# Patient Record
Sex: MALE | Race: WHITE | NOT HISPANIC OR LATINO
[De-identification: names, ages, dates, MRNs, and addresses within clinical notes are randomized per-mention and may not be internally consistent; named-entity substitution may affect disease eponyms.]

---

## 2024-01-16 PROBLEM — Z00.00 ENCOUNTER FOR PREVENTIVE HEALTH EXAMINATION: Status: ACTIVE | Noted: 2024-01-16

## 2024-01-17 ENCOUNTER — APPOINTMENT (OUTPATIENT)
Dept: COLORECTAL SURGERY | Facility: CLINIC | Age: 59
End: 2024-01-17
Payer: MEDICAID

## 2024-01-17 VITALS
SYSTOLIC BLOOD PRESSURE: 140 MMHG | BODY MASS INDEX: 23.07 KG/M2 | TEMPERATURE: 98.9 F | RESPIRATION RATE: 12 BRPM | HEIGHT: 67 IN | HEART RATE: 76 BPM | DIASTOLIC BLOOD PRESSURE: 80 MMHG | WEIGHT: 147 LBS

## 2024-01-17 VITALS — BODY MASS INDEX: 23.39 KG/M2 | HEIGHT: 67 IN | WEIGHT: 149 LBS

## 2024-01-17 DIAGNOSIS — Z78.9 OTHER SPECIFIED HEALTH STATUS: ICD-10-CM

## 2024-01-17 DIAGNOSIS — Z83.3 FAMILY HISTORY OF DIABETES MELLITUS: ICD-10-CM

## 2024-01-17 DIAGNOSIS — Z80.3 FAMILY HISTORY OF MALIGNANT NEOPLASM OF BREAST: ICD-10-CM

## 2024-01-17 DIAGNOSIS — Z82.49 FAMILY HISTORY OF ISCHEMIC HEART DISEASE AND OTHER DISEASES OF THE CIRCULATORY SYSTEM: ICD-10-CM

## 2024-01-17 PROCEDURE — 45330 DIAGNOSTIC SIGMOIDOSCOPY: CPT

## 2024-01-17 PROCEDURE — 99204 OFFICE O/P NEW MOD 45 MIN: CPT | Mod: 25

## 2024-01-21 NOTE — ASSESSMENT
[FreeTextEntry1] : Pleasant 58-year-old gentleman presents for consultation regarding management of newly diagnosed distal sigmoid mass.  Patient was experiencing some change in bowel habits and for this reason saw gastroenterology for his initial colonoscopy.  The colonoscopy was done a few days ago revealing a malignant appearing lesion of the distal sigmoid the bottom edge of which was 15 cm from the anal verge.  The colonoscope could not traverse the lesion.  Biopsies are pending.  No CAT scan has been performed.  The patient has no significant past medical history.  His only surgical history includes an inguinal hernia repair with mesh.  Physical examination reveals a very healthy-appearing male.  His abdomen is soft, nontender, nondistended.  No mass is palpable.  There is no inguinal adenopathy.  Inspection of the anorectal area is unremarkable.  On digital exam no mass is palpable.  Flexible sigmoidoscopy confirmed the presence of a malignant appearing mass in the distal sigmoid.  Rigid sigmoidoscopy confirms that the bottom edge of the lesion is 15 cm from the anal verge.  The scope cannot traverse the mass.  The patient requires a CAT scan of the chest, abdomen and pelvis as part of an extent of disease workup.  As long as he does not have significant distal metastatic disease, he will require a laparoscopic assisted anterior resection.  We discussed the operative approach, anticipated operative time, hospital stay and time to complete recuperation.  Risks, alternatives and benefits including but not limited to anastomotic leak, wound infection and deep venous thrombosis were discussed.  Final pathologic staging and the possible need for adjuvant chemotherapy was also discussed.  Questions were answered and they wish to proceed with surgical scheduling.  He requires a CAT scan of the chest,, abdomen and pelvis as part of an extent of disease work-up.

## 2024-01-21 NOTE — PHYSICAL EXAM
[Normal Breath Sounds] : Normal breath sounds [Normal Heart Sounds] : normal heart sounds [Normal Rate and Rhythm] : normal rate and rhythm [No Edema] : No edema [Alert] : alert [Oriented to Person] : oriented to person [Oriented to Place] : oriented to place [Oriented to Time] : oriented to time [Calm] : calm [de-identified] : flat +BS NT/ND [de-identified] : NC/AT [de-identified] : +ROM [de-identified] : intact

## 2024-01-21 NOTE — HISTORY OF PRESENT ILLNESS
[FreeTextEntry1] : 59yo WM with onset of a change in bowel habits (ribbon like), rectal bleeding @one month ago. Seen by GI. Jan 9, 2024 underwent initial screening colonoscopy. This revealed a near obstructing rectosigmoid mass, which was biopsied. The scope was unable to traverse the lesion. Pathology revealed adenoCA. Denies CT. Presents for consultation.  Passing stool daily. Denies abdominal pain. Wt stable. Maintaining regular diet.

## 2024-01-25 ENCOUNTER — TRANSCRIPTION ENCOUNTER (OUTPATIENT)
Age: 59
End: 2024-01-25

## 2024-01-25 ENCOUNTER — OUTPATIENT (OUTPATIENT)
Dept: OUTPATIENT SERVICES | Facility: HOSPITAL | Age: 59
LOS: 1 days | End: 2024-01-25
Payer: COMMERCIAL

## 2024-01-25 VITALS
HEIGHT: 67 IN | RESPIRATION RATE: 18 BRPM | HEART RATE: 80 BPM | WEIGHT: 143.08 LBS | SYSTOLIC BLOOD PRESSURE: 158 MMHG | TEMPERATURE: 99 F | OXYGEN SATURATION: 98 % | DIASTOLIC BLOOD PRESSURE: 89 MMHG

## 2024-01-25 DIAGNOSIS — Z98.890 OTHER SPECIFIED POSTPROCEDURAL STATES: Chronic | ICD-10-CM

## 2024-01-25 DIAGNOSIS — Z01.818 ENCOUNTER FOR OTHER PREPROCEDURAL EXAMINATION: ICD-10-CM

## 2024-01-25 DIAGNOSIS — K63.89 OTHER SPECIFIED DISEASES OF INTESTINE: ICD-10-CM

## 2024-01-25 DIAGNOSIS — Z29.9 ENCOUNTER FOR PROPHYLACTIC MEASURES, UNSPECIFIED: ICD-10-CM

## 2024-01-25 LAB
ANION GAP SERPL CALC-SCNC: 11 MMOL/L — SIGNIFICANT CHANGE UP (ref 5–17)
BLD GP AB SCN SERPL QL: NEGATIVE — SIGNIFICANT CHANGE UP
BUN SERPL-MCNC: 22 MG/DL — SIGNIFICANT CHANGE UP (ref 7–23)
CALCIUM SERPL-MCNC: 9.8 MG/DL — SIGNIFICANT CHANGE UP (ref 8.4–10.5)
CHLORIDE SERPL-SCNC: 101 MMOL/L — SIGNIFICANT CHANGE UP (ref 96–108)
CO2 SERPL-SCNC: 27 MMOL/L — SIGNIFICANT CHANGE UP (ref 22–31)
CREAT SERPL-MCNC: 1.42 MG/DL — HIGH (ref 0.5–1.3)
EGFR: 57 ML/MIN/1.73M2 — LOW
GLUCOSE SERPL-MCNC: 98 MG/DL — SIGNIFICANT CHANGE UP (ref 70–99)
HCT VFR BLD CALC: 44.7 % — SIGNIFICANT CHANGE UP (ref 39–50)
HGB BLD-MCNC: 14.3 G/DL — SIGNIFICANT CHANGE UP (ref 13–17)
MCHC RBC-ENTMCNC: 28.3 PG — SIGNIFICANT CHANGE UP (ref 27–34)
MCHC RBC-ENTMCNC: 32 GM/DL — SIGNIFICANT CHANGE UP (ref 32–36)
MCV RBC AUTO: 88.3 FL — SIGNIFICANT CHANGE UP (ref 80–100)
NRBC # BLD: 0 /100 WBCS — SIGNIFICANT CHANGE UP (ref 0–0)
PLATELET # BLD AUTO: 511 K/UL — HIGH (ref 150–400)
POTASSIUM SERPL-MCNC: 4.5 MMOL/L — SIGNIFICANT CHANGE UP (ref 3.5–5.3)
POTASSIUM SERPL-SCNC: 4.5 MMOL/L — SIGNIFICANT CHANGE UP (ref 3.5–5.3)
RBC # BLD: 5.06 M/UL — SIGNIFICANT CHANGE UP (ref 4.2–5.8)
RBC # FLD: 12.7 % — SIGNIFICANT CHANGE UP (ref 10.3–14.5)
RH IG SCN BLD-IMP: POSITIVE — SIGNIFICANT CHANGE UP
SODIUM SERPL-SCNC: 139 MMOL/L — SIGNIFICANT CHANGE UP (ref 135–145)
WBC # BLD: 8.59 K/UL — SIGNIFICANT CHANGE UP (ref 3.8–10.5)
WBC # FLD AUTO: 8.59 K/UL — SIGNIFICANT CHANGE UP (ref 3.8–10.5)

## 2024-01-25 PROCEDURE — 80048 BASIC METABOLIC PNL TOTAL CA: CPT

## 2024-01-25 PROCEDURE — 86901 BLOOD TYPING SEROLOGIC RH(D): CPT

## 2024-01-25 PROCEDURE — 82378 CARCINOEMBRYONIC ANTIGEN: CPT

## 2024-01-25 PROCEDURE — 87086 URINE CULTURE/COLONY COUNT: CPT

## 2024-01-25 PROCEDURE — G0463: CPT

## 2024-01-25 PROCEDURE — 85027 COMPLETE CBC AUTOMATED: CPT

## 2024-01-25 PROCEDURE — 86900 BLOOD TYPING SEROLOGIC ABO: CPT

## 2024-01-25 PROCEDURE — 86850 RBC ANTIBODY SCREEN: CPT

## 2024-01-25 PROCEDURE — 83036 HEMOGLOBIN GLYCOSYLATED A1C: CPT

## 2024-01-25 NOTE — H&P PST ADULT - HISTORY OF PRESENT ILLNESS
57 yo male presents to [facility] prior to scheduled Laparoscopic lower anterior resection on 1/30/24 with Dr. Aquilino Alegre. Pt c/o recent changes in bowel habits (ribbon like stools); had colonoscopy which revealed large mass.     Patient was recently evaluated by Dr. Alegre and above surgery was recommended.     57 yo male presents to [facility] prior to scheduled Laparoscopic lower anterior resection on 1/30/24 with Dr. Aquilino Alegre. Pt c/o recent changes in bowel habits (ribbon like stools); had colonoscopy which revealed large mass, path revealed adenocarcinoma. Denies past medical hx, not on any medications.    Patient was recently evaluated by Dr. Alegre and above surgery was recommended.

## 2024-01-25 NOTE — H&P PST ADULT - ASSESSMENT
DASI score: 8  DASI activity: runs 2 miles / day 4x a week - works out at the gym 4x a day - weight training   Loose teeth or denture: denies    CAPRINI SCORE [CLOT]    AGE RELATED RISK FACTORS                                                       MOBILITY RELATED FACTORS  [ x] Age 41-60 years                                            (1 Point)                  [ ] Bed rest                                                        (1 Point)  [ ] Age: 61-74 years                                           (2 Points)                 [ ] Plaster cast                                                   (2 Points)  [ ] Age= 75 years                                              (3 Points)                 [ ] Bed bound for more than 72 hours                 (2 Points)    DISEASE RELATED RISK FACTORS                                               GENDER SPECIFIC FACTORS  [ ] Edema in the lower extremities                       (1 Point)                  [ ] Pregnancy                                                     (1 Point)  [ ] Varicose veins                                               (1 Point)                  [ ] Post-partum < 6 weeks                                   (1 Point)             [x ] BMI > 25 Kg/m2                                            (1 Point)                  [ ] Hormonal therapy  or oral contraception          (1 Point)                 [ ] Sepsis (in the previous month)                        (1 Point)                  [ ] History of pregnancy complications                 (1 point)  [ ] Pneumonia or serious lung disease                                               [ ] Unexplained or recurrent                     (1 Point)           (in the previous month)                               (1 Point)  [ ] Abnormal pulmonary function test                     (1 Point)                 SURGERY RELATED RISK FACTORS  [ ] Acute myocardial infarction                              (1 Point)                 [ ]  Section                                             (1 Point)  [ ] Congestive heart failure (in the previous month)  (1 Point)               [ ] Minor surgery                                                  (1 Point)   [ ] Inflammatory bowel disease                             (1 Point)                 [ ] Arthroscopic surgery                                        (2 Points)  [ ] Central venous access                                      (2 Points)                [x ] General surgery lasting more than 45 minutes   (2 Points)       [ ] Stroke (in the previous month)                          (5 Points)               [ ] Elective arthroplasty                                         (5 Points)                                                                                                                                               HEMATOLOGY RELATED FACTORS                                                 TRAUMA RELATED RISK FACTORS  [ ] Prior episodes of VTE                                     (3 Points)                [ ] Fracture of the hip, pelvis, or leg                       (5 Points)  [ ] Positive family history for VTE                         (3 Points)                 [ ] Acute spinal cord injury (in the previous month)  (5 Points)  [ ] Prothrombin 05853 A                                     (3 Points)                 [ ] Paralysis  (less than 1 month)                             (5 Points)  [ ] Factor V Leiden                                             (3 Points)                  [ ] Multiple Trauma within 1 month                        (5 Points)  [ ] Lupus anticoagulants                                     (3 Points)                                                           [ ] Anticardiolipin antibodies                               (3 Points)                                                       [ ] High homocysteine in the blood                      (3 Points)                                             [ ] Other congenital or acquired thrombophilia      (3 Points)                                                [ ] Heparin induced thrombocytopenia                  (3 Points)                                          Total Score [        4  ]    Caprini Score 0 - 2:  Low Risk, No VTE Prophylaxis required for most patients, encourage ambulation  Caprini Score 3 - 6:  At Risk, pharmacologic VTE prophylaxis is indicated for most patients (in the absence of a contraindication)  Caprini Score Greater than or = 7:  High Risk, pharmacologic VTE prophylaxis is indicated for most patients (in the absence of a contraindication) DASI score: 8  DASI activity: runs 2 miles / day 4x a week - works out at the gym 4x a day - weight training   Loose teeth or denture: denies    CAPRINI SCORE [CLOT]    AGE RELATED RISK FACTORS                                                       MOBILITY RELATED FACTORS  [ x] Age 41-60 years                                            (1 Point)                  [ ] Bed rest                                                        (1 Point)  [ ] Age: 61-74 years                                           (2 Points)                 [ ] Plaster cast                                                   (2 Points)  [ ] Age= 75 years                                              (3 Points)                 [ ] Bed bound for more than 72 hours                 (2 Points)    DISEASE RELATED RISK FACTORS                                               GENDER SPECIFIC FACTORS  [ ] Edema in the lower extremities                       (1 Point)                  [ ] Pregnancy                                                     (1 Point)  [ ] Varicose veins                                               (1 Point)                  [ ] Post-partum < 6 weeks                                   (1 Point)             [ ] BMI > 25 Kg/m2                                            (1 Point)                  [ ] Hormonal therapy  or oral contraception          (1 Point)                 [ ] Sepsis (in the previous month)                        (1 Point)                  [ ] History of pregnancy complications                 (1 point)  [ ] Pneumonia or serious lung disease                                               [ ] Unexplained or recurrent                     (1 Point)           (in the previous month)                               (1 Point)  [ ] Abnormal pulmonary function test                     (1 Point)                 SURGERY RELATED RISK FACTORS  [ ] Acute myocardial infarction                              (1 Point)                 [ ]  Section                                             (1 Point)  [ ] Congestive heart failure (in the previous month)  (1 Point)               [ ] Minor surgery                                                  (1 Point)   [ ] Inflammatory bowel disease                             (1 Point)                 [ ] Arthroscopic surgery                                        (2 Points)  [ ] Central venous access                                      (2 Points)                [x ] General surgery lasting more than 45 minutes   (2 Points)       [ ] Stroke (in the previous month)                          (5 Points)               [ ] Elective arthroplasty                                         (5 Points)                                                                                                                                               HEMATOLOGY RELATED FACTORS                                                 TRAUMA RELATED RISK FACTORS  [ ] Prior episodes of VTE                                     (3 Points)                [ ] Fracture of the hip, pelvis, or leg                       (5 Points)  [ ] Positive family history for VTE                         (3 Points)                 [ ] Acute spinal cord injury (in the previous month)  (5 Points)  [ ] Prothrombin 69348 A                                     (3 Points)                 [ ] Paralysis  (less than 1 month)                             (5 Points)  [ ] Factor V Leiden                                             (3 Points)                  [ ] Multiple Trauma within 1 month                        (5 Points)  [ ] Lupus anticoagulants                                     (3 Points)                                                           [ ] Anticardiolipin antibodies                               (3 Points)                                                       [ ] High homocysteine in the blood                      (3 Points)                                             [ ] Other congenital or acquired thrombophilia      (3 Points)                                                [ ] Heparin induced thrombocytopenia                  (3 Points)                                          Total Score [       3  ]    Caprini Score 0 - 2:  Low Risk, No VTE Prophylaxis required for most patients, encourage ambulation  Caprini Score 3 - 6:  At Risk, pharmacologic VTE prophylaxis is indicated for most patients (in the absence of a contraindication)  Caprini Score Greater than or = 7:  High Risk, pharmacologic VTE prophylaxis is indicated for most patients (in the absence of a contraindication)

## 2024-01-25 NOTE — H&P PST ADULT - PROBLEM SELECTOR PLAN 1
Laparoscopic lower anterior resection on 1/30/24 with Dr. Aquilino Alegre.  Pre-op instructions given. Questions answered.

## 2024-01-25 NOTE — H&P PST ADULT - NSICDXFAMILYHX_GEN_ALL_CORE_FT
FAMILY HISTORY:  Father  Still living? Unknown  FH: HTN (hypertension), Age at diagnosis: Age Unknown  FH: type 2 diabetes, Age at diagnosis: Age Unknown    Mother  Still living? Unknown  FH: breast cancer, Age at diagnosis: Age Unknown    Sibling  Still living? Unknown  FHx: congenital heart disease, Age at diagnosis: Age Unknown

## 2024-01-25 NOTE — H&P PST ADULT - NSICDXPASTMEDICALHX_GEN_ALL_CORE_FT
PAST MEDICAL HISTORY:  Colon cancer      PAST MEDICAL HISTORY:  Colon cancer     H/O inguinal hernia

## 2024-01-26 ENCOUNTER — RESULT REVIEW (OUTPATIENT)
Age: 59
End: 2024-01-26

## 2024-01-26 LAB
A1C WITH ESTIMATED AVERAGE GLUCOSE RESULT: 5.5 % — SIGNIFICANT CHANGE UP (ref 4–5.6)
CEA SERPL-MCNC: 1.5 NG/ML — SIGNIFICANT CHANGE UP (ref 0–3.8)
CULTURE RESULTS: SIGNIFICANT CHANGE UP
ESTIMATED AVERAGE GLUCOSE: 111 MG/DL — SIGNIFICANT CHANGE UP (ref 68–114)
SPECIMEN SOURCE: SIGNIFICANT CHANGE UP

## 2024-01-29 ENCOUNTER — TRANSCRIPTION ENCOUNTER (OUTPATIENT)
Age: 59
End: 2024-01-29

## 2024-01-29 PROBLEM — C18.9 MALIGNANT NEOPLASM OF COLON, UNSPECIFIED: Chronic | Status: ACTIVE | Noted: 2024-01-25

## 2024-01-29 PROBLEM — Z87.19 PERSONAL HISTORY OF OTHER DISEASES OF THE DIGESTIVE SYSTEM: Chronic | Status: ACTIVE | Noted: 2024-01-25

## 2024-01-30 ENCOUNTER — APPOINTMENT (OUTPATIENT)
Dept: COLORECTAL SURGERY | Facility: HOSPITAL | Age: 59
End: 2024-01-30
Payer: MEDICAID

## 2024-01-30 ENCOUNTER — APPOINTMENT (OUTPATIENT)
Dept: UROLOGY | Facility: HOSPITAL | Age: 59
End: 2024-01-30

## 2024-01-30 ENCOUNTER — INPATIENT (INPATIENT)
Facility: HOSPITAL | Age: 59
LOS: 5 days | Discharge: HOME | DRG: 330 | End: 2024-02-05
Attending: SURGERY | Admitting: SURGERY
Payer: COMMERCIAL

## 2024-01-30 ENCOUNTER — RESULT REVIEW (OUTPATIENT)
Age: 59
End: 2024-01-30

## 2024-01-30 VITALS
OXYGEN SATURATION: 100 % | RESPIRATION RATE: 17 BRPM | WEIGHT: 143.08 LBS | DIASTOLIC BLOOD PRESSURE: 82 MMHG | TEMPERATURE: 98 F | HEIGHT: 67.01 IN | SYSTOLIC BLOOD PRESSURE: 176 MMHG | HEART RATE: 79 BPM

## 2024-01-30 DIAGNOSIS — Z98.890 OTHER SPECIFIED POSTPROCEDURAL STATES: Chronic | ICD-10-CM

## 2024-01-30 DIAGNOSIS — K63.89 OTHER SPECIFIED DISEASES OF INTESTINE: ICD-10-CM

## 2024-01-30 LAB
GLUCOSE BLDC GLUCOMTR-MCNC: 124 MG/DL — HIGH (ref 70–99)
RH IG SCN BLD-IMP: POSITIVE — SIGNIFICANT CHANGE UP

## 2024-01-30 PROCEDURE — 88342 IMHCHEM/IMCYTCHM 1ST ANTB: CPT | Mod: 26

## 2024-01-30 PROCEDURE — 52344 CYSTO/URETERO STRICTURE TX: CPT | Mod: RT,59

## 2024-01-30 PROCEDURE — 74420 UROGRAPHY RTRGR +-KUB: CPT | Mod: 26

## 2024-01-30 PROCEDURE — 88305 TISSUE EXAM BY PATHOLOGIST: CPT | Mod: 26

## 2024-01-30 PROCEDURE — 88341 IMHCHEM/IMCYTCHM EA ADD ANTB: CPT | Mod: 26

## 2024-01-30 PROCEDURE — 52240 CYSTOSCOPY AND TREATMENT: CPT

## 2024-01-30 PROCEDURE — 45300 PROCTOSIGMOIDOSCOPY DX: CPT

## 2024-01-30 PROCEDURE — 52332 CYSTOSCOPY AND TREATMENT: CPT | Mod: 50,59

## 2024-01-30 PROCEDURE — 44207 L COLECTOMY/COLOPROCTOSTOMY: CPT

## 2024-01-30 PROCEDURE — 88309 TISSUE EXAM BY PATHOLOGIST: CPT | Mod: 26

## 2024-01-30 DEVICE — VISTASEAL FIBRIN HUMAN 4ML: Type: IMPLANTABLE DEVICE | Status: FUNCTIONAL

## 2024-01-30 DEVICE — IMPLANTABLE DEVICE: Type: IMPLANTABLE DEVICE | Status: FUNCTIONAL

## 2024-01-30 DEVICE — KIT URET PERFLX PLUS 7FRX26CM: Type: IMPLANTABLE DEVICE | Status: FUNCTIONAL

## 2024-01-30 DEVICE — GUIDEWIRE AMPLATZ SUPER-STIFF STRAIGHT .035" X 145CM: Type: IMPLANTABLE DEVICE | Status: FUNCTIONAL

## 2024-01-30 DEVICE — GUIDEWIRE SENSOR DUAL-FLEX NITINOL STRAIGHT .035" X 150CM: Type: IMPLANTABLE DEVICE | Status: FUNCTIONAL

## 2024-01-30 DEVICE — URETERAL SHEATH NAVIGATOR HD 12/14FR X 36CM: Type: IMPLANTABLE DEVICE | Status: FUNCTIONAL

## 2024-01-30 DEVICE — URETERAL STENT INLAY OPTIMA 8FR 26CM: Type: IMPLANTABLE DEVICE | Status: FUNCTIONAL

## 2024-01-30 DEVICE — STAPLER COVIDIEN PURSTRING 65MM: Type: IMPLANTABLE DEVICE | Status: FUNCTIONAL

## 2024-01-30 DEVICE — BALLOON CATH UROMAX ULTRA 18FR X 4CM: Type: IMPLANTABLE DEVICE | Status: FUNCTIONAL

## 2024-01-30 DEVICE — STAPLER ETHICON CIRCULAR XL 29MM: Type: IMPLANTABLE DEVICE | Status: FUNCTIONAL

## 2024-01-30 DEVICE — URETERAL STENT CONTOUR 6FR 26CM: Type: IMPLANTABLE DEVICE | Status: FUNCTIONAL

## 2024-01-30 DEVICE — STAPLER CONTOUR CURVED WITH BLUE CART: Type: IMPLANTABLE DEVICE | Status: FUNCTIONAL

## 2024-01-30 DEVICE — URETERAL CATH OPEN END 5FR 70CM: Type: IMPLANTABLE DEVICE | Status: FUNCTIONAL

## 2024-01-30 DEVICE — DILATOR SHEATH SET 8/10: Type: IMPLANTABLE DEVICE | Status: FUNCTIONAL

## 2024-01-30 DEVICE — URETERAL CATH WHISTLE TIP 5FR LEFT: Type: IMPLANTABLE DEVICE | Status: FUNCTIONAL

## 2024-01-30 DEVICE — STENT URET 7FR 26CM: Type: IMPLANTABLE DEVICE | Status: FUNCTIONAL

## 2024-01-30 RX ORDER — DEXAMETHASONE 0.5 MG/5ML
4 ELIXIR ORAL EVERY 6 HOURS
Refills: 0 | Status: DISCONTINUED | OUTPATIENT
Start: 2024-01-30 | End: 2024-01-31

## 2024-01-30 RX ORDER — OXYCODONE HYDROCHLORIDE 5 MG/1
5 TABLET ORAL
Refills: 0 | Status: DISCONTINUED | OUTPATIENT
Start: 2024-01-30 | End: 2024-01-31

## 2024-01-30 RX ORDER — CHLORHEXIDINE GLUCONATE 213 G/1000ML
1 SOLUTION TOPICAL ONCE
Refills: 0 | Status: DISCONTINUED | OUTPATIENT
Start: 2024-01-30 | End: 2024-01-30

## 2024-01-30 RX ORDER — ONDANSETRON 8 MG/1
4 TABLET, FILM COATED ORAL EVERY 6 HOURS
Refills: 0 | Status: DISCONTINUED | OUTPATIENT
Start: 2024-01-30 | End: 2024-01-31

## 2024-01-30 RX ORDER — INFLUENZA VIRUS VACCINE 15; 15; 15; 15 UG/.5ML; UG/.5ML; UG/.5ML; UG/.5ML
0.5 SUSPENSION INTRAMUSCULAR ONCE
Refills: 0 | Status: DISCONTINUED | OUTPATIENT
Start: 2024-01-30 | End: 2024-02-05

## 2024-01-30 RX ORDER — SODIUM CHLORIDE 9 MG/ML
3 INJECTION INTRAMUSCULAR; INTRAVENOUS; SUBCUTANEOUS EVERY 8 HOURS
Refills: 0 | Status: DISCONTINUED | OUTPATIENT
Start: 2024-01-30 | End: 2024-01-30

## 2024-01-30 RX ORDER — MORPHINE SULFATE 50 MG/1
0.25 CAPSULE, EXTENDED RELEASE ORAL ONCE
Refills: 0 | Status: DISCONTINUED | OUTPATIENT
Start: 2024-01-30 | End: 2024-01-31

## 2024-01-30 RX ORDER — OXYCODONE HYDROCHLORIDE 5 MG/1
10 TABLET ORAL
Refills: 0 | Status: DISCONTINUED | OUTPATIENT
Start: 2024-01-30 | End: 2024-01-31

## 2024-01-30 RX ORDER — ACETAMINOPHEN 500 MG
1000 TABLET ORAL EVERY 6 HOURS
Refills: 0 | Status: COMPLETED | OUTPATIENT
Start: 2024-01-30 | End: 2024-01-31

## 2024-01-30 RX ORDER — SODIUM CHLORIDE 9 MG/ML
1000 INJECTION, SOLUTION INTRAVENOUS
Refills: 0 | Status: DISCONTINUED | OUTPATIENT
Start: 2024-01-30 | End: 2024-01-30

## 2024-01-30 RX ORDER — CELECOXIB 200 MG/1
400 CAPSULE ORAL ONCE
Refills: 0 | Status: COMPLETED | OUTPATIENT
Start: 2024-01-30 | End: 2024-01-30

## 2024-01-30 RX ORDER — OXYCODONE HYDROCHLORIDE 5 MG/1
2.5 TABLET ORAL EVERY 4 HOURS
Refills: 0 | Status: DISCONTINUED | OUTPATIENT
Start: 2024-01-30 | End: 2024-02-05

## 2024-01-30 RX ORDER — SODIUM CHLORIDE 9 MG/ML
1000 INJECTION, SOLUTION INTRAVENOUS
Refills: 0 | Status: DISCONTINUED | OUTPATIENT
Start: 2024-01-30 | End: 2024-01-31

## 2024-01-30 RX ORDER — OXYCODONE HYDROCHLORIDE 5 MG/1
5 TABLET ORAL EVERY 4 HOURS
Refills: 0 | Status: DISCONTINUED | OUTPATIENT
Start: 2024-01-30 | End: 2024-02-05

## 2024-01-30 RX ORDER — HEPARIN SODIUM 5000 [USP'U]/ML
5000 INJECTION INTRAVENOUS; SUBCUTANEOUS EVERY 8 HOURS
Refills: 0 | Status: DISCONTINUED | OUTPATIENT
Start: 2024-01-30 | End: 2024-02-05

## 2024-01-30 RX ORDER — LIDOCAINE HCL 20 MG/ML
0.2 VIAL (ML) INJECTION ONCE
Refills: 0 | Status: DISCONTINUED | OUTPATIENT
Start: 2024-01-30 | End: 2024-01-30

## 2024-01-30 RX ORDER — CEFOTETAN DISODIUM 1 G
2 VIAL (EA) INJECTION ONCE
Refills: 0 | Status: COMPLETED | OUTPATIENT
Start: 2024-01-30 | End: 2024-01-30

## 2024-01-30 RX ORDER — NALOXONE HYDROCHLORIDE 4 MG/.1ML
0.1 SPRAY NASAL
Refills: 0 | Status: DISCONTINUED | OUTPATIENT
Start: 2024-01-30 | End: 2024-01-31

## 2024-01-30 RX ADMIN — HEPARIN SODIUM 5000 UNIT(S): 5000 INJECTION INTRAVENOUS; SUBCUTANEOUS at 21:50

## 2024-01-30 RX ADMIN — SODIUM CHLORIDE 40 MILLILITER(S): 9 INJECTION, SOLUTION INTRAVENOUS at 23:01

## 2024-01-30 RX ADMIN — Medication 400 MILLIGRAM(S): at 23:01

## 2024-01-30 RX ADMIN — Medication 1000 MILLIGRAM(S): at 23:31

## 2024-01-30 NOTE — CHART NOTE - NSCHARTNOTEFT_GEN_A_CORE
General Surgery Post op Check    Pt seen and examined without complaints. Pain is well-controlled. Denies SOB/CP/N/V. Passing gas.     Vital Signs Last 24 Hrs  T(C): 37.2 (30 Jan 2024 23:00), Max: 37.2 (30 Jan 2024 23:00)  T(F): 99 (30 Jan 2024 23:00), Max: 99 (30 Jan 2024 23:00)  HR: 77 (30 Jan 2024 23:00) (76 - 88)  BP: 121/79 (30 Jan 2024 23:00) (121/79 - 176/82)  BP(mean): 88 (30 Jan 2024 21:30) (88 - 107)  RR: 18 (30 Jan 2024 23:00) (13 - 18)  SpO2: 100% (30 Jan 2024 23:00) (96% - 100%)    Parameters below as of 30 Jan 2024 23:00  Patient On (Oxygen Delivery Method): nasal cannula  O2 Flow (L/min): 2      I&O's Summary    30 Jan 2024 07:01  -  30 Jan 2024 23:39  --------------------------------------------------------  IN: 230 mL / OUT: 200 mL / NET: 30 mL        Physical Exam  Gen: NAD, A&Ox3  Pulm: No respiratory distress, no subcostal retractions  CV: RRR, no JVD  Abd: Soft, NT, ND, dressings c/d/i  Extremities:  FROM, warm and well perfused, equal bilateral muscle strength  : morales, dressing in place      A/P: 58y Male with rectosigmoid cancer s/p laparoscopic hand assisted anterior resection, transurethral section of bladder tumor by urology, doing well.   -Do not remove or manipulate morales - management per urology  -ERP  -No toradol  -CLD + IVF  -DVT ppx: [facility]    [facility]  [id number]

## 2024-01-30 NOTE — BRIEF OPERATIVE NOTE - NSICDXBRIEFPROCEDURE_GEN_ALL_CORE_FT
PROCEDURES:  Cystourethroscopy with bladder tumor resection, large 30-Jan-2024 17:48:24  Alfred Anderson

## 2024-01-30 NOTE — BRIEF OPERATIVE NOTE - NSICDXBRIEFPROCEDURE_GEN_ALL_CORE_FT
PROCEDURES:  Resection, colon, anterior 25-Jan-2024 17:58:08  Caroline Jackson  Cystourethroscopy with bladder tumor resection, large 30-Jan-2024 17:48:24  Alfred Anderson  Intraoperative rigid sigmoidoscopy 30-Jan-2024 19:40:39  Bernie Keen

## 2024-01-30 NOTE — BRIEF OPERATIVE NOTE - OPERATION/FINDINGS
Cystoscopy, TURBT, right ureteral stent placement, left ureteral catheter placement. 22Fr 2 way in place.
Tumor identified. Laparoscopic hand assisted anterior resection performed. Colorectal anastomosis created with EEA 29 stapler at 11cm. Negative air leak test. Anastomosis patent and hemostatic on rigid sigmoidoscopy. Bilateral ureters identified and protected.

## 2024-01-30 NOTE — PROGRESS NOTE ADULT - SUBJECTIVE AND OBJECTIVE BOX
Post op Check    Pt seen and examined without complaints. Pain is controlled. Denies SOB/CP/N/V.     Vital Signs Last 24 Hrs  T(C): 36.7 (30 Jan 2024 21:00), Max: 36.7 (30 Jan 2024 19:50)  T(F): 98 (30 Jan 2024 21:00), Max: 98 (30 Jan 2024 19:50)  HR: 79 (30 Jan 2024 21:30) (76 - 88)  BP: 124/73 (30 Jan 2024 21:30) (124/73 - 176/82)  BP(mean): 88 (30 Jan 2024 21:30) (88 - 107)  RR: 16 (30 Jan 2024 21:30) (13 - 17)  SpO2: 96% (30 Jan 2024 21:30) (96% - 100%)    Parameters below as of 30 Jan 2024 21:30  Patient On (Oxygen Delivery Method): nasal cannula  O2 Flow (L/min): 2      I&O's Summary    30 Jan 2024 07:01  -  30 Jan 2024 22:00  --------------------------------------------------------  IN: 230 mL / OUT: 200 mL / NET: 30 mL        Physical Exam  Gen: NAD  Pulm: No respiratory distress, no subcostal retractions  Abd: Soft  : ucath secured in adaptor. morales in place with betito colored urine draining.                 A/P: 58y Male s/p Cystoscopy, TURBT, right ureteral stent placement, left ureteral catheter placement prior to laparoscopic hand assisted anterior resection    maintain morales catheter x 2 weeks  keep L ucath until Thursday   to follow  remainder of care per primary team

## 2024-01-30 NOTE — PATIENT PROFILE ADULT - FALL HARM RISK - UNIVERSAL INTERVENTIONS
Bed in lowest position, wheels locked, appropriate side rails in place/Call bell, personal items and telephone in reach/Instruct patient to call for assistance before getting out of bed or chair/Non-slip footwear when patient is out of bed/[place] to call system/Physically safe environment - no spills, clutter or unnecessary equipment/Purposeful Proactive Rounding/Room/bathroom lighting operational, light cord in reach

## 2024-01-30 NOTE — CHART NOTE - NSCHARTNOTEFT_GEN_A_CORE
Patient is s/p TURBT, right ureteral stent placement, and left ureteral catheter placement with Dr. Malcolm  - Bladder tumor was large and over the right ureteral orifice  - Plan to keep 22Fr 2 way morales in for 2 weeks. 15cc is in the balloon  - Right ureteral stent in place with no string -- patient will f/u outpatient after discharge with Dr. Malcolm for stent removal  - Keep Left ureteral catheter for two days and can be removed on 2/1  - Urology will follow  - Discussed with Dr. Malcolm

## 2024-01-31 ENCOUNTER — TRANSCRIPTION ENCOUNTER (OUTPATIENT)
Age: 59
End: 2024-01-31

## 2024-01-31 LAB
ANION GAP SERPL CALC-SCNC: 8 MMOL/L — SIGNIFICANT CHANGE UP (ref 5–17)
BUN SERPL-MCNC: 12 MG/DL — SIGNIFICANT CHANGE UP (ref 7–23)
CALCIUM SERPL-MCNC: 8.4 MG/DL — SIGNIFICANT CHANGE UP (ref 8.4–10.5)
CHLORIDE SERPL-SCNC: 104 MMOL/L — SIGNIFICANT CHANGE UP (ref 96–108)
CO2 SERPL-SCNC: 27 MMOL/L — SIGNIFICANT CHANGE UP (ref 22–31)
CREAT SERPL-MCNC: 1.26 MG/DL — SIGNIFICANT CHANGE UP (ref 0.5–1.3)
EGFR: 66 ML/MIN/1.73M2 — SIGNIFICANT CHANGE UP
GLUCOSE SERPL-MCNC: 97 MG/DL — SIGNIFICANT CHANGE UP (ref 70–99)
HCT VFR BLD CALC: 38.3 % — LOW (ref 39–50)
HGB BLD-MCNC: 12 G/DL — LOW (ref 13–17)
MAGNESIUM SERPL-MCNC: 2.2 MG/DL — SIGNIFICANT CHANGE UP (ref 1.6–2.6)
MCHC RBC-ENTMCNC: 27.6 PG — SIGNIFICANT CHANGE UP (ref 27–34)
MCHC RBC-ENTMCNC: 31.3 GM/DL — LOW (ref 32–36)
MCV RBC AUTO: 88.2 FL — SIGNIFICANT CHANGE UP (ref 80–100)
NRBC # BLD: 0 /100 WBCS — SIGNIFICANT CHANGE UP (ref 0–0)
PHOSPHATE SERPL-MCNC: 4 MG/DL — SIGNIFICANT CHANGE UP (ref 2.5–4.5)
PLATELET # BLD AUTO: 365 K/UL — SIGNIFICANT CHANGE UP (ref 150–400)
POTASSIUM SERPL-MCNC: 5 MMOL/L — SIGNIFICANT CHANGE UP (ref 3.5–5.3)
POTASSIUM SERPL-SCNC: 5 MMOL/L — SIGNIFICANT CHANGE UP (ref 3.5–5.3)
RBC # BLD: 4.34 M/UL — SIGNIFICANT CHANGE UP (ref 4.2–5.8)
RBC # FLD: 12.8 % — SIGNIFICANT CHANGE UP (ref 10.3–14.5)
SODIUM SERPL-SCNC: 139 MMOL/L — SIGNIFICANT CHANGE UP (ref 135–145)
WBC # BLD: 12.15 K/UL — HIGH (ref 3.8–10.5)
WBC # FLD AUTO: 12.15 K/UL — HIGH (ref 3.8–10.5)

## 2024-01-31 PROCEDURE — 99232 SBSQ HOSP IP/OBS MODERATE 35: CPT | Mod: 24,25

## 2024-01-31 RX ORDER — ACETAMINOPHEN 500 MG
1000 TABLET ORAL EVERY 6 HOURS
Refills: 0 | Status: DISCONTINUED | OUTPATIENT
Start: 2024-02-01 | End: 2024-02-01

## 2024-01-31 RX ADMIN — Medication 1000 MILLIGRAM(S): at 05:43

## 2024-01-31 RX ADMIN — OXYCODONE HYDROCHLORIDE 5 MILLIGRAM(S): 5 TABLET ORAL at 09:56

## 2024-01-31 RX ADMIN — OXYCODONE HYDROCHLORIDE 5 MILLIGRAM(S): 5 TABLET ORAL at 14:37

## 2024-01-31 RX ADMIN — OXYCODONE HYDROCHLORIDE 5 MILLIGRAM(S): 5 TABLET ORAL at 10:26

## 2024-01-31 RX ADMIN — HEPARIN SODIUM 5000 UNIT(S): 5000 INJECTION INTRAVENOUS; SUBCUTANEOUS at 14:08

## 2024-01-31 RX ADMIN — OXYCODONE HYDROCHLORIDE 5 MILLIGRAM(S): 5 TABLET ORAL at 23:13

## 2024-01-31 RX ADMIN — Medication 1000 MILLIGRAM(S): at 13:33

## 2024-01-31 RX ADMIN — OXYCODONE HYDROCHLORIDE 5 MILLIGRAM(S): 5 TABLET ORAL at 18:39

## 2024-01-31 RX ADMIN — HEPARIN SODIUM 5000 UNIT(S): 5000 INJECTION INTRAVENOUS; SUBCUTANEOUS at 23:03

## 2024-01-31 RX ADMIN — Medication 400 MILLIGRAM(S): at 12:33

## 2024-01-31 RX ADMIN — Medication 1000 MILLIGRAM(S): at 23:03

## 2024-01-31 RX ADMIN — OXYCODONE HYDROCHLORIDE 5 MILLIGRAM(S): 5 TABLET ORAL at 14:07

## 2024-01-31 RX ADMIN — Medication 1000 MILLIGRAM(S): at 18:39

## 2024-01-31 RX ADMIN — SODIUM CHLORIDE 40 MILLILITER(S): 9 INJECTION, SOLUTION INTRAVENOUS at 05:13

## 2024-01-31 RX ADMIN — Medication 400 MILLIGRAM(S): at 05:13

## 2024-01-31 RX ADMIN — OXYCODONE HYDROCHLORIDE 5 MILLIGRAM(S): 5 TABLET ORAL at 18:09

## 2024-01-31 RX ADMIN — HEPARIN SODIUM 5000 UNIT(S): 5000 INJECTION INTRAVENOUS; SUBCUTANEOUS at 05:14

## 2024-01-31 RX ADMIN — Medication 400 MILLIGRAM(S): at 18:09

## 2024-01-31 NOTE — DISCHARGE NOTE PROVIDER - NSDCCPTREATMENT_GEN_ALL_CORE_FT
PRINCIPAL PROCEDURE  Procedure: Resection, colon, anterior  Findings and Treatment:       SECONDARY PROCEDURE  Procedure: Cystourethroscopy with bladder tumor resection, large  Findings and Treatment:

## 2024-01-31 NOTE — DIETITIAN INITIAL EVALUATION ADULT - PERTINENT MEDS FT
MEDICATIONS  (STANDING):  acetaminophen   IVPB .. 1000 milliGRAM(s) IV Intermittent every 6 hours  heparin   Injectable 5000 Unit(s) SubCutaneous every 8 hours  influenza   Vaccine 0.5 milliLiter(s) IntraMuscular once    MEDICATIONS  (PRN):  oxyCODONE    IR 2.5 milliGRAM(s) Oral every 4 hours PRN Moderate Pain (4 - 6)  oxyCODONE    IR 5 milliGRAM(s) Oral every 4 hours PRN Severe Pain (7 - 10)

## 2024-01-31 NOTE — DISCHARGE NOTE PROVIDER - NSDCMRMEDTOKEN_GEN_ALL_CORE_FT
acetaminophen 325 mg oral tablet: 3 tab(s) orally every 6 hours  oxyCODONE 5 mg oral tablet: 1 tab(s) orally every 6 hours as needed for Severe Pain (7 - 10) MDD: 4

## 2024-01-31 NOTE — PROGRESS NOTE ADULT - SUBJECTIVE AND OBJECTIVE BOX
Day 1 of Anesthesia Pain Management Service    SUBJECTIVE: Doing ok  Pain Scale Score:          [X] Refer to charted pain scores    THERAPY:    s/p     250mcg PF morphine on 1\30\2024      MEDICATIONS  (STANDING):  acetaminophen   IVPB .. 1000 milliGRAM(s) IV Intermittent every 6 hours  heparin   Injectable 5000 Unit(s) SubCutaneous every 8 hours  influenza   Vaccine 0.5 milliLiter(s) IntraMuscular once  lactated ringers. 1000 milliLiter(s) (40 mL/Hr) IV Continuous <Continuous>    MEDICATIONS  (PRN):  oxyCODONE    IR 5 milliGRAM(s) Oral every 4 hours PRN Severe Pain (7 - 10)  oxyCODONE    IR 2.5 milliGRAM(s) Oral every 4 hours PRN Moderate Pain (4 - 6)      OBJECTIVE:    Sedation:        	[X] Alert	 [ ] Drowsy	[ ] Arousable      [ ] Asleep       [ ] Unresponsive    Side Effects:	[X] None 	[ ] Nausea	[ ] Vomiting         [ ] Pruritus  		[ ] Weakness            [ ] Numbness	          [ ] Other:    Vital Signs Last 24 Hrs  T(C): 36.7 (31 Jan 2024 09:13), Max: 37.3 (31 Jan 2024 01:00)  T(F): 98 (31 Jan 2024 09:13), Max: 99.2 (31 Jan 2024 01:00)  HR: 62 (31 Jan 2024 09:13) (55 - 88)  BP: 119/69 (31 Jan 2024 09:13) (108/60 - 176/82)  BP(mean): 88 (30 Jan 2024 21:30) (88 - 107)  RR: 18 (31 Jan 2024 09:13) (13 - 18)  SpO2: 98% (31 Jan 2024 09:13) (96% - 100%)    Parameters below as of 31 Jan 2024 09:13  Patient On (Oxygen Delivery Method): room air        ASSESSMENT/ PLAN  [X] Patient to be transitioned to prn analgesics after 24 hours  [X] Pain management per primary service, pain service to sign off   [X]Documentation and Verification of current medications

## 2024-01-31 NOTE — PROGRESS NOTE ADULT - SUBJECTIVE AND OBJECTIVE BOX
SURGERY DAILY PROGRESS NOTE    SUBJECTIVE: Patient seen and evaluated on AM rounds. Doing well. No N/V. Tolerating diet. Pain controlled.     Overnight Events:  No acute events overnight  -----------------------------------------------------------------------------------------------------------------------------------------------------------------------------------------------------------  OBJECTIVE:  Vital Signs Last 24 Hrs  T(C): 36.9 (31 Jan 2024 05:03), Max: 37.3 (31 Jan 2024 01:00)  T(F): 98.5 (31 Jan 2024 05:03), Max: 99.2 (31 Jan 2024 01:00)  HR: 55 (31 Jan 2024 05:03) (55 - 88)  BP: 112/60 (31 Jan 2024 05:03) (108/60 - 176/82)  BP(mean): 88 (30 Jan 2024 21:30) (88 - 107)  RR: 18 (31 Jan 2024 05:03) (13 - 18)  SpO2: 99% (31 Jan 2024 05:03) (96% - 100%)    Parameters below as of 31 Jan 2024 05:03  Patient On (Oxygen Delivery Method): nasal cannula  O2 Flow (L/min): 2    I&O's Detail    30 Jan 2024 07:01  -  31 Jan 2024 07:00  --------------------------------------------------------  IN:    Lactated Ringers: 320 mL    Oral Fluid: 150 mL  Total IN: 470 mL    OUT:    Indwelling Catheter - Urethral (mL): 650 mL  Total OUT: 650 mL    Total NET: -180 mL        Daily Height in cm: 170.2 (30 Jan 2024 12:30)    Daily   MEDICATIONS  (STANDING):  acetaminophen   IVPB .. 1000 milliGRAM(s) IV Intermittent every 6 hours  heparin   Injectable 5000 Unit(s) SubCutaneous every 8 hours  influenza   Vaccine 0.5 milliLiter(s) IntraMuscular once  lactated ringers. 1000 milliLiter(s) (40 mL/Hr) IV Continuous <Continuous>    MEDICATIONS  (PRN):  oxyCODONE    IR 5 milliGRAM(s) Oral every 4 hours PRN Severe Pain (7 - 10)  oxyCODONE    IR 2.5 milliGRAM(s) Oral every 4 hours PRN Moderate Pain (4 - 6)      LABS:                        12.0   12.15 )-----------( 365      ( 31 Jan 2024 07:50 )             38.3             Physical Exam  Gen: NAD, A&Ox3  Pulm: No respiratory distress, no subcostal retractions  CV: RRR, no JVD  Abd: Soft, NT, ND, dressings c/d/i  Extremities:  FROM, warm and well perfused, equal bilateral muscle strength  : morales, dressing in place

## 2024-01-31 NOTE — DIETITIAN INITIAL EVALUATION ADULT - REASON FOR ADMISSION
Other specified disorders of intestines    Chart reviewed, events noted. This is a 58 year old male with rectosigmoid cancer s/p laparoscopic hand assisted anterior resection, transurethral section of bladder tumor by urology.

## 2024-01-31 NOTE — PROGRESS NOTE ADULT - SUBJECTIVE AND OBJECTIVE BOX
Subjective  Patient seen and examined. Feels well. States that having some gas discomfort but denies any flank pain.    Objective    Vital signs  T(F): , Max: 99.2 (01-31-24 @ 01:00)  HR: 55 (01-31-24 @ 05:03)  BP: 112/60 (01-31-24 @ 05:03)  SpO2: 99% (01-31-24 @ 05:03)  Wt(kg): --    Output     OUT:    Indwelling Catheter - Urethral (mL): 650 mL  Total OUT: 650 mL    Total NET: -650 mL      Physical Exam  Gen: NAD  Pulm: No respiratory distress, no subcostal retractions  Abd: Soft  : ucath secured in adaptor. morales in place with pink concentrated urine    Labs          Culture - Urine (collected 01-25-24 @ 19:29)  Source: Clean Catch Clean Catch (Midstream)  Final Report (01-26-24 @ 21:55):    <10,000 CFU/mL Normal Urogenital Maia

## 2024-01-31 NOTE — PHYSICAL THERAPY INITIAL EVALUATION ADULT - DID THE PATIENT HAVE SURGERY?
s/p Cystoscopy, TURBT, right ureteral stent placement, left ureteral catheter placement prior to laparoscopic hand assisted anterior resection/yes
negative...

## 2024-01-31 NOTE — PHYSICAL THERAPY INITIAL EVALUATION ADULT - PERTINENT HX OF CURRENT PROBLEM, REHAB EVAL
57 yo male presents to [facility] prior to scheduled Laparoscopic lower anterior resection on 1/30/24 with Dr. Aquilino Alegre. Pt c/o recent changes in bowel habits (ribbon like stools); had colonoscopy which revealed large mass, path revealed adenocarcinoma. Denies past medical hx, not on any medications.    Patient was recently evaluated by Dr. Alegre and above surgery was recommended.

## 2024-01-31 NOTE — DIETITIAN INITIAL EVALUATION ADULT - NS FNS DIET ORDER
Diet, Low Fiber:   Supplement Feeding Modality:  Oral  Ensure Surgery Cans or Servings Per Day:  1       Frequency:  Three Times a day (01-31-24 @ 13:22)

## 2024-01-31 NOTE — PROGRESS NOTE ADULT - ASSESSMENT
58y Male s/p Cystoscopy, TURBT, right ureteral stent placement, left ureteral catheter placement prior to laparoscopic hand assisted anterior resection  - Trend Cr  - maintain morales catheter x 2 weeks  - keep L ucath until Thursday and can be removed by primary team at that time  - Plan to keep R stent in for 2 weeks  -  to follow  - Needs f/u with Dr. Gay in 2 weeks for morales and stent removal in the office  - remainder of care per primary team  - Discussed with Dr. Gay    [facility]  [address] [place], [place] [id number] [phone]

## 2024-01-31 NOTE — DIETITIAN INITIAL EVALUATION ADULT - ORAL INTAKE PTA/DIET HISTORY
pt reports good PO intake and appetite PTA, consumes regular diet. 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 Pt denies chewing/swallowing difficulty, nausea, vomiting, diarrhea, constipation.

## 2024-01-31 NOTE — DISCHARGE NOTE PROVIDER - NSDCCPCAREPLAN_GEN_ALL_CORE_FT
PRINCIPAL DISCHARGE DIAGNOSIS  Diagnosis: Other specified diseases of intestine  Assessment and Plan of Treatment: WOUND CARE: Staples will be removed at follow up office visit.   URINARY CATHETER: Keep in place. To be removed at outpatient Urology visit.   BATHING: Please do not submerge wound underwater. No swimming pools, no hot tubs, no tub baths. You may shower and/or sponge bathe in 24 hours. Let soapy water run over incisions. DO NOT scrub or soak incision sites. Pat dry.   ACTIVITY: No heavy lifting more than 10-15lbs or straining. Otherwise, you may return to your usual level of physical activity. You may complete your daily activities. Take frequent walks. If you are taking narcotic pain medication (such as Oxycodone), do NOT drive a car, operate machinery or make important decisions.  DIET: Low fiber diet  NOTIFY YOUR SURGEON IF: You have any bleeding that does not stop, any pus draining from your wound, any fever (over 100.4 F) or chills, persistent nausea/vomiting with inability to tolerate food or liquids, persistent diarrhea, or if your pain is not controlled on your discharge pain medications.  FOLLOW-UP:  1. Please call to make a follow-up appointment with Dr. Alegre within one week of discharge   2. Please follow up with your primary care physician in one week regarding your hospitalization.     PRINCIPAL DISCHARGE DIAGNOSIS  Diagnosis: Other specified diseases of intestine  Assessment and Plan of Treatment: WOUND CARE: Staples will be removed at follow up office visit.   URINARY CATHETER: Keep in place. To be removed at outpatient Urology visit.   BATHING: Please do not submerge wound underwater. No swimming pools, no hot tubs, no tub baths. You may shower and/or sponge bathe in 24 hours. Let soapy water run over incisions. DO NOT scrub or soak incision sites. Pat dry.   ACTIVITY: No heavy lifting more than 10-15lbs or straining. Otherwise, you may return to your usual level of physical activity. You may complete your daily activities. Take frequent walks. If you are taking narcotic pain medication (such as Oxycodone), do NOT drive a car, operate machinery or make important decisions.  DIET: Low fiber diet  NOTIFY YOUR SURGEON IF: You have any bleeding that does not stop, any pus draining from your wound, any fever (over 100.4 F) or chills, persistent nausea/vomiting with inability to tolerate food or liquids, persistent diarrhea, or if your pain is not controlled on your discharge pain medications.  FOLLOW-UP:  1. Please call to make a follow-up appointment with Dr. Alegre within one week of discharge.  2. Please follow up with Urology within 2 weeks upon discharge for morales catheter removal, please call to make an appointment.   3. Please follow up with your primary care physician in one week regarding your hospitalization.

## 2024-01-31 NOTE — PROGRESS NOTE ADULT - ASSESSMENT
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     Plan  - Morales > maintain for 2 weeks, management per Urology  - Remove L ucath tomorrow per urology   - ERP  - No toradol  - CLD + IVF > can advance to LRD by lunch   - DVT ppx: SQH  - SOPHIE remove on DC  - Needs f/u with Dr. Malcolm in 2 weeks for morales and stent removal in the office    Red Surgery  [id number]

## 2024-01-31 NOTE — PHYSICAL THERAPY INITIAL EVALUATION ADULT - PLANNED THERAPY INTERVENTIONS, PT EVAL
Goal: In 2-weeks pt will be able to ascend/descend 1 flight of stairs (I) with handrail to facilitate safe entry into home upon d/c./gait training

## 2024-01-31 NOTE — DISCHARGE NOTE PROVIDER - NSDCFUSCHEDAPPT_GEN_ALL_CORE_FT
[facility]  UROLOGY [address] [place] [address] R  Scheduled Appointment: 02/14/2024    Harleen Malcolm  [facility]  UROLOGY [address] [place] [address] R  Scheduled Appointment: 02/14/2024

## 2024-01-31 NOTE — DIETITIAN INITIAL EVALUATION ADULT - PERTINENT LABORATORY DATA
01-31    139  |  104  |  12  ----------------------------<  97  5.0   |  27  |  1.26    Ca    8.4      31 Jan 2024 07:50  Phos  4.0     01-31  Mg     2.2     01-31    A1C with Estimated Average Glucose Result: 5.5 % (01-25-24 @ 19:29)

## 2024-01-31 NOTE — PHYSICAL THERAPY INITIAL EVALUATION ADULT - NSPTDISCHREC_GEN_A_CORE
anticipate no skilled PT needs pending stair negotiation assessment and further ambulation/No skilled PT needs

## 2024-01-31 NOTE — PHYSICAL THERAPY INITIAL EVALUATION ADULT - ADDITIONAL COMMENTS
Pt lives in a private house with spouse and children with 1-2 steps to enter, 1 flight of stairs to second floor bedroom. Prior to admission, pt was (I) with ADLs and ambulation without AD.

## 2024-01-31 NOTE — DISCHARGE NOTE PROVIDER - CARE PROVIDERS DIRECT ADDRESSES
[email or web address]

## 2024-01-31 NOTE — DIETITIAN INITIAL EVALUATION ADULT - ENERGY INTAKE
In-house pt reports good tolerance to clear liquid diet. No nausea, emesis noted. Last bowel movement 1/31. Pt now advanced to low fiber diet. Pt receptive to review.  Adequate (%)

## 2024-01-31 NOTE — DISCHARGE NOTE PROVIDER - HOSPITAL COURSE
HPI:  57 yo male presents to [facility] prior to scheduled Laparoscopic lower anterior resection on 1/30/24 with Dr. Aquilino Alegre. Pt c/o recent changes in bowel habits (ribbon like stools); had colonoscopy which revealed large mass, path revealed adenocarcinoma. Denies past medical hx, not on any medications.    Patient was recently evaluated by Dr. Alegre and above surgery was recommended.    Patient presented for scheduled procedure. Underwent LAR & Cystourethroscopy with bladder tumor resection and was transferred to the PACU. When PACU criteria was met, patient was transferred to the floor for observation and continuation of ERP protocol. Postoperatively, pain well controlled. Urology followed patient for urologic needs. Paris remained in place x 2 weeks and patient to follow up with them as outpatient. PT evaluated patient - no skilled PT needs. Dietician consulted for education. Diet advanced as tolerated to low fiber diet. DVT ppx with SCDs and SQH administered throughout hospital stay. Ambulation and incentive spirometry encouraged. Home meds resumed. Labs trended. At time of discharge, patient was hemodynamically stable, tolerating LFD and pain was adequately controlled. Cleared for d/c with outpatient follow up.    HPI:  57 yo male [facility] prior to scheduled Laparoscopic lower anterior resection on 1/30/24 with Dr. Aquilino Alegre. Pt c/o recent changes in bowel habits (ribbon like stools); had colonoscopy which revealed large mass, path revealed adenocarcinoma. Denies past medical hx, not on any medications.    Patient was recently evaluated by Dr. Alegre and above surgery was recommended.    Patient presented for scheduled procedure. Underwent LAR & Cystourethroscopy with bladder tumor resection and was transferred to the PACU. When PACU criteria was met, patient was transferred to the floor for observation and continuation of ERP protocol. Postoperatively, pain well controlled. Urology followed patient for urologic needs. Paris remained in place x 2 weeks and patient to follow up with them as outpatient. PT evaluated patient - no skilled PT needs. Dietician consulted for education. Diet advanced as tolerated to low fiber diet. DVT ppx with SCDs and SQH administered throughout hospital stay. Ambulation and incentive spirometry encouraged. Home meds resumed. Labs trended, patient developed an LAYTON. Urology and nephrology both consulted for recommendations. Renal ultrasound negative. Patient resuscitated and Cr trended down. At time of discharge, patient was hemodynamically stable, tolerating LFD and pain was adequately controlled. Cleared for d/c with outpatient follow up.

## 2024-01-31 NOTE — DIETITIAN INITIAL EVALUATION ADULT - ADD RECOMMEND
1) Continue current low fiber diet as tolerated. 2) Diet education provided, reinforce as needed. 3) RD to remain available and follow-up as medically appropriate.

## 2024-01-31 NOTE — DISCHARGE NOTE PROVIDER - CARE PROVIDER_API CALL
Aquilino Alegre  Colon/Rectal Surgery  [address] [place] [address] [address]  [place] [address]  Phone: [phone]  Fax: [phone]  Follow Up Time: 1 week    Harleen Malcolm  Urology  [address] [place] [address], [place] [address]  [place] [address]  Phone: [phone]  Fax: [phone]  Follow Up Time: 2 weeks

## 2024-01-31 NOTE — DIETITIAN INITIAL EVALUATION ADULT - EDUCATION DIETARY MODIFICATIONS
Provided low-fiber nutrition therapy including importance of avoiding  fiber rich foods, fresh fruits/vegetables, whole grains, and added fiber in processed foods. Discussed chewing foods well and adequate hydration and protein intake. Discussed gradual reintroduction of fiber back into diet once cleared by MD. Pt verbalized understanding and accepted written handout. Patient with no nutrition-related questions at this time. Made aware RD remains available as needed./teach back/(2) meets goals/outcomes/verbalization Topical Sulfur Applications Counseling: Topical Sulfur Counseling: Patient counseled that this medication may cause skin irritation or allergic reactions.  In the event of skin irritation, the patient was advised to reduce the amount of the drug applied or use it less frequently.   The patient verbalized understanding of the proper use and possible adverse effects of topical sulfur application.  All of the patient's questions and concerns were addressed.

## 2024-01-31 NOTE — DISCHARGE NOTE PROVIDER - PROVIDER TOKENS
PROVIDER:[TOKEN:[[id number]],FOLLOWUP:[1 week]],PROVIDER:[TOKEN:[[id number]],FOLLOWUP:[2 weeks]]

## 2024-02-01 LAB
ANION GAP SERPL CALC-SCNC: 7 MMOL/L — SIGNIFICANT CHANGE UP (ref 5–17)
BUN SERPL-MCNC: 10 MG/DL — SIGNIFICANT CHANGE UP (ref 7–23)
CALCIUM SERPL-MCNC: 9 MG/DL — SIGNIFICANT CHANGE UP (ref 8.4–10.5)
CHLORIDE SERPL-SCNC: 102 MMOL/L — SIGNIFICANT CHANGE UP (ref 96–108)
CO2 SERPL-SCNC: 29 MMOL/L — SIGNIFICANT CHANGE UP (ref 22–31)
CREAT SERPL-MCNC: 1.17 MG/DL — SIGNIFICANT CHANGE UP (ref 0.5–1.3)
EGFR: 72 ML/MIN/1.73M2 — SIGNIFICANT CHANGE UP
GLUCOSE SERPL-MCNC: 86 MG/DL — SIGNIFICANT CHANGE UP (ref 70–99)
HCT VFR BLD CALC: 35.9 % — LOW (ref 39–50)
HGB BLD-MCNC: 11.4 G/DL — LOW (ref 13–17)
MAGNESIUM SERPL-MCNC: 2.3 MG/DL — SIGNIFICANT CHANGE UP (ref 1.6–2.6)
MCHC RBC-ENTMCNC: 28 PG — SIGNIFICANT CHANGE UP (ref 27–34)
MCHC RBC-ENTMCNC: 31.8 GM/DL — LOW (ref 32–36)
MCV RBC AUTO: 88.2 FL — SIGNIFICANT CHANGE UP (ref 80–100)
NRBC # BLD: 0 /100 WBCS — SIGNIFICANT CHANGE UP (ref 0–0)
PHOSPHATE SERPL-MCNC: 2.4 MG/DL — LOW (ref 2.5–4.5)
PLATELET # BLD AUTO: 342 K/UL — SIGNIFICANT CHANGE UP (ref 150–400)
POTASSIUM SERPL-MCNC: 4.9 MMOL/L — SIGNIFICANT CHANGE UP (ref 3.5–5.3)
POTASSIUM SERPL-SCNC: 4.9 MMOL/L — SIGNIFICANT CHANGE UP (ref 3.5–5.3)
RBC # BLD: 4.07 M/UL — LOW (ref 4.2–5.8)
RBC # FLD: 13 % — SIGNIFICANT CHANGE UP (ref 10.3–14.5)
SODIUM SERPL-SCNC: 138 MMOL/L — SIGNIFICANT CHANGE UP (ref 135–145)
WBC # BLD: 9.18 K/UL — SIGNIFICANT CHANGE UP (ref 3.8–10.5)
WBC # FLD AUTO: 9.18 K/UL — SIGNIFICANT CHANGE UP (ref 3.8–10.5)

## 2024-02-01 PROCEDURE — 99232 SBSQ HOSP IP/OBS MODERATE 35: CPT | Mod: 24,25

## 2024-02-01 RX ORDER — SODIUM CHLORIDE 9 MG/ML
1000 INJECTION, SOLUTION INTRAVENOUS
Refills: 0 | Status: DISCONTINUED | OUTPATIENT
Start: 2024-02-01 | End: 2024-02-05

## 2024-02-01 RX ORDER — ACETAMINOPHEN 500 MG
1000 TABLET ORAL EVERY 6 HOURS
Refills: 0 | Status: COMPLETED | OUTPATIENT
Start: 2024-02-01 | End: 2024-02-02

## 2024-02-01 RX ORDER — HYDROMORPHONE HYDROCHLORIDE 2 MG/ML
0.25 INJECTION INTRAMUSCULAR; INTRAVENOUS; SUBCUTANEOUS ONCE
Refills: 0 | Status: DISCONTINUED | OUTPATIENT
Start: 2024-02-01 | End: 2024-02-01

## 2024-02-01 RX ADMIN — OXYCODONE HYDROCHLORIDE 5 MILLIGRAM(S): 5 TABLET ORAL at 08:55

## 2024-02-01 RX ADMIN — SODIUM CHLORIDE 100 MILLILITER(S): 9 INJECTION, SOLUTION INTRAVENOUS at 11:43

## 2024-02-01 RX ADMIN — Medication 400 MILLIGRAM(S): at 17:44

## 2024-02-01 RX ADMIN — Medication 1000 MILLIGRAM(S): at 18:14

## 2024-02-01 RX ADMIN — OXYCODONE HYDROCHLORIDE 2.5 MILLIGRAM(S): 5 TABLET ORAL at 21:34

## 2024-02-01 RX ADMIN — OXYCODONE HYDROCHLORIDE 5 MILLIGRAM(S): 5 TABLET ORAL at 00:00

## 2024-02-01 RX ADMIN — Medication 1000 MILLIGRAM(S): at 23:19

## 2024-02-01 RX ADMIN — Medication 85 MILLIMOLE(S): at 10:52

## 2024-02-01 RX ADMIN — OXYCODONE HYDROCHLORIDE 5 MILLIGRAM(S): 5 TABLET ORAL at 03:16

## 2024-02-01 RX ADMIN — OXYCODONE HYDROCHLORIDE 5 MILLIGRAM(S): 5 TABLET ORAL at 14:08

## 2024-02-01 RX ADMIN — Medication 1000 MILLIGRAM(S): at 12:13

## 2024-02-01 RX ADMIN — OXYCODONE HYDROCHLORIDE 5 MILLIGRAM(S): 5 TABLET ORAL at 14:38

## 2024-02-01 RX ADMIN — OXYCODONE HYDROCHLORIDE 2.5 MILLIGRAM(S): 5 TABLET ORAL at 21:04

## 2024-02-01 RX ADMIN — HEPARIN SODIUM 5000 UNIT(S): 5000 INJECTION INTRAVENOUS; SUBCUTANEOUS at 14:08

## 2024-02-01 RX ADMIN — Medication 1000 MILLIGRAM(S): at 05:11

## 2024-02-01 RX ADMIN — OXYCODONE HYDROCHLORIDE 5 MILLIGRAM(S): 5 TABLET ORAL at 04:15

## 2024-02-01 RX ADMIN — Medication 400 MILLIGRAM(S): at 22:49

## 2024-02-01 RX ADMIN — HYDROMORPHONE HYDROCHLORIDE 0.25 MILLIGRAM(S): 2 INJECTION INTRAMUSCULAR; INTRAVENOUS; SUBCUTANEOUS at 15:19

## 2024-02-01 RX ADMIN — HEPARIN SODIUM 5000 UNIT(S): 5000 INJECTION INTRAVENOUS; SUBCUTANEOUS at 21:04

## 2024-02-01 RX ADMIN — HYDROMORPHONE HYDROCHLORIDE 0.25 MILLIGRAM(S): 2 INJECTION INTRAMUSCULAR; INTRAVENOUS; SUBCUTANEOUS at 15:49

## 2024-02-01 RX ADMIN — HEPARIN SODIUM 5000 UNIT(S): 5000 INJECTION INTRAVENOUS; SUBCUTANEOUS at 05:11

## 2024-02-01 RX ADMIN — OXYCODONE HYDROCHLORIDE 5 MILLIGRAM(S): 5 TABLET ORAL at 09:25

## 2024-02-01 RX ADMIN — Medication 400 MILLIGRAM(S): at 11:43

## 2024-02-01 RX ADMIN — Medication 1000 MILLIGRAM(S): at 06:02

## 2024-02-01 RX ADMIN — Medication 1000 MILLIGRAM(S): at 00:00

## 2024-02-01 NOTE — PROGRESS NOTE ADULT - ATTENDING COMMENTS
As above  Cr 1.17 (was 1.4)   Left ureteral catheter removed at bedside, pt tolerated well  Paris to leg bag  2 weeks outpatient follow up for Stent and Paris catheter removal
As above  Abd soft  dressing clean  Paris and left ureteral catheter in place  Plan to remove Left ureteral catheter tomorrow if Cr improves  Right ureteral stent and Paris catheter will be removed in 2 weeks as outpatient

## 2024-02-01 NOTE — PROGRESS NOTE ADULT - ASSESSMENT
58y Male s/p Cystoscopy, TURBT, right ureteral stent placement, left ureteral catheter placement prior to laparoscopic hand assisted anterior resection  - Trend Cr  - maintain morales catheter x 2 weeks  - L Ucath and can be removed by primary team today  - Plan to keep R stent in for 2 weeks  - Needs f/u with Dr. Gay in 2 weeks for morales and stent removal in the office  - remainder of care per primary team  - Discussed with Dr. Gay    [facility] Urology  [address] [place] [address] [phone]

## 2024-02-01 NOTE — PROGRESS NOTE ADULT - SUBJECTIVE AND OBJECTIVE BOX
SURGERY DAILY PROGRESS NOTE:       Subjective / Overnight events:  No acute overnight events.  Tolerating diet, denies N/V.  c/o pain relieved with medications.  +flatus      Objective:      MEDICATIONS  (STANDING):  acetaminophen     Tablet .. 1000 milliGRAM(s) Oral every 6 hours  heparin   Injectable 5000 Unit(s) SubCutaneous every 8 hours  influenza   Vaccine 0.5 milliLiter(s) IntraMuscular once    MEDICATIONS  (PRN):  oxyCODONE    IR 5 milliGRAM(s) Oral every 4 hours PRN Severe Pain (7 - 10)  oxyCODONE    IR 2.5 milliGRAM(s) Oral every 4 hours PRN Moderate Pain (4 - 6)      Vital Signs Last 24 Hrs  T(C): 36.7 (01 Feb 2024 04:56), Max: 37.8 (31 Jan 2024 20:35)  T(F): 98.1 (01 Feb 2024 04:56), Max: 100 (31 Jan 2024 20:35)  HR: 61 (01 Feb 2024 04:56) (61 - 69)  BP: 148/77 (01 Feb 2024 04:56) (119/69 - 148/77)  BP(mean): --  RR: 18 (01 Feb 2024 04:56) (18 - 18)  SpO2: 97% (01 Feb 2024 04:56) (96% - 99%)    Parameters below as of 01 Feb 2024 04:56  Patient On (Oxygen Delivery Method): room air        I&O's Detail    31 Jan 2024 07:01  -  01 Feb 2024 07:00  --------------------------------------------------------  IN:    IV PiggyBack: 200 mL    Oral Fluid: 960 mL  Total IN: 1160 mL    OUT:    Indwelling Catheter - Urethral (mL): 1750 mL  Total OUT: 1750 mL    Total NET: -590 mL          Daily     Daily     LABS:                        11.4   9.18  )-----------( 342      ( 01 Feb 2024 07:17 )             35.9     01-31    139  |  104  |  12  ----------------------------<  97  5.0   |  27  |  1.26    Ca    8.4      31 Jan 2024 07:50  Phos  4.0     01-31  Mg     2.2     01-31        Urinalysis Basic - ( 31 Jan 2024 07:50 )    Color: x / Appearance: x / SG: x / pH: x  Gluc: 97 mg/dL / Ketone: x  / Bili: x / Urobili: x   Blood: x / Protein: x / Nitrite: x   Leuk Esterase: x / RBC: x / WBC x   Sq Epi: x / Non Sq Epi: x / Bacteria: x          Physical Exam  Gen: NAD, A&Ox3  Pulm: No respiratory distress, no subcostal retractions  CV: RRR, no JVD  Abd: Soft, mildly distended, appropriately tender, dressings c/d/i, carlos in place  Extremities:  FROM, warm and well perfused, equal bilateral muscle strength  : morales, dressing in place

## 2024-02-01 NOTE — PROGRESS NOTE ADULT - SUBJECTIVE AND OBJECTIVE BOX
Subjective  Patient seen and examined. Resting comfortably.    Objective    Vital signs  T(F): , Max: 100 (01-31-24 @ 20:35)  HR: 61 (02-01-24 @ 04:56)  BP: 148/77 (02-01-24 @ 04:56)  SpO2: 97% (02-01-24 @ 04:56)  Wt(kg): --    Output     OUT:    Indwelling Catheter - Urethral (mL): 650 mL  Total OUT: 650 mL    Total NET: -650 mL      OUT:    Indwelling Catheter - Urethral (mL): 1750 mL  Total OUT: 1750 mL    Total NET: -1750 mL          Gen: NAD  Abd: soft, nontender, nondistended  : morales secured in place, draining pink tinged urine    Labs      01-31 @ 07:50    WBC 12.15 / Hct 38.3  / SCr 1.26         Culture - Urine (collected 01-25-24 @ 19:29)  Source: Clean Catch Clean Catch (Midstream)  Final Report (01-26-24 @ 21:55):    <10,000 CFU/mL Normal Urogenital Maia

## 2024-02-02 LAB
ANION GAP SERPL CALC-SCNC: 7 MMOL/L — SIGNIFICANT CHANGE UP (ref 5–17)
ANION GAP SERPL CALC-SCNC: 8 MMOL/L — SIGNIFICANT CHANGE UP (ref 5–17)
BUN SERPL-MCNC: 11 MG/DL — SIGNIFICANT CHANGE UP (ref 7–23)
BUN SERPL-MCNC: 11 MG/DL — SIGNIFICANT CHANGE UP (ref 7–23)
CALCIUM SERPL-MCNC: 8.7 MG/DL — SIGNIFICANT CHANGE UP (ref 8.4–10.5)
CALCIUM SERPL-MCNC: 8.7 MG/DL — SIGNIFICANT CHANGE UP (ref 8.4–10.5)
CHLORIDE SERPL-SCNC: 102 MMOL/L — SIGNIFICANT CHANGE UP (ref 96–108)
CHLORIDE SERPL-SCNC: 104 MMOL/L — SIGNIFICANT CHANGE UP (ref 96–108)
CO2 SERPL-SCNC: 28 MMOL/L — SIGNIFICANT CHANGE UP (ref 22–31)
CO2 SERPL-SCNC: 28 MMOL/L — SIGNIFICANT CHANGE UP (ref 22–31)
CREAT SERPL-MCNC: 1.71 MG/DL — HIGH (ref 0.5–1.3)
CREAT SERPL-MCNC: 1.81 MG/DL — HIGH (ref 0.5–1.3)
EGFR: 43 ML/MIN/1.73M2 — LOW
EGFR: 46 ML/MIN/1.73M2 — LOW
GLUCOSE SERPL-MCNC: 101 MG/DL — HIGH (ref 70–99)
GLUCOSE SERPL-MCNC: 115 MG/DL — HIGH (ref 70–99)
HCT VFR BLD CALC: 33.7 % — LOW (ref 39–50)
HGB BLD-MCNC: 10.8 G/DL — LOW (ref 13–17)
MAGNESIUM SERPL-MCNC: 1.9 MG/DL — SIGNIFICANT CHANGE UP (ref 1.6–2.6)
MCHC RBC-ENTMCNC: 28.4 PG — SIGNIFICANT CHANGE UP (ref 27–34)
MCHC RBC-ENTMCNC: 32 GM/DL — SIGNIFICANT CHANGE UP (ref 32–36)
MCV RBC AUTO: 88.7 FL — SIGNIFICANT CHANGE UP (ref 80–100)
NRBC # BLD: 0 /100 WBCS — SIGNIFICANT CHANGE UP (ref 0–0)
PHOSPHATE SERPL-MCNC: 2.9 MG/DL — SIGNIFICANT CHANGE UP (ref 2.5–4.5)
PLATELET # BLD AUTO: 359 K/UL — SIGNIFICANT CHANGE UP (ref 150–400)
POTASSIUM SERPL-MCNC: 4.5 MMOL/L — SIGNIFICANT CHANGE UP (ref 3.5–5.3)
POTASSIUM SERPL-MCNC: 4.8 MMOL/L — SIGNIFICANT CHANGE UP (ref 3.5–5.3)
POTASSIUM SERPL-SCNC: 4.5 MMOL/L — SIGNIFICANT CHANGE UP (ref 3.5–5.3)
POTASSIUM SERPL-SCNC: 4.8 MMOL/L — SIGNIFICANT CHANGE UP (ref 3.5–5.3)
RBC # BLD: 3.8 M/UL — LOW (ref 4.2–5.8)
RBC # FLD: 12.9 % — SIGNIFICANT CHANGE UP (ref 10.3–14.5)
SODIUM SERPL-SCNC: 138 MMOL/L — SIGNIFICANT CHANGE UP (ref 135–145)
SODIUM SERPL-SCNC: 139 MMOL/L — SIGNIFICANT CHANGE UP (ref 135–145)
WBC # BLD: 9.55 K/UL — SIGNIFICANT CHANGE UP (ref 3.8–10.5)
WBC # FLD AUTO: 9.55 K/UL — SIGNIFICANT CHANGE UP (ref 3.8–10.5)

## 2024-02-02 PROCEDURE — 76770 US EXAM ABDO BACK WALL COMP: CPT | Mod: 26

## 2024-02-02 RX ORDER — MAGNESIUM SULFATE 500 MG/ML
1 VIAL (ML) INJECTION ONCE
Refills: 0 | Status: COMPLETED | OUTPATIENT
Start: 2024-02-02 | End: 2024-02-02

## 2024-02-02 RX ORDER — OXYCODONE HYDROCHLORIDE 5 MG/1
1 TABLET ORAL
Qty: 10 | Refills: 0
Start: 2024-02-02

## 2024-02-02 RX ORDER — ACETAMINOPHEN 500 MG
3 TABLET ORAL
Qty: 0 | Refills: 0 | DISCHARGE
Start: 2024-02-02

## 2024-02-02 RX ORDER — ACETAMINOPHEN 500 MG
975 TABLET ORAL EVERY 6 HOURS
Refills: 0 | Status: DISCONTINUED | OUTPATIENT
Start: 2024-02-02 | End: 2024-02-05

## 2024-02-02 RX ADMIN — OXYCODONE HYDROCHLORIDE 5 MILLIGRAM(S): 5 TABLET ORAL at 21:48

## 2024-02-02 RX ADMIN — Medication 1000 MILLIGRAM(S): at 05:13

## 2024-02-02 RX ADMIN — Medication 975 MILLIGRAM(S): at 12:22

## 2024-02-02 RX ADMIN — Medication 975 MILLIGRAM(S): at 23:54

## 2024-02-02 RX ADMIN — OXYCODONE HYDROCHLORIDE 5 MILLIGRAM(S): 5 TABLET ORAL at 09:15

## 2024-02-02 RX ADMIN — Medication 975 MILLIGRAM(S): at 17:20

## 2024-02-02 RX ADMIN — Medication 100 GRAM(S): at 09:15

## 2024-02-02 RX ADMIN — Medication 975 MILLIGRAM(S): at 17:50

## 2024-02-02 RX ADMIN — OXYCODONE HYDROCHLORIDE 5 MILLIGRAM(S): 5 TABLET ORAL at 22:18

## 2024-02-02 RX ADMIN — Medication 400 MILLIGRAM(S): at 04:43

## 2024-02-02 RX ADMIN — OXYCODONE HYDROCHLORIDE 5 MILLIGRAM(S): 5 TABLET ORAL at 15:25

## 2024-02-02 RX ADMIN — Medication 975 MILLIGRAM(S): at 23:24

## 2024-02-02 RX ADMIN — HEPARIN SODIUM 5000 UNIT(S): 5000 INJECTION INTRAVENOUS; SUBCUTANEOUS at 05:42

## 2024-02-02 RX ADMIN — OXYCODONE HYDROCHLORIDE 5 MILLIGRAM(S): 5 TABLET ORAL at 15:55

## 2024-02-02 RX ADMIN — OXYCODONE HYDROCHLORIDE 5 MILLIGRAM(S): 5 TABLET ORAL at 09:45

## 2024-02-02 RX ADMIN — HEPARIN SODIUM 5000 UNIT(S): 5000 INJECTION INTRAVENOUS; SUBCUTANEOUS at 13:42

## 2024-02-02 RX ADMIN — HEPARIN SODIUM 5000 UNIT(S): 5000 INJECTION INTRAVENOUS; SUBCUTANEOUS at 21:33

## 2024-02-02 RX ADMIN — Medication 975 MILLIGRAM(S): at 11:52

## 2024-02-02 NOTE — PROGRESS NOTE ADULT - SUBJECTIVE AND OBJECTIVE BOX
Red Team Surgery Daily Resident Progress Note    Subjective / Overnight events:  No acute overnight events.  Tolerating diet, denies N/V.  c/o pain relieved with medications.  +flatus    OBJECTIVE:  Vital Signs Last 24 Hrs  T(C): 36.8 (02 Feb 2024 03:59), Max: 37.2 (01 Feb 2024 17:41)  T(F): 98.2 (02 Feb 2024 03:59), Max: 99 (01 Feb 2024 21:14)  HR: 61 (02 Feb 2024 03:59) (55 - 73)  BP: 125/76 (02 Feb 2024 03:59) (119/74 - 150/88)  BP(mean): --  RR: 18 (02 Feb 2024 03:59) (18 - 18)  SpO2: 97% (02 Feb 2024 03:59) (94% - 98%)    Parameters below as of 02 Feb 2024 03:59  Patient On (Oxygen Delivery Method): room air      Physical Exam:  Gen: NAD, A&Ox3  Pulm: No respiratory distress, no subcostal retractions  CV: RRR, no JVD  Abd: Soft, mildly distended, appropriately tender, dressings c/d/i, carlos in place  Extremities:  FROM, warm and well perfused, equal bilateral muscle strength  : morales, dressing in place    dextrose 5% + lactated ringers. milliLiter(s) (100 mL/Hr)      Medications:  MEDICATIONS  (STANDING):  dextrose 5% + lactated ringers. 1000 milliLiter(s) (100 mL/Hr) IV Continuous <Continuous>  heparin   Injectable 5000 Unit(s) SubCutaneous every 8 hours  influenza   Vaccine 0.5 milliLiter(s) IntraMuscular once    MEDICATIONS  (PRN):  oxyCODONE    IR 5 milliGRAM(s) Oral every 4 hours PRN Severe Pain (7 - 10)  oxyCODONE    IR 2.5 milliGRAM(s) Oral every 4 hours PRN Moderate Pain (4 - 6)    Allergies:  No Known Drug Allergies  buckwheat (Anaphylaxis; Angioedema)      Labs:  02-02    138  |  102  |  11  ----------------------------<  101<H>  4.8   |  28  |  1.71<H>    Ca    8.7      02 Feb 2024 07:09  Phos  2.9     02-02  Mg     1.9     02-02                            10.8   9.55  )-----------( 359      ( 02 Feb 2024 07:09 )             33.7

## 2024-02-02 NOTE — PROGRESS NOTE ADULT - SUBJECTIVE AND OBJECTIVE BOX
The patient was seen and examined at bedside.  No acute events overnight.  The patient is having pain in the left flank currently.  States that he had the pain for a few hours after the left ureteral catheter was removed yesterday but the pain recurred today.    T(C): 37 (02-02-24 @ 12:51), Max: 37.2 (02-01-24 @ 17:41)  HR: 63 (02-02-24 @ 12:51) (55 - 73)  BP: 150/79 (02-02-24 @ 12:51) (119/74 - 150/88)  RR: 18 (02-02-24 @ 12:51) (18 - 18)  SpO2: 97% (02-02-24 @ 12:51) (94% - 97%)  Wt(kg): --    Physical Exam:    General: NAD, A+Ox3  Abdomen: soft, non-tender, non-distended      02-01 @ 07:01  -  02-02 @ 07:00  --------------------------------------------------------  IN: 3160 mL / OUT: 1700 mL / NET: 1460 mL    02-02 @ 07:01  -  02-02 @ 15:09  --------------------------------------------------------  IN: 460 mL / OUT: 750 mL / NET: -290 mL      F - 750cc clear   The patient was seen and examined at bedside.  No acute events overnight.  The patient is having pain in the left flank currently.  States that he had the pain for a few hours after the left ureteral catheter was removed yesterday but the pain recurred today.  Denies fevers.    T(C): 37 (02-02-24 @ 12:51), Max: 37.2 (02-01-24 @ 17:41)  HR: 63 (02-02-24 @ 12:51) (55 - 73)  BP: 150/79 (02-02-24 @ 12:51) (119/74 - 150/88)  RR: 18 (02-02-24 @ 12:51) (18 - 18)  SpO2: 97% (02-02-24 @ 12:51) (94% - 97%)  Wt(kg): --    Physical Exam:    General: NAD, A+Ox3  Abdomen: soft, non-tender, non-distended      02-01 @ 07:01  -  02-02 @ 07:00  --------------------------------------------------------  IN: 3160 mL / OUT: 1700 mL / NET: 1460 mL    02-02 @ 07:01  -  02-02 @ 15:09  --------------------------------------------------------  IN: 460 mL / OUT: 750 mL / NET: -290 mL      F - 750cc clear   The patient was seen and examined at bedside.  No acute events overnight.  The patient is having pain in the left flank currently.  States that he had the pain for a few hours after the left ureteral catheter was removed yesterday but the pain recurred today.  Denies fevers.    T(C): 37 (02-02-24 @ 12:51), Max: 37.2 (02-01-24 @ 17:41)  HR: 63 (02-02-24 @ 12:51) (55 - 73)  BP: 150/79 (02-02-24 @ 12:51) (119/74 - 150/88)  RR: 18 (02-02-24 @ 12:51) (18 - 18)  SpO2: 97% (02-02-24 @ 12:51) (94% - 97%)  Wt(kg): --    Physical Exam:    General: NAD, A+Ox3  Abdomen: soft, +left abdominal tenderness  Back: +L mild CVAT      02-01 @ 07:01  -  02-02 @ 07:00  --------------------------------------------------------  IN: 3160 mL / OUT: 1700 mL / NET: 1460 mL    02-02 @ 07:01  -  02-02 @ 15:09  --------------------------------------------------------  IN: 460 mL / OUT: 750 mL / NET: -290 mL      F - 750cc clear

## 2024-02-02 NOTE — PROGRESS NOTE ADULT - ASSESSMENT
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     Plan  - ERP  - No toradol  - LRD for lunch   - DVT ppx: SQH  - SOPHIE remove on DC  - Morales > maintain for 2 weeks, management per Urology  - Needs f/u with Dr. 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     Plan  - ERP  - No toradol  - LRD for lunch, plan to d/c home if tolerates   - DVT ppx: SQH  - SOPHIE remove on DC  - Morales > maintain for 2 weeks, management per Urology  - Needs f/u with Dr. Malcolm in 2 weeks for morales and stent removal in the office    Red Surgery  [id number]

## 2024-02-02 NOTE — PROGRESS NOTE ADULT - ASSESSMENT
58y Male s/p Cystoscopy, TURBT, right ureteral stent placement, left ureteral catheter placement prior to laparoscopic hand assisted anterior resection; left ureteral catheter was removed 2/1    - creatinine increased from 1.17 -> 1.7 today  - obtain renal and bladder ultrasound urgent to assess for hydronephrosis  - Needs f/u with Dr. Malcolm in 2 weeks for morales and R stent removal in the office  - remainder of care per primary team  - Discussed with Dr. Malcolm    [facility] for Urology  [address] [place] [address] [phone]

## 2024-02-03 DIAGNOSIS — N17.9 ACUTE KIDNEY FAILURE, UNSPECIFIED: ICD-10-CM

## 2024-02-03 LAB
ANION GAP SERPL CALC-SCNC: 9 MMOL/L — SIGNIFICANT CHANGE UP (ref 5–17)
BUN SERPL-MCNC: 11 MG/DL — SIGNIFICANT CHANGE UP (ref 7–23)
CALCIUM SERPL-MCNC: 9.5 MG/DL — SIGNIFICANT CHANGE UP (ref 8.4–10.5)
CHLORIDE SERPL-SCNC: 104 MMOL/L — SIGNIFICANT CHANGE UP (ref 96–108)
CO2 SERPL-SCNC: 29 MMOL/L — SIGNIFICANT CHANGE UP (ref 22–31)
CREAT ?TM UR-MCNC: 195 MG/DL — SIGNIFICANT CHANGE UP
CREAT SERPL-MCNC: 1.87 MG/DL — HIGH (ref 0.5–1.3)
EGFR: 41 ML/MIN/1.73M2 — LOW
GLUCOSE SERPL-MCNC: 113 MG/DL — HIGH (ref 70–99)
HCT VFR BLD CALC: 38.2 % — LOW (ref 39–50)
HGB BLD-MCNC: 12.3 G/DL — LOW (ref 13–17)
MAGNESIUM SERPL-MCNC: 2.1 MG/DL — SIGNIFICANT CHANGE UP (ref 1.6–2.6)
MCHC RBC-ENTMCNC: 28.4 PG — SIGNIFICANT CHANGE UP (ref 27–34)
MCHC RBC-ENTMCNC: 32.2 GM/DL — SIGNIFICANT CHANGE UP (ref 32–36)
MCV RBC AUTO: 88.2 FL — SIGNIFICANT CHANGE UP (ref 80–100)
NRBC # BLD: 0 /100 WBCS — SIGNIFICANT CHANGE UP (ref 0–0)
PHOSPHATE SERPL-MCNC: 3.2 MG/DL — SIGNIFICANT CHANGE UP (ref 2.5–4.5)
PLATELET # BLD AUTO: 390 K/UL — SIGNIFICANT CHANGE UP (ref 150–400)
POTASSIUM SERPL-MCNC: 5.2 MMOL/L — SIGNIFICANT CHANGE UP (ref 3.5–5.3)
POTASSIUM SERPL-SCNC: 5.2 MMOL/L — SIGNIFICANT CHANGE UP (ref 3.5–5.3)
RBC # BLD: 4.33 M/UL — SIGNIFICANT CHANGE UP (ref 4.2–5.8)
RBC # FLD: 12.8 % — SIGNIFICANT CHANGE UP (ref 10.3–14.5)
SODIUM SERPL-SCNC: 142 MMOL/L — SIGNIFICANT CHANGE UP (ref 135–145)
SODIUM UR-SCNC: 147 MMOL/L — SIGNIFICANT CHANGE UP
WBC # BLD: 10.72 K/UL — HIGH (ref 3.8–10.5)
WBC # FLD AUTO: 10.72 K/UL — HIGH (ref 3.8–10.5)

## 2024-02-03 PROCEDURE — 99222 1ST HOSP IP/OBS MODERATE 55: CPT

## 2024-02-03 RX ADMIN — SODIUM CHLORIDE 100 MILLILITER(S): 9 INJECTION, SOLUTION INTRAVENOUS at 22:12

## 2024-02-03 RX ADMIN — Medication 975 MILLIGRAM(S): at 17:51

## 2024-02-03 RX ADMIN — HEPARIN SODIUM 5000 UNIT(S): 5000 INJECTION INTRAVENOUS; SUBCUTANEOUS at 13:41

## 2024-02-03 RX ADMIN — Medication 975 MILLIGRAM(S): at 05:48

## 2024-02-03 RX ADMIN — Medication 975 MILLIGRAM(S): at 14:15

## 2024-02-03 RX ADMIN — HEPARIN SODIUM 5000 UNIT(S): 5000 INJECTION INTRAVENOUS; SUBCUTANEOUS at 05:18

## 2024-02-03 RX ADMIN — Medication 975 MILLIGRAM(S): at 13:40

## 2024-02-03 RX ADMIN — Medication 975 MILLIGRAM(S): at 18:21

## 2024-02-03 RX ADMIN — HEPARIN SODIUM 5000 UNIT(S): 5000 INJECTION INTRAVENOUS; SUBCUTANEOUS at 22:41

## 2024-02-03 RX ADMIN — OXYCODONE HYDROCHLORIDE 5 MILLIGRAM(S): 5 TABLET ORAL at 23:24

## 2024-02-03 RX ADMIN — Medication 975 MILLIGRAM(S): at 05:18

## 2024-02-03 RX ADMIN — Medication 975 MILLIGRAM(S): at 22:41

## 2024-02-03 RX ADMIN — Medication 975 MILLIGRAM(S): at 22:13

## 2024-02-03 NOTE — CONSULT NOTE ADULT - PROBLEM SELECTOR RECOMMENDATION 9
Pt with LAYTON in setting of a urological intervention. Per chart review, Sc of 1.4 on 1/24, was 1.17 on admission and now increased to 1.87 today, but appears to be plateauing. Patient with removal of his bladder via urethra on 1/30 and had  left ureteral catheter removed on 2/2. No hypotension noted, no IV contrast or nephrotoxic agents.     Recommend repeat renal and bladder sono to ensure no constriction and back of urine into the kidneys. Avoid NSAIDs for pain. C/w IVF, encourage PO intake. Monitor BMP and dose meds per eGFR    If you have any questions, please feel free to contact me  Mitchell Tirado  Nephrology Fellow  [phone]; Prefer [employer] TEAMS  (After 5pm or on weekends please page the on-call fellow).    Patient was discussed with    who agrees with my A/P. Addendum to follow

## 2024-02-03 NOTE — PROGRESS NOTE ADULT - SUBJECTIVE AND OBJECTIVE BOX
Subjective  Patient seen and examined at bedside. Left flank pain resolved, comfortable.     Objective    Vital signs  T(F): , Max: 98.4 (02-02-24 @ 16:23)  HR: 61 (02-03-24 @ 09:18)  BP: 150/78 (02-03-24 @ 09:18)  SpO2: 98% (02-03-24 @ 09:18)  Wt(kg): --    Output     OUT:    Indwelling Catheter - Urethral (mL): 2350 mL  Total OUT: 2350 mL    Total NET: -2350 mL      OUT:    Voided (mL): 250 mL  Total OUT: 250 mL    Total NET: -250 mL          Gen: NAD  : morales secured in place, draining clear blood tinged urine    Labs      02-03 @ 06:37    WBC 10.72 / Hct 38.2  / SCr --       02-03 @ 06:30    WBC --    / Hct --    / SCr 1.87

## 2024-02-03 NOTE — PROGRESS NOTE ADULT - SUBJECTIVE AND OBJECTIVE BOX
SURGERY DAILY PROGRESS NOTE    SUBJECTIVE: Patient seen and evaluated on AM rounds.     Overnight Events:  No acute events overnight  -----------------------------------------------------------------------------------------------------------------------------------------------------------------------------------------------------------  OBJECTIVE:  Vital Signs Last 24 Hrs  T(C): 36.3 (03 Feb 2024 09:18), Max: 37 (02 Feb 2024 12:51)  T(F): 97.3 (03 Feb 2024 09:18), Max: 98.6 (02 Feb 2024 12:51)  HR: 61 (03 Feb 2024 09:18) (61 - 73)  BP: 150/78 (03 Feb 2024 09:18) (137/76 - 169/83)  BP(mean): --  RR: 18 (03 Feb 2024 09:18) (18 - 18)  SpO2: 98% (03 Feb 2024 09:18) (94% - 98%)    Parameters below as of 03 Feb 2024 09:18  Patient On (Oxygen Delivery Method): room air      I&O's Detail    02 Feb 2024 07:01  -  03 Feb 2024 07:00  --------------------------------------------------------  IN:    dextrose 5% + lactated ringers: 2400 mL    IV PiggyBack: 100 mL    Oral Fluid: 480 mL  Total IN: 2980 mL    OUT:    Indwelling Catheter - Urethral (mL): 2350 mL  Total OUT: 2350 mL    Total NET: 630 mL      03 Feb 2024 07:01  -  03 Feb 2024 10:10  --------------------------------------------------------  IN:    Oral Fluid: 240 mL  Total IN: 240 mL    OUT:    Voided (mL): 250 mL  Total OUT: 250 mL    Total NET: -10 mL        Daily     Daily   MEDICATIONS  (STANDING):  acetaminophen     Tablet .. 975 milliGRAM(s) Oral every 6 hours  dextrose 5% + lactated ringers. 1000 milliLiter(s) (100 mL/Hr) IV Continuous <Continuous>  heparin   Injectable 5000 Unit(s) SubCutaneous every 8 hours  influenza   Vaccine 0.5 milliLiter(s) IntraMuscular once    MEDICATIONS  (PRN):  oxyCODONE    IR 5 milliGRAM(s) Oral every 4 hours PRN Severe Pain (7 - 10)  oxyCODONE    IR 2.5 milliGRAM(s) Oral every 4 hours PRN Moderate Pain (4 - 6)      LABS:                        12.3   10.72 )-----------( 390      ( 03 Feb 2024 06:37 )             38.2     02-03    142  |  104  |  11  ----------------------------<  113<H>  5.2   |  29  |  1.87<H>    Ca    9.5      03 Feb 2024 06:30  Phos  3.2     02-03  Mg     2.1     02-03        Urinalysis Basic - ( 03 Feb 2024 06:30 )    Color: x / Appearance: x / SG: x / pH: x  Gluc: 113 mg/dL / Ketone: x  / Bili: x / Urobili: x   Blood: x / Protein: x / Nitrite: x   Leuk Esterase: x / RBC: x / WBC x   Sq Epi: x / Non Sq Epi: x / Bacteria: x      Physical Exam:  Gen: NAD, A&Ox3  Pulm: No respiratory distress, no subcostal retractions  CV: RRR, no JVD  Abd: Soft, mildly distended, appropriately tender, dressings c/d/i, carlos in place  Extremities:  FROM, warm and well perfused, equal bilateral muscle strength  : morales, dressing in place

## 2024-02-03 NOTE — CONSULT NOTE ADULT - ASSESSMENT
58 y.o M s/p cystoscopy, TURBT, right ureteral stent placement and left ureteral catheter placement with a laparoscopic hand assisted anterior resection of part of bladder tumor on 1/30.

## 2024-02-03 NOTE — PROGRESS NOTE ADULT - ASSESSMENT
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     Plan  - ERP  - Elevated Cr > US renal unremarkable, Cr today 1.8, f/u Urology recs  - No toradol  - LRD for lunch, plan to d/c home if tolerates   - DVT ppx: SQH  - SOPHIE remove on DC  - Morales > maintain for 2 weeks, management per Urology  - Needs f/u with Dr. Malcolm in 2 weeks for morales and stent removal in the office    [facility]  [id number]

## 2024-02-03 NOTE — CONSULT NOTE ADULT - ATTENDING COMMENTS
1/30 s/p ystoscopy, Transurethral resection of large bladder tumor, Bilateral retrograde pyelograms, Bilateral ureteral catheterization.  Right ureteroscopy with dilation of the right ureteral stricture, Insertion of right ureteral stent and placement of left-sided low ureteral catheter  2/1 had removal of left ureteral stent  2/2 c/o pain left flank and continues to have pain although it has lessened    #Pierce  pt has morales with good UO  cr has uptrended 1.1 to 1.7 from 1/1 to 1/2  renal sono 1/2 neg for hydro  creatinine has somewhat plateaued  seems like atn  cont IVF   recommend to repeat renal sono on 2/5 to reassess for hydro  cont to monitor cr trend  avoid nsaids (received Celecoxib x 1)  #mild hyperkalemia  repeat k later this evening  if k elevated startt lokelma 10g tid

## 2024-02-03 NOTE — CONSULT NOTE ADULT - SUBJECTIVE AND OBJECTIVE BOX
[facility] DIVISION OF KIDNEY DISEASES AND HYPERTENSION -- [phone]  -- INITIAL CONSULT NOTE  --------------------------------------------------------------------------------  HPI:  Patient is a 58 y.o M s/p cystoscopy, TURBT, right ureteral stent placement and left ureteral catheter placement with a laparoscopic hand assisted anterior resection of part of bladder tumor on 1/30. Patient recently had colonoscopy revealing large mass identified as adenocarcinoma. On 2/2, the left ureteral catheter was removed. Patient reports intermittent spasms that have decreased in intensity since the catheter was pulled. He is nonoliguric. Patient reports no history of kidney disease, but says his pre-op labs, the Scr was 1.4 and the PCP thought patient might be dehydrated since he had exercised, etc. ON admission, Scr of 1.26, incresed to 1.71 after the procedure on 1/30 and now stabilizing ~1.87 today. Nephrology consulted for LAYTON.      PAST HISTORY  --------------------------------------------------------------------------------  PAST MEDICAL & SURGICAL HISTORY:  Colon cancer      H/O inguinal hernia      H/O sigmoidoscopy      H/O inguinal hernia repair        FAMILY HISTORY:  FH: type 2 diabetes (Father)    FH: HTN (hypertension) (Father)    FH: breast cancer (Mother)    FHx: congenital heart disease (Sibling)      PAST SOCIAL HISTORY:    ALLERGIES & MEDICATIONS  --------------------------------------------------------------------------------  Allergies    No Known Drug Allergies  buckwheat (Anaphylaxis; Angioedema)    Intolerances      Standing Inpatient Medications  acetaminophen     Tablet .. 975 milliGRAM(s) Oral every 6 hours  dextrose 5% + lactated ringers. 1000 milliLiter(s) IV Continuous <Continuous>  heparin   Injectable 5000 Unit(s) SubCutaneous every 8 hours  influenza   Vaccine 0.5 milliLiter(s) IntraMuscular once    PRN Inpatient Medications  oxyCODONE    IR 5 milliGRAM(s) Oral every 4 hours PRN  oxyCODONE    IR 2.5 milliGRAM(s) Oral every 4 hours PRN      REVIEW OF SYSTEMS  --------------------------------------------------------------------------------  per above    VITALS/PHYSICAL EXAM  --------------------------------------------------------------------------------  T(C): 36.3 (02-03-24 @ 13:39), Max: 36.9 (02-02-24 @ 16:23)  HR: 74 (02-03-24 @ 13:39) (61 - 74)  BP: 155/78 (02-03-24 @ 13:39) (137/76 - 169/83)  RR: 18 (02-03-24 @ 13:39) (18 - 18)  SpO2: 99% (02-03-24 @ 13:39) (94% - 99%)  Wt(kg): --        02-02-24 @ 07:01  -  02-03-24 @ 07:00  --------------------------------------------------------  IN: 2980 mL / OUT: 2350 mL / NET: 630 mL    02-03-24 @ 07:01  -  02-03-24 @ 16:12  --------------------------------------------------------  IN: 240 mL / OUT: 525 mL / NET: -285 mL        Physical Exam:  	Gen: NAD  	HEENT: Anicteric  	Pulm: CTA B/L  	CV: S1S2+  	Abd: Soft, +BS    	Ext: No LE edema B/L  	Neuro: Awake          : Morales+ with sediments & urine in morales and bag  	Skin: Warm and dry    LABS/STUDIES  --------------------------------------------------------------------------------              12.3   10.72 >-----------<  390      [02-03-24 @ 06:37]              38.2     142  |  104  |  11  ----------------------------<  113      [02-03-24 @ 06:30]  5.2   |  29  |  1.87        Ca     9.5     [02-03-24 @ 06:30]      Mg     2.1     [02-03-24 @ 06:30]      Phos  3.2     [02-03-24 @ 06:30]            Creatinine Trend:  SCr 1.87 [02-03 @ 06:30]  SCr 1.81 [02-02 @ 16:12]  SCr 1.71 [02-02 @ 07:09]  SCr 1.17 [02-01 @ 07:18]  SCr 1.26 [01-31 @ 07:50]    Urinalysis - [02-03-24 @ 06:30]      Color  / Appearance  / SG  / pH       Gluc 113 / Ketone   / Bili  / Urobili        Blood  / Protein  / Leuk Est  / Nitrite       RBC  / WBC  / Hyaline  / Gran  / Sq Epi  / Non Sq Epi  / Bacteria           Tacrolimus  Cyclosporine  Sirolimus  Mycophenolate  BK PCR  CMV PCR  Parvo PCR  EBV PCR [facility] DIVISION OF KIDNEY DISEASES AND HYPERTENSION -- [phone]  -- INITIAL CONSULT NOTE  --------------------------------------------------------------------------------  HPI:  Patient is a 58 y.o M s/p cystoscopy, TURBT, right ureteral stent placement and left ureteral catheter placement with a laparoscopic hand assisted anterior resection of part of bladder tumor on 1/30. Patient recently had colonoscopy revealing large mass identified as adenocarcinoma. On 2/2, the left ureteral catheter was removed. Patient reports intermittent spasms that have decreased in intensity since the catheter was pulled. He is nonoliguric. Patient reports no history of kidney disease, but says his pre-op labs, the Scr was 1.4 and the PCP thought patient might be dehydrated since he had exercised, etc. ON admission, Scr of 1.26, increased to 1.71 after the procedure on 1/30 and now stabilizing ~1.87 today. Nephrology consulted for LAYTON.      PAST HISTORY  --------------------------------------------------------------------------------  PAST MEDICAL & SURGICAL HISTORY:  Colon cancer      H/O inguinal hernia      H/O sigmoidoscopy      H/O inguinal hernia repair        FAMILY HISTORY:  FH: type 2 diabetes (Father)    FH: HTN (hypertension) (Father)    FH: breast cancer (Mother)    FHx: congenital heart disease (Sibling)      PAST SOCIAL HISTORY:    ALLERGIES & MEDICATIONS  --------------------------------------------------------------------------------  Allergies    No Known Drug Allergies  buckwheat (Anaphylaxis; Angioedema)    Intolerances      Standing Inpatient Medications  acetaminophen     Tablet .. 975 milliGRAM(s) Oral every 6 hours  dextrose 5% + lactated ringers. 1000 milliLiter(s) IV Continuous <Continuous>  heparin   Injectable 5000 Unit(s) SubCutaneous every 8 hours  influenza   Vaccine 0.5 milliLiter(s) IntraMuscular once    PRN Inpatient Medications  oxyCODONE    IR 5 milliGRAM(s) Oral every 4 hours PRN  oxyCODONE    IR 2.5 milliGRAM(s) Oral every 4 hours PRN      REVIEW OF SYSTEMS  --------------------------------------------------------------------------------  per above    VITALS/PHYSICAL EXAM  --------------------------------------------------------------------------------  T(C): 36.3 (02-03-24 @ 13:39), Max: 36.9 (02-02-24 @ 16:23)  HR: 74 (02-03-24 @ 13:39) (61 - 74)  BP: 155/78 (02-03-24 @ 13:39) (137/76 - 169/83)  RR: 18 (02-03-24 @ 13:39) (18 - 18)  SpO2: 99% (02-03-24 @ 13:39) (94% - 99%)  Wt(kg): --        02-02-24 @ 07:01  -  02-03-24 @ 07:00  --------------------------------------------------------  IN: 2980 mL / OUT: 2350 mL / NET: 630 mL    02-03-24 @ 07:01  -  02-03-24 @ 16:12  --------------------------------------------------------  IN: 240 mL / OUT: 525 mL / NET: -285 mL        Physical Exam:  	Gen: NAD  	HEENT: Anicteric  	Pulm: CTA B/L  	CV: S1S2+  	Abd: Soft, +BS    	Ext: No LE edema B/L  	Neuro: Awake          : Morales+ with sediments & urine in morales and bag  	Skin: Warm and dry    LABS/STUDIES  --------------------------------------------------------------------------------              12.3   10.72 >-----------<  390      [02-03-24 @ 06:37]              38.2     142  |  104  |  11  ----------------------------<  113      [02-03-24 @ 06:30]  5.2   |  29  |  1.87        Ca     9.5     [02-03-24 @ 06:30]      Mg     2.1     [02-03-24 @ 06:30]      Phos  3.2     [02-03-24 @ 06:30]            Creatinine Trend:  SCr 1.87 [02-03 @ 06:30]  SCr 1.81 [02-02 @ 16:12]  SCr 1.71 [02-02 @ 07:09]  SCr 1.17 [02-01 @ 07:18]  SCr 1.26 [01-31 @ 07:50]    Urinalysis - [02-03-24 @ 06:30]      Color  / Appearance  / SG  / pH       Gluc 113 / Ketone   / Bili  / Urobili        Blood  / Protein  / Leuk Est  / Nitrite       RBC  / WBC  / Hyaline  / Gran  / Sq Epi  / Non Sq Epi  / Bacteria           Tacrolimus  Cyclosporine  Sirolimus  Mycophenolate  BK PCR  CMV PCR  Parvo PCR  EBV PCR

## 2024-02-03 NOTE — PROGRESS NOTE ADULT - ASSESSMENT
58y Male s/p Cystoscopy, TURBT, right ureteral stent placement, left ureteral catheter placement prior to laparoscopic hand assisted anterior resection; left ureteral catheter was removed 2/1    Plan:  - Creatinine stable at 1.8 today  - RBUS showing no hydro and patient making good urine  - Recommend nephrology consult for LAYTON  - Needs f/u with Dr. Malcolm in 2 weeks for morales and R stent removal in the office  - Remainder of care per primary team  - Discussed with Dr. Malcolm    [facility]  [address] [place] [phone]

## 2024-02-04 LAB
ANION GAP SERPL CALC-SCNC: 12 MMOL/L — SIGNIFICANT CHANGE UP (ref 5–17)
BUN SERPL-MCNC: 12 MG/DL — SIGNIFICANT CHANGE UP (ref 7–23)
CALCIUM SERPL-MCNC: 9.1 MG/DL — SIGNIFICANT CHANGE UP (ref 8.4–10.5)
CHLORIDE SERPL-SCNC: 102 MMOL/L — SIGNIFICANT CHANGE UP (ref 96–108)
CO2 SERPL-SCNC: 25 MMOL/L — SIGNIFICANT CHANGE UP (ref 22–31)
CREAT SERPL-MCNC: 1.78 MG/DL — HIGH (ref 0.5–1.3)
EGFR: 43 ML/MIN/1.73M2 — LOW
GLUCOSE SERPL-MCNC: 102 MG/DL — HIGH (ref 70–99)
HCT VFR BLD CALC: 33.9 % — LOW (ref 39–50)
HGB BLD-MCNC: 11 G/DL — LOW (ref 13–17)
MAGNESIUM SERPL-MCNC: 1.8 MG/DL — SIGNIFICANT CHANGE UP (ref 1.6–2.6)
MCHC RBC-ENTMCNC: 28.6 PG — SIGNIFICANT CHANGE UP (ref 27–34)
MCHC RBC-ENTMCNC: 32.4 GM/DL — SIGNIFICANT CHANGE UP (ref 32–36)
MCV RBC AUTO: 88.3 FL — SIGNIFICANT CHANGE UP (ref 80–100)
NRBC # BLD: 0 /100 WBCS — SIGNIFICANT CHANGE UP (ref 0–0)
PHOSPHATE SERPL-MCNC: 3.3 MG/DL — SIGNIFICANT CHANGE UP (ref 2.5–4.5)
PLATELET # BLD AUTO: 374 K/UL — SIGNIFICANT CHANGE UP (ref 150–400)
POTASSIUM SERPL-MCNC: 3.9 MMOL/L — SIGNIFICANT CHANGE UP (ref 3.5–5.3)
POTASSIUM SERPL-SCNC: 3.9 MMOL/L — SIGNIFICANT CHANGE UP (ref 3.5–5.3)
RBC # BLD: 3.84 M/UL — LOW (ref 4.2–5.8)
RBC # FLD: 12.9 % — SIGNIFICANT CHANGE UP (ref 10.3–14.5)
SODIUM SERPL-SCNC: 139 MMOL/L — SIGNIFICANT CHANGE UP (ref 135–145)
WBC # BLD: 9.28 K/UL — SIGNIFICANT CHANGE UP (ref 3.8–10.5)
WBC # FLD AUTO: 9.28 K/UL — SIGNIFICANT CHANGE UP (ref 3.8–10.5)

## 2024-02-04 PROCEDURE — 99232 SBSQ HOSP IP/OBS MODERATE 35: CPT

## 2024-02-04 RX ORDER — MAGNESIUM SULFATE 500 MG/ML
2 VIAL (ML) INJECTION ONCE
Refills: 0 | Status: COMPLETED | OUTPATIENT
Start: 2024-02-04 | End: 2024-02-04

## 2024-02-04 RX ADMIN — Medication 975 MILLIGRAM(S): at 05:47

## 2024-02-04 RX ADMIN — Medication 975 MILLIGRAM(S): at 11:32

## 2024-02-04 RX ADMIN — OXYCODONE HYDROCHLORIDE 5 MILLIGRAM(S): 5 TABLET ORAL at 10:55

## 2024-02-04 RX ADMIN — Medication 975 MILLIGRAM(S): at 21:00

## 2024-02-04 RX ADMIN — HEPARIN SODIUM 5000 UNIT(S): 5000 INJECTION INTRAVENOUS; SUBCUTANEOUS at 05:12

## 2024-02-04 RX ADMIN — OXYCODONE HYDROCHLORIDE 5 MILLIGRAM(S): 5 TABLET ORAL at 00:30

## 2024-02-04 RX ADMIN — OXYCODONE HYDROCHLORIDE 5 MILLIGRAM(S): 5 TABLET ORAL at 12:00

## 2024-02-04 RX ADMIN — OXYCODONE HYDROCHLORIDE 5 MILLIGRAM(S): 5 TABLET ORAL at 21:00

## 2024-02-04 RX ADMIN — HEPARIN SODIUM 5000 UNIT(S): 5000 INJECTION INTRAVENOUS; SUBCUTANEOUS at 23:23

## 2024-02-04 RX ADMIN — Medication 975 MILLIGRAM(S): at 12:00

## 2024-02-04 RX ADMIN — Medication 25 GRAM(S): at 09:57

## 2024-02-04 RX ADMIN — Medication 975 MILLIGRAM(S): at 20:07

## 2024-02-04 RX ADMIN — OXYCODONE HYDROCHLORIDE 5 MILLIGRAM(S): 5 TABLET ORAL at 20:07

## 2024-02-04 RX ADMIN — SODIUM CHLORIDE 100 MILLILITER(S): 9 INJECTION, SOLUTION INTRAVENOUS at 20:06

## 2024-02-04 RX ADMIN — HEPARIN SODIUM 5000 UNIT(S): 5000 INJECTION INTRAVENOUS; SUBCUTANEOUS at 15:17

## 2024-02-04 RX ADMIN — Medication 975 MILLIGRAM(S): at 05:12

## 2024-02-04 NOTE — PROGRESS NOTE ADULT - ASSESSMENT
1/30 s/p ystoscopy, Transurethral resection of large bladder tumor, Bilateral retrograde pyelograms, Bilateral ureteral catheterization.  Right ureteroscopy with dilation of the right ureteral stricture, Insertion of right ureteral stent and placement of left-sided low ureteral catheter  2/1 had removal of left ureteral stent  2/2 c/o pain left flank and continues to have pain although it has lessened    #Pierce  pt has morales with good UO, nonoliguric 1.8L in the past 24 hrs  cr haduptrended 1.1 to 1.7 from 2/1 to 2/2 to 1.8 on 2.2-2.3  today cr 1.78 and stable  renal sono 1/2 neg for hydro  creatinine has somewhat plateaued  seems like atn  cont IVF   recommend to repeat renal and bladder sono on 2/5 to reassess for hydro/ pt's symptoms  cont to monitor cr trend  avoid nsaids (received Celecoxib x 1)  #mild hyperkalemia  repeat k stable

## 2024-02-04 NOTE — PROGRESS NOTE ADULT - SUBJECTIVE AND OBJECTIVE BOX
[facility] Daily Resident Progress Note    OVERNIGHT:  No acute events.     SUBJECTIVE: Pt seen and examined at bedside on morning rounds.     OBJECTIVE:  Vital Signs Last 24 Hrs  T(C): 36.7 (04 Feb 2024 09:26), Max: 37.5 (04 Feb 2024 04:33)  T(F): 98.1 (04 Feb 2024 09:26), Max: 99.5 (04 Feb 2024 04:33)  HR: 63 (04 Feb 2024 09:26) (63 - 77)  BP: 153/82 (04 Feb 2024 09:26) (147/83 - 155/78)  BP(mean): --  RR: 18 (04 Feb 2024 09:26) (18 - 18)  SpO2: 96% (04 Feb 2024 09:26) (95% - 99%)    Parameters below as of 04 Feb 2024 09:26  Patient On (Oxygen Delivery Method): room air      Physical Exam:  Gen: NAD, A&Ox3  Pulm: No respiratory distress, no subcostal retractions  CV: RRR, no JVD  Abd: Soft, mildly distended, appropriately tender, dressings c/d/i, carlos in place  Extremities:  FROM, warm and well perfused, equal bilateral muscle strength  : morales, dressing in place      dextrose 5% + lactated ringers. milliLiter(s) (100 mL/Hr)      Medications:  MEDICATIONS  (STANDING):  acetaminophen     Tablet .. 975 milliGRAM(s) Oral every 6 hours  dextrose 5% + lactated ringers. 1000 milliLiter(s) (100 mL/Hr) IV Continuous <Continuous>  heparin   Injectable 5000 Unit(s) SubCutaneous every 8 hours  influenza   Vaccine 0.5 milliLiter(s) IntraMuscular once    MEDICATIONS  (PRN):  oxyCODONE    IR 5 milliGRAM(s) Oral every 4 hours PRN Severe Pain (7 - 10)  oxyCODONE    IR 2.5 milliGRAM(s) Oral every 4 hours PRN Moderate Pain (4 - 6)    Allergies:  No Known Drug Allergies  buckwheat (Anaphylaxis; Angioedema)      Labs:  02-04    139  |  102  |  12  ----------------------------<  102<H>  3.9   |  25  |  1.78<H>    Ca    9.1      04 Feb 2024 07:04  Phos  3.3     02-04  Mg     1.8     02-04                            11.0   9.28  )-----------( 374      ( 04 Feb 2024 07:07 )             33.9

## 2024-02-04 NOTE — PROGRESS NOTE ADULT - ASSESSMENT
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     Plan  - ERP  - Elevated Cr > US renal unremarkable, Cr today 1.7, f/u Urology recs  - No toradol  - Diet: LRD   - DVT ppx: SQH  - SOPHIE remove on DC  - Morales > maintain for 2 weeks, management per Urology  - Needs f/u with Dr. Malcolm in 2 weeks for morales and stent removal in the office    Red Surgery  [id number]

## 2024-02-04 NOTE — PROGRESS NOTE ADULT - SUBJECTIVE AND OBJECTIVE BOX
[facility] DIVISION OF KIDNEY DISEASES AND HYPERTENSION -- FOLLOW UP NOTE  --------------------------------------------------------------------------------  Chief Complaint:/subjective:  pt c/o left flank pain and some radiation down to bladder today    24 hour events:  no acute events  cr downtrending      PAST HISTORY  --------------------------------------------------------------------------------  No significant changes to PMH, PSH, FHx, SHx, unless otherwise noted    ALLERGIES & MEDICATIONS  --------------------------------------------------------------------------------  Allergies    No Known Drug Allergies  buckwheat (Anaphylaxis; Angioedema)    Intolerances      Standing Inpatient Medications  acetaminophen     Tablet .. 975 milliGRAM(s) Oral every 6 hours  dextrose 5% + lactated ringers. 1000 milliLiter(s) IV Continuous <Continuous>  heparin   Injectable 5000 Unit(s) SubCutaneous every 8 hours  influenza   Vaccine 0.5 milliLiter(s) IntraMuscular once    PRN Inpatient Medications  oxyCODONE    IR 2.5 milliGRAM(s) Oral every 4 hours PRN  oxyCODONE    IR 5 milliGRAM(s) Oral every 4 hours PRN      REVIEW OF SYSTEMS  --------------------------------------------------------------------------------  Gen: No weight changes, fatigue, fevers/chills, weakness  Skin: No rashes  Head/Eyes/Ears/Mouth: No headache;   Respiratory: No dyspnea, cough  CV: No chest pain, PND, orthopnea  GI: No abdominal pain, diarrhea, constipation, nausea, vomiting  : No increased frequency, dysuria, hematuria, nocturia  MSK: No joint pain/swelling; no back pain; no edema  Neuro: No dizziness/lightheadedness, weakness  Heme: No easy bruising or bleeding  Psych: No significant nervousness, anxiety, stress, depression    All other systems were reviewed and are negative, except as noted.    VITALS/PHYSICAL EXAM  --------------------------------------------------------------------------------  T(C): 36.7 (02-04-24 @ 09:26), Max: 37.5 (02-04-24 @ 04:33)  HR: 63 (02-04-24 @ 09:26) (63 - 77)  BP: 153/82 (02-04-24 @ 09:26) (147/83 - 155/78)  RR: 18 (02-04-24 @ 09:26) (18 - 18)  SpO2: 96% (02-04-24 @ 09:26) (95% - 99%)  Wt(kg): --  Adult Advanced Hemodynamics Last 24 Hrs  ABP: --  ABP(mean): --  CVP(mm Hg): --  CO: --  CI: --  PA: --  PA(mean): --  PCWP: --  SVR: --  SVRI: --        02-03-24 @ 07:01  -  02-04-24 @ 07:00  --------------------------------------------------------  IN: 2100 mL / OUT: 1825 mL / NET: 275 mL      Physical Exam:  	Gen: NAD,    	HEENT:   no jvp  	Pulm: CTA B/L  	CV: RRR, S1S2; no rub  	Back:  no sacral edema  	Abd: +BS, soft,    	: No suprapubic tenderness  	Ext: no edema  	Neuro: awake  	Psych:alert  	Skin: Warm     LABS/STUDIES  --------------------------------------------------------------------------------              11.0   9.28  >-----------<  374      [02-04-24 @ 07:07]              33.9     Hemoglobin: 11.0 g/dL (02-04-24 @ 07:07)  Hemoglobin: 12.3 g/dL (02-03-24 @ 06:37)    Platelet Count - Automated: 374 K/uL (02-04-24 @ 07:07)  Platelet Count - Automated: 390 K/uL (02-03-24 @ 06:37)    139  |  102  |  12  ----------------------------<  102      [02-04-24 @ 07:04]  3.9   |  25  |  1.78        Ca     9.1     [02-04-24 @ 07:04]      Mg     1.8     [02-04-24 @ 07:04]      Phos  3.3     [02-04-24 @ 07:04]            Creatinine Trend:  SCr 1.78 [02-04 @ 07:04]  SCr 1.87 [02-03 @ 06:30]  SCr 1.81 [02-02 @ 16:12]  SCr 1.71 [02-02 @ 07:09]  SCr 1.17 [02-01 @ 07:18]    Urinalysis - [02-04-24 @ 07:04]      Color  / Appearance  / SG  / pH       Gluc 102 / Ketone   / Bili  / Urobili        Blood  / Protein  / Leuk Est  / Nitrite       RBC  / WBC  / Hyaline  / Gran  / Sq Epi  / Non Sq Epi  / Bacteria     Urine Creatinine 195      [02-03-24 @ 18:07]  Urine Sodium 147      [02-03-24 @ 18:07]

## 2024-02-04 NOTE — CHART NOTE - NSCHARTNOTEFT_GEN_A_CORE
Called by primary team for L flank pain. Pt seen and examined at bedside. States has chronic mildly left flank pain, worsen about 20 mins ago, rate 8/10. But states just had IV oxycodone and the pain is getting less, rate 7/10. Paris in place with dark pink color urine. Physical exam, no CVAT bilaterally, no tender on left flank, non-palpable bladder.     - continue pain control as needed  - f/u RBUS tomorrow Called by primary team for L flank pain. Pt seen and examined at bedside. States has chronic mildly left flank pain, worsen about 20 mins ago, rate 8/10. But states just had IV oxycodone and the pain is getting less, rate 7/10. Denies fever, chills, sob, chest pain, nausea, vomiting. Paris in place with dark pink color urine. Physical exam, no CVAT bilaterally, no tender on left flank, non-palpable bladder.     - continue pain control as needed  - f/u RBUS tomorrow

## 2024-02-05 ENCOUNTER — TRANSCRIPTION ENCOUNTER (OUTPATIENT)
Age: 59
End: 2024-02-05

## 2024-02-05 VITALS
HEART RATE: 69 BPM | SYSTOLIC BLOOD PRESSURE: 152 MMHG | DIASTOLIC BLOOD PRESSURE: 74 MMHG | OXYGEN SATURATION: 97 % | RESPIRATION RATE: 18 BRPM | TEMPERATURE: 99 F

## 2024-02-05 LAB
ANION GAP SERPL CALC-SCNC: 9 MMOL/L — SIGNIFICANT CHANGE UP (ref 5–17)
BUN SERPL-MCNC: 13 MG/DL — SIGNIFICANT CHANGE UP (ref 7–23)
CALCIUM SERPL-MCNC: 8.6 MG/DL — SIGNIFICANT CHANGE UP (ref 8.4–10.5)
CHLORIDE SERPL-SCNC: 104 MMOL/L — SIGNIFICANT CHANGE UP (ref 96–108)
CO2 SERPL-SCNC: 27 MMOL/L — SIGNIFICANT CHANGE UP (ref 22–31)
CREAT SERPL-MCNC: 1.52 MG/DL — HIGH (ref 0.5–1.3)
EGFR: 52 ML/MIN/1.73M2 — LOW
GLUCOSE SERPL-MCNC: 100 MG/DL — HIGH (ref 70–99)
HCT VFR BLD CALC: 31.1 % — LOW (ref 39–50)
HGB BLD-MCNC: 9.8 G/DL — LOW (ref 13–17)
MAGNESIUM SERPL-MCNC: 2.2 MG/DL — SIGNIFICANT CHANGE UP (ref 1.6–2.6)
MCHC RBC-ENTMCNC: 28.2 PG — SIGNIFICANT CHANGE UP (ref 27–34)
MCHC RBC-ENTMCNC: 31.5 GM/DL — LOW (ref 32–36)
MCV RBC AUTO: 89.4 FL — SIGNIFICANT CHANGE UP (ref 80–100)
NRBC # BLD: 0 /100 WBCS — SIGNIFICANT CHANGE UP (ref 0–0)
PHOSPHATE SERPL-MCNC: 3.5 MG/DL — SIGNIFICANT CHANGE UP (ref 2.5–4.5)
PLATELET # BLD AUTO: 346 K/UL — SIGNIFICANT CHANGE UP (ref 150–400)
POTASSIUM SERPL-MCNC: 4.7 MMOL/L — SIGNIFICANT CHANGE UP (ref 3.5–5.3)
POTASSIUM SERPL-SCNC: 4.7 MMOL/L — SIGNIFICANT CHANGE UP (ref 3.5–5.3)
RBC # BLD: 3.48 M/UL — LOW (ref 4.2–5.8)
RBC # FLD: 13.1 % — SIGNIFICANT CHANGE UP (ref 10.3–14.5)
SODIUM SERPL-SCNC: 140 MMOL/L — SIGNIFICANT CHANGE UP (ref 135–145)
WBC # BLD: 8.83 K/UL — SIGNIFICANT CHANGE UP (ref 3.8–10.5)
WBC # FLD AUTO: 8.83 K/UL — SIGNIFICANT CHANGE UP (ref 3.8–10.5)

## 2024-02-05 PROCEDURE — 84300 ASSAY OF URINE SODIUM: CPT

## 2024-02-05 PROCEDURE — 76000 FLUOROSCOPY <1 HR PHYS/QHP: CPT

## 2024-02-05 PROCEDURE — 76770 US EXAM ABDO BACK WALL COMP: CPT

## 2024-02-05 PROCEDURE — C1894: CPT

## 2024-02-05 PROCEDURE — 83735 ASSAY OF MAGNESIUM: CPT

## 2024-02-05 PROCEDURE — 82570 ASSAY OF URINE CREATININE: CPT

## 2024-02-05 PROCEDURE — 88341 IMHCHEM/IMCYTCHM EA ADD ANTB: CPT

## 2024-02-05 PROCEDURE — C2625: CPT

## 2024-02-05 PROCEDURE — 85027 COMPLETE CBC AUTOMATED: CPT

## 2024-02-05 PROCEDURE — 88309 TISSUE EXAM BY PATHOLOGIST: CPT

## 2024-02-05 PROCEDURE — C1769: CPT

## 2024-02-05 PROCEDURE — C1726: CPT

## 2024-02-05 PROCEDURE — 80048 BASIC METABOLIC PNL TOTAL CA: CPT

## 2024-02-05 PROCEDURE — C9399: CPT

## 2024-02-05 PROCEDURE — C2617: CPT

## 2024-02-05 PROCEDURE — C1758: CPT

## 2024-02-05 PROCEDURE — 84100 ASSAY OF PHOSPHORUS: CPT

## 2024-02-05 PROCEDURE — 88305 TISSUE EXAM BY PATHOLOGIST: CPT

## 2024-02-05 PROCEDURE — 97530 THERAPEUTIC ACTIVITIES: CPT

## 2024-02-05 PROCEDURE — 97116 GAIT TRAINING THERAPY: CPT

## 2024-02-05 PROCEDURE — 36415 COLL VENOUS BLD VENIPUNCTURE: CPT

## 2024-02-05 PROCEDURE — 97161 PT EVAL LOW COMPLEX 20 MIN: CPT

## 2024-02-05 PROCEDURE — C1889: CPT

## 2024-02-05 PROCEDURE — 82962 GLUCOSE BLOOD TEST: CPT

## 2024-02-05 NOTE — DISCHARGE NOTE NURSING/CASE MANAGEMENT/SOCIAL WORK - NSDCPEFALRISK_GEN_ALL_CORE
For information on Fall & Injury Prevention, visit: [email or web address] OR  [email or web address] OR  https://www.cdc.gov/steadi/patient.html

## 2024-02-05 NOTE — PROVIDER CONTACT NOTE (OTHER) - ACTION/TREATMENT ORDERED:
MD aware. No interventions at this time. Continue to educate patient on medications importance.
Vitals taken, all stable. No new orders at this time
provider notified & aware, provider notified urology team & will assess pt at bedside

## 2024-02-05 NOTE — PROVIDER CONTACT NOTE (OTHER) - RECOMMENDATIONS
notified provider & medicate pt with PRN pain
MD aware. Pt educated on medications importance.
MD made aware

## 2024-02-05 NOTE — PROVIDER CONTACT NOTE (OTHER) - BACKGROUND
s/p SONA & cystoscopy
s/p SONA & cystoscopy
Pt s/p Lap LAR & transurethral resection of bladder tumor

## 2024-02-05 NOTE — DISCHARGE NOTE NURSING/CASE MANAGEMENT/SOCIAL WORK - PATIENT PORTAL LINK FT
You can access the FollowMyHealth Patient Portal offered by [facility] by registering at the following website: [email or web address]. By joining [facility]’s FollowMyHealth portal, you will also be able to view your health information using other applications (apps) compatible with our system.

## 2024-02-05 NOTE — PROVIDER CONTACT NOTE (OTHER) - SITUATION
Pt refusing Tylenol and Heparin
Pt had 1 dark red boody BM
pt c/o L flank spasms 2/10 pain & small leakage from tip of penis where morales is inserted

## 2024-02-05 NOTE — PROGRESS NOTE ADULT - ASSESSMENT
58y Male s/p Cystoscopy, TURBT, right ureteral stent placement, left ureteral catheter placement prior to laparoscopic hand assisted anterior resection; left ureteral catheter was removed 2/1    Plan:  - Cr stable over the weekend, Cr pending this morning  - RBUS showing no hydro and patient making good urine  - F/u repeat RBUS today  - F/u nephrology  - Possible discharge home pending Cr and RBUS findings  - Needs f/u with Dr. Malcolm in 2 weeks for morales and R stent removal in the office  - Remainder of care per primary team  - Discussed with Dr. Malcolm    [facility]  [address] [place] [phone]

## 2024-02-05 NOTE — PROGRESS NOTE ADULT - PROVIDER SPECIALTY LIST ADULT
Colorectal Surgery
Nephrology
Surgery
Urology
Anesthesia
Anesthesia
Surgery
Urology
Surgery

## 2024-02-05 NOTE — PROGRESS NOTE ADULT - ASSESSMENT
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     Plan  - ERP  - Cr downtrending, f/u today's labs, US kidney/bladder per Urology  - No toradol  - Diet: LRD   - DVT ppx: SQH  - Morales > maintain for 2 weeks, management per Urology  - Needs f/u with Dr. Malcolm in 2 weeks for morales and stent removal in the office    [facility]  [id number]

## 2024-02-05 NOTE — PROVIDER CONTACT NOTE (OTHER) - ASSESSMENT
Pt denies SOB, dizziness, lightheadedness, pain, chest pain, palpitations, headache. All VSS. Pt states he has done "13 laps today" and that he "does not want any medications."
Pt A+0x4, no s/s of distress. Pt went to have BM and notified RN of blood in the stool. Pt denies pain at this time. Pt is also passing gas
pt c/o L flank spasms 2/10 pain & small leakage from tip of penis where morales is inserted. VS WNL, no blood/signs of bleeding or hematuria. Morales WDL.

## 2024-02-05 NOTE — PROVIDER CONTACT NOTE (MEDICATION) - ASSESSMENT
pt refused 0200 AM & 0600 AM tylenol and 0600 heparin. VS WNL on room air. No signs/sxs of bleeding or discomfort. Pt refuses meds, states he "feels amazing" and is going home today and does not need pain meds or a blood thinner

## 2024-02-05 NOTE — PROGRESS NOTE ADULT - SUBJECTIVE AND OBJECTIVE BOX
Subjective  Patient seen and examined. States that left flank pain improved this morning.     Objective    Vital signs  T(F): , Max: 98.7 (02-05-24 @ 00:22)  HR: 61 (02-05-24 @ 04:55)  BP: 152/75 (02-05-24 @ 04:55)  SpO2: 97% (02-05-24 @ 04:55)  Wt(kg): --    Output     OUT:    Indwelling Catheter - Urethral (mL): 2650 mL  Total OUT: 2650 mL    Total NET: -2650 mL    Gen: NAD  Abd: soft, nontender, nondistended  : morales secured in place, draining CYU. No CVA tenderness.    Labs      02-04 @ 07:07    WBC 9.28  / Hct 33.9  / SCr --       02-04 @ 07:04    WBC --    / Hct --    / SCr 1.78

## 2024-02-05 NOTE — PROGRESS NOTE ADULT - SUBJECTIVE AND OBJECTIVE BOX
SURGERY DAILY PROGRESS NOTE    SUBJECTIVE: Patient seen and evaluated on AM rounds. Doing well. Tolerating diet. Having BM and passing flatus. No N/V.     Overnight Events:  No acute events overnight  -----------------------------------------------------------------------------------------------------------------------------------------------------------------------------------------------------------  OBJECTIVE:  Vital Signs Last 24 Hrs  T(C): 37 (05 Feb 2024 04:55), Max: 37.1 (05 Feb 2024 00:22)  T(F): 98.6 (05 Feb 2024 04:55), Max: 98.7 (05 Feb 2024 00:22)  HR: 61 (05 Feb 2024 04:55) (61 - 73)  BP: 152/75 (05 Feb 2024 04:55) (138/68 - 158/81)  BP(mean): --  RR: 18 (05 Feb 2024 04:55) (18 - 18)  SpO2: 97% (05 Feb 2024 04:55) (95% - 97%)    Parameters below as of 05 Feb 2024 04:55  Patient On (Oxygen Delivery Method): room air      I&O's Detail    04 Feb 2024 07:01  -  05 Feb 2024 07:00  --------------------------------------------------------  IN:    dextrose 5% + lactated ringers: 1200 mL    Oral Fluid: 720 mL  Total IN: 1920 mL    OUT:    Indwelling Catheter - Urethral (mL): 2650 mL  Total OUT: 2650 mL    Total NET: -730 mL        Daily     Daily   MEDICATIONS  (STANDING):  acetaminophen     Tablet .. 975 milliGRAM(s) Oral every 6 hours  dextrose 5% + lactated ringers. 1000 milliLiter(s) (100 mL/Hr) IV Continuous <Continuous>  heparin   Injectable 5000 Unit(s) SubCutaneous every 8 hours  influenza   Vaccine 0.5 milliLiter(s) IntraMuscular once    MEDICATIONS  (PRN):  oxyCODONE    IR 5 milliGRAM(s) Oral every 4 hours PRN Severe Pain (7 - 10)  oxyCODONE    IR 2.5 milliGRAM(s) Oral every 4 hours PRN Moderate Pain (4 - 6)      LABS:                        11.0   9.28  )-----------( 374      ( 04 Feb 2024 07:07 )             33.9     02-04    139  |  102  |  12  ----------------------------<  102<H>  3.9   |  25  |  1.78<H>    Ca    9.1      04 Feb 2024 07:04  Phos  3.3     02-04  Mg     1.8     02-04        Urinalysis Basic - ( 04 Feb 2024 07:04 )    Color: x / Appearance: x / SG: x / pH: x  Gluc: 102 mg/dL / Ketone: x  / Bili: x / Urobili: x   Blood: x / Protein: x / Nitrite: x   Leuk Esterase: x / RBC: x / WBC x   Sq Epi: x / Non Sq Epi: x / Bacteria: x        Physical Exam:  Gen: NAD, A&Ox3  Pulm: No respiratory distress, no subcostal retractions  CV: RRR, no JVD  Abd: Soft, non distended, appropriately tender, dressings c/d/i.   Extremities:  FROM, warm and well perfused, equal bilateral muscle strength  : morales, dressing in place

## 2024-02-06 ENCOUNTER — NON-APPOINTMENT (OUTPATIENT)
Age: 59
End: 2024-02-06

## 2024-02-07 ENCOUNTER — NON-APPOINTMENT (OUTPATIENT)
Age: 59
End: 2024-02-07

## 2024-02-08 ENCOUNTER — NON-APPOINTMENT (OUTPATIENT)
Age: 59
End: 2024-02-08

## 2024-02-08 LAB — SURGICAL PATHOLOGY STUDY: SIGNIFICANT CHANGE UP

## 2024-02-12 ENCOUNTER — NON-APPOINTMENT (OUTPATIENT)
Age: 59
End: 2024-02-12

## 2024-02-14 ENCOUNTER — APPOINTMENT (OUTPATIENT)
Dept: UROLOGY | Facility: CLINIC | Age: 59
End: 2024-02-14
Payer: COMMERCIAL

## 2024-02-14 ENCOUNTER — OUTPATIENT (OUTPATIENT)
Dept: OUTPATIENT SERVICES | Facility: HOSPITAL | Age: 59
LOS: 1 days | End: 2024-02-14
Payer: COMMERCIAL

## 2024-02-14 ENCOUNTER — APPOINTMENT (OUTPATIENT)
Dept: COLORECTAL SURGERY | Facility: CLINIC | Age: 59
End: 2024-02-14
Payer: COMMERCIAL

## 2024-02-14 DIAGNOSIS — N13.5 CROSSING VESSEL AND STRICTURE OF URETER W/OUT HYDRONEPHROSIS: ICD-10-CM

## 2024-02-14 DIAGNOSIS — R35.0 FREQUENCY OF MICTURITION: ICD-10-CM

## 2024-02-14 DIAGNOSIS — N32.89 OTHER SPECIFIED DISORDERS OF BLADDER: ICD-10-CM

## 2024-02-14 DIAGNOSIS — Z98.890 OTHER SPECIFIED POSTPROCEDURAL STATES: Chronic | ICD-10-CM

## 2024-02-14 PROCEDURE — 52310 CYSTOSCOPY AND TREATMENT: CPT

## 2024-02-14 PROCEDURE — 99214 OFFICE O/P EST MOD 30 MIN: CPT | Mod: 25

## 2024-02-14 PROCEDURE — 99024 POSTOP FOLLOW-UP VISIT: CPT

## 2024-02-19 PROBLEM — N32.89 BLADDER MASS: Noted: 2024-01-27

## 2024-02-19 PROBLEM — N13.5 URETER OBSTRUCTION: Status: ACTIVE | Noted: 2024-02-19

## 2024-02-19 PROBLEM — N13.5 URETERAL STRICTURE: Status: ACTIVE | Noted: 2024-02-19

## 2024-02-19 NOTE — HISTORY OF PRESENT ILLNESS
[FreeTextEntry1] : Patient is here for follow-up He underwent voiding trial followed by cystoscopy and removal of right ureteral stent We discussed the pathology from his bladder tumor resection as below  1.Right lateral wall bladder tumor, transurethral resection - Histologic type:    Invasive urothelial carcinoma - Histologic grade:    High grade - Pattern of growth of the non-invasive component:    Papillary - Local invasion:    Carcinoma superficially invades lamina propria - Muscularis propria:    Muscularis propria (detrusor muscle) is not identified -  Lymphovascular invasion:   Not identified  2. Base of bladder tumor, excision - Fragments of benign fibromuscular tissue with chronic inflammation, consistent with muscularis propria (detrusor muscle) - No malignancy identified

## 2024-02-19 NOTE — ASSESSMENT
[FreeTextEntry1] : Recommend intravesical BCG x 6 weekly bladder installations Risk benefits alternatives discussed We will try to get this done before he starts his chemotherapy treatments for his rectal cancer Plan to start in 2 weeks for the first installation  He will eventually need repeat TUR after completing his bladder treatments.  This may have to be deferred until he completes his chemotherapy for the rectal cancer

## 2024-02-20 DIAGNOSIS — N13.5 CROSSING VESSEL AND STRICTURE OF URETER WITHOUT HYDRONEPHROSIS: ICD-10-CM

## 2024-02-20 DIAGNOSIS — C67.8 MALIGNANT NEOPLASM OF OVERLAPPING SITES OF BLADDER: ICD-10-CM

## 2024-02-26 NOTE — PRE-OP CHECKLIST - INTERNAL PROSTHESES
Additional Notes: Patient has had both Covid injections Quality 134: Screening For Clinical Depression And Follow-Up Plan: The patient was screened for depression and the screen was negative and no follow up required Quality 431: Preventive Care And Screening: Unhealthy Alcohol Use - Screening: Patient not identified as an unhealthy alcohol user when screened for unhealthy alcohol use using a systematic screening method Quality 111:Pneumonia Vaccination Status For Older Adults: Patient received any pneumococcal conjugate or polysaccharide vaccine on or after their 60th birthday and before the end of the measurement period Quality 137: Melanoma: Continuity Of Care - Recall System: Documentation of system reason(s) for not entering patient's information into a recall system (eg, melanoma being monitored by another physician provider) Detail Level: Detailed Quality 226: Preventive Care And Screening: Tobacco Use: Screening And Cessation Intervention: Patient screened for tobacco use and is an ex/non-smoker Quality 138: Melanoma: Coordination Of Care: A treatment plan was communicated to the physicians providing continuing care within one month of diagnosis outlining: diagnosis, tumor thickness and a plan for surgery or alternate care. Quality 110: Preventive Care And Screening: Influenza Immunization: Influenza Immunization not Administered because Patient Refused. no

## 2024-02-28 ENCOUNTER — OUTPATIENT (OUTPATIENT)
Dept: OUTPATIENT SERVICES | Facility: HOSPITAL | Age: 59
LOS: 1 days | End: 2024-02-28
Payer: COMMERCIAL

## 2024-02-28 ENCOUNTER — APPOINTMENT (OUTPATIENT)
Dept: UROLOGY | Facility: CLINIC | Age: 59
End: 2024-02-28
Payer: MEDICAID

## 2024-02-28 VITALS
SYSTOLIC BLOOD PRESSURE: 167 MMHG | OXYGEN SATURATION: 99 % | RESPIRATION RATE: 14 BRPM | HEART RATE: 74 BPM | DIASTOLIC BLOOD PRESSURE: 90 MMHG

## 2024-02-28 DIAGNOSIS — R35.0 FREQUENCY OF MICTURITION: ICD-10-CM

## 2024-02-28 DIAGNOSIS — Z98.890 OTHER SPECIFIED POSTPROCEDURAL STATES: Chronic | ICD-10-CM

## 2024-02-28 PROCEDURE — 51720 TREATMENT OF BLADDER LESION: CPT

## 2024-02-28 RX ORDER — BACILLUS CALMETTE-GUERIN 50 MG/50ML
50 POWDER, FOR SUSPENSION INTRAVESICAL
Qty: 1 | Refills: 0 | Status: COMPLETED | OUTPATIENT
Start: 2024-02-27 | End: 2024-02-28

## 2024-02-28 RX ORDER — BACILLUS CALMETTE-GUERIN 50 MG/50ML
50 POWDER, FOR SUSPENSION INTRAVESICAL
Refills: 0 | Status: COMPLETED | OUTPATIENT
Start: 2024-02-28

## 2024-02-28 RX ADMIN — BACILLUS CALMETTE-GUERIN 0 MG: 50 POWDER, FOR SUSPENSION INTRAVESICAL at 00:00

## 2024-02-28 NOTE — HISTORY OF PRESENT ILLNESS
[FreeTextEntry1] : The patient is a pleasant 58-year-old gentleman who presents for his first postoperative visit.  Patient is approximately 2 weeks status post combined transurethral resection of a bladder tumor and an anterior resection.  He was found to have a bladder lesion and a known biopsy-proven adenocarcinoma of the colon.  The patient had a rapid and uneventful postoperative recuperation and was discharged home on postoperative day #2.  Final pathology on the bladder tumor revealed an element of a high-grade lesion but no muscular invasion.  This will be followed closely by urology.  The final pathology on the colon lesion revealed a T4 N2 lesion with 5 of 21 sampled lymph nodes being positive for micrometastatic disease.  Additionally perineural and perivascular invasion was seen with 2 tumor deposits in the pericolonic fat.  The patient just saw urology and had his catheter and indwelling double-J stent on the right removed.  He is having some spasms related to that.  He is otherwise doing well with no significant incisional pain.  He is tolerating diet and moving his bowels.  He denies rectal bleeding or temperature elevation.  Physical examination reveals a thin but healthy-appearing male.  His abdomen is totally soft and nontender.  His trocar sites and specimen extraction site are well-healed with no evidence of infection.  Surgical staples were removed.  Again the patient will follow-up closely with urology.  I told him that I need to see him in 4 weeks for sigmoidoscopy under sedation to assess his colorectal anastomosis.  I have referred him to medical oncology as he will certainly need adjuvant therapy.  Overall he has two rather high risk lesions.

## 2024-03-03 ENCOUNTER — NON-APPOINTMENT (OUTPATIENT)
Age: 59
End: 2024-03-03

## 2024-03-04 DIAGNOSIS — R39.9 UNSPECIFIED SYMPTOMS AND SIGNS INVOLVING THE GENITOURINARY SYSTEM: ICD-10-CM

## 2024-03-04 DIAGNOSIS — C67.8 MALIGNANT NEOPLASM OF OVERLAPPING SITES OF BLADDER: ICD-10-CM

## 2024-03-05 ENCOUNTER — OUTPATIENT (OUTPATIENT)
Dept: OUTPATIENT SERVICES | Facility: HOSPITAL | Age: 59
LOS: 1 days | Discharge: HOME | End: 2024-03-05
Payer: COMMERCIAL

## 2024-03-05 DIAGNOSIS — C18.9 MALIGNANT NEOPLASM OF COLON, UNSPECIFIED: ICD-10-CM

## 2024-03-05 DIAGNOSIS — Z98.890 OTHER SPECIFIED POSTPROCEDURAL STATES: Chronic | ICD-10-CM

## 2024-03-05 LAB
APPEARANCE: CLEAR
BACTERIA: NEGATIVE /HPF
BILIRUBIN URINE: NEGATIVE
BLOOD URINE: ABNORMAL
CAST: 0 /LPF
COLOR: YELLOW
EPITHELIAL CELLS: 0 /HPF
GLUCOSE QUALITATIVE U: NEGATIVE MG/DL
KETONES URINE: NEGATIVE MG/DL
LEUKOCYTE ESTERASE URINE: ABNORMAL
MICROSCOPIC-UA: NORMAL
NITRITE URINE: NEGATIVE
PH URINE: 6
PROTEIN URINE: NEGATIVE MG/DL
RED BLOOD CELLS URINE: 1 /HPF
SPECIFIC GRAVITY URINE: 1.01
UROBILINOGEN URINE: 0.2 MG/DL
WHITE BLOOD CELLS URINE: 7 /HPF

## 2024-03-06 ENCOUNTER — APPOINTMENT (OUTPATIENT)
Dept: UROLOGY | Facility: CLINIC | Age: 59
End: 2024-03-06
Payer: COMMERCIAL

## 2024-03-06 ENCOUNTER — OUTPATIENT (OUTPATIENT)
Dept: OUTPATIENT SERVICES | Facility: HOSPITAL | Age: 59
LOS: 1 days | End: 2024-03-06
Payer: COMMERCIAL

## 2024-03-06 DIAGNOSIS — R35.0 FREQUENCY OF MICTURITION: ICD-10-CM

## 2024-03-06 DIAGNOSIS — Z98.890 OTHER SPECIFIED POSTPROCEDURAL STATES: Chronic | ICD-10-CM

## 2024-03-06 LAB — BACTERIA UR CULT: NORMAL

## 2024-03-06 PROCEDURE — 51720 TREATMENT OF BLADDER LESION: CPT

## 2024-03-06 RX ORDER — BACILLUS CALMETTE-GUERIN 50 MG/50ML
50 POWDER, FOR SUSPENSION INTRAVESICAL
Refills: 0 | Status: COMPLETED | OUTPATIENT
Start: 2024-03-06

## 2024-03-06 RX ADMIN — BACILLUS CALMETTE-GUERIN 0 MG: 50 POWDER, FOR SUSPENSION INTRAVESICAL at 00:00

## 2024-03-11 DIAGNOSIS — C67.8 MALIGNANT NEOPLASM OF OVERLAPPING SITES OF BLADDER: ICD-10-CM

## 2024-03-12 ENCOUNTER — NON-APPOINTMENT (OUTPATIENT)
Age: 59
End: 2024-03-12

## 2024-03-12 ENCOUNTER — APPOINTMENT (OUTPATIENT)
Dept: HEMATOLOGY ONCOLOGY | Facility: CLINIC | Age: 59
End: 2024-03-12
Payer: COMMERCIAL

## 2024-03-12 ENCOUNTER — RESULT REVIEW (OUTPATIENT)
Age: 59
End: 2024-03-12

## 2024-03-12 VITALS
BODY MASS INDEX: 22.76 KG/M2 | HEIGHT: 66.54 IN | OXYGEN SATURATION: 99 % | DIASTOLIC BLOOD PRESSURE: 73 MMHG | HEART RATE: 74 BPM | TEMPERATURE: 97.6 F | SYSTOLIC BLOOD PRESSURE: 186 MMHG | RESPIRATION RATE: 14 BRPM | WEIGHT: 143.3 LBS

## 2024-03-12 DIAGNOSIS — Z87.898 PERSONAL HISTORY OF OTHER SPECIFIED CONDITIONS: ICD-10-CM

## 2024-03-12 LAB
BASOPHILS # BLD AUTO: 0.07 K/UL — SIGNIFICANT CHANGE UP (ref 0–0.2)
BASOPHILS NFR BLD AUTO: 0.9 % — SIGNIFICANT CHANGE UP (ref 0–2)
EOSINOPHIL # BLD AUTO: 0.14 K/UL — SIGNIFICANT CHANGE UP (ref 0–0.5)
EOSINOPHIL NFR BLD AUTO: 1.7 % — SIGNIFICANT CHANGE UP (ref 0–6)
HCT VFR BLD CALC: 42.3 % — SIGNIFICANT CHANGE UP (ref 39–50)
HGB BLD-MCNC: 13.7 G/DL — SIGNIFICANT CHANGE UP (ref 13–17)
IMM GRANULOCYTES NFR BLD AUTO: 0.4 % — SIGNIFICANT CHANGE UP (ref 0–0.9)
LYMPHOCYTES # BLD AUTO: 1.5 K/UL — SIGNIFICANT CHANGE UP (ref 1–3.3)
LYMPHOCYTES # BLD AUTO: 18.6 % — SIGNIFICANT CHANGE UP (ref 13–44)
MCHC RBC-ENTMCNC: 28 PG — SIGNIFICANT CHANGE UP (ref 27–34)
MCHC RBC-ENTMCNC: 32.4 G/DL — SIGNIFICANT CHANGE UP (ref 32–36)
MCV RBC AUTO: 86.3 FL — SIGNIFICANT CHANGE UP (ref 80–100)
MONOCYTES # BLD AUTO: 0.55 K/UL — SIGNIFICANT CHANGE UP (ref 0–0.9)
MONOCYTES NFR BLD AUTO: 6.8 % — SIGNIFICANT CHANGE UP (ref 2–14)
NEUTROPHILS # BLD AUTO: 5.77 K/UL — SIGNIFICANT CHANGE UP (ref 1.8–7.4)
NEUTROPHILS NFR BLD AUTO: 71.6 % — SIGNIFICANT CHANGE UP (ref 43–77)
NRBC # BLD: 0 /100 WBCS — SIGNIFICANT CHANGE UP (ref 0–0)
PLATELET # BLD AUTO: 455 K/UL — HIGH (ref 150–400)
RBC # BLD: 4.9 M/UL — SIGNIFICANT CHANGE UP (ref 4.2–5.8)
RBC # FLD: 13.1 % — SIGNIFICANT CHANGE UP (ref 10.3–14.5)
WBC # BLD: 8.06 K/UL — SIGNIFICANT CHANGE UP (ref 3.8–10.5)
WBC # FLD AUTO: 8.06 K/UL — SIGNIFICANT CHANGE UP (ref 3.8–10.5)

## 2024-03-12 PROCEDURE — 99417 PROLNG OP E/M EACH 15 MIN: CPT

## 2024-03-12 PROCEDURE — G2211 COMPLEX E/M VISIT ADD ON: CPT

## 2024-03-12 PROCEDURE — 99205 OFFICE O/P NEW HI 60 MIN: CPT

## 2024-03-12 RX ORDER — HYDROCORTISONE 2.5% 25 MG/G
2.5 CREAM TOPICAL
Qty: 1 | Refills: 3 | Status: DISCONTINUED | COMMUNITY
Start: 2024-02-11 | End: 2024-03-12

## 2024-03-12 RX ORDER — CIPROFLOXACIN HYDROCHLORIDE 500 MG/1
500 TABLET, FILM COATED ORAL
Qty: 2 | Refills: 0 | Status: DISCONTINUED | COMMUNITY
Start: 2024-01-19 | End: 2024-03-12

## 2024-03-12 RX ORDER — METRONIDAZOLE 500 MG/1
500 TABLET ORAL
Qty: 3 | Refills: 0 | Status: DISCONTINUED | COMMUNITY
Start: 2024-01-19 | End: 2024-03-12

## 2024-03-13 ENCOUNTER — APPOINTMENT (OUTPATIENT)
Dept: UROLOGY | Facility: CLINIC | Age: 59
End: 2024-03-13
Payer: COMMERCIAL

## 2024-03-13 ENCOUNTER — OUTPATIENT (OUTPATIENT)
Dept: OUTPATIENT SERVICES | Facility: HOSPITAL | Age: 59
LOS: 1 days | End: 2024-03-13
Payer: COMMERCIAL

## 2024-03-13 VITALS — SYSTOLIC BLOOD PRESSURE: 161 MMHG | DIASTOLIC BLOOD PRESSURE: 62 MMHG | OXYGEN SATURATION: 100 % | HEART RATE: 70 BPM

## 2024-03-13 DIAGNOSIS — Z98.890 OTHER SPECIFIED POSTPROCEDURAL STATES: Chronic | ICD-10-CM

## 2024-03-13 DIAGNOSIS — R35.0 FREQUENCY OF MICTURITION: ICD-10-CM

## 2024-03-13 LAB
ALBUMIN SERPL ELPH-MCNC: 4.6 G/DL
ALP BLD-CCNC: 69 U/L
ALT SERPL-CCNC: 15 U/L
ANION GAP SERPL CALC-SCNC: 13 MMOL/L
APTT BLD: 29 SEC
AST SERPL-CCNC: 19 U/L
BILIRUB SERPL-MCNC: 0.3 MG/DL
BUN SERPL-MCNC: 18 MG/DL
CALCIUM SERPL-MCNC: 10.3 MG/DL
CEA SERPL-MCNC: 1.1 NG/ML
CHLORIDE SERPL-SCNC: 101 MMOL/L
CO2 SERPL-SCNC: 26 MMOL/L
CREAT SERPL-MCNC: 1.12 MG/DL
EGFR: 75 ML/MIN/1.73M2
ESTIMATED AVERAGE GLUCOSE: 114 MG/DL
FERRITIN SERPL-MCNC: 36 NG/ML
GLUCOSE SERPL-MCNC: 96 MG/DL
HBA1C MFR BLD HPLC: 5.6 %
HBV SURFACE AB SER QL: NONREACTIVE
HBV SURFACE AG SER QL: NONREACTIVE
HCV AB SER QL: NONREACTIVE
HCV S/CO RATIO: 0.11 S/CO
INR PPP: 0.93 RATIO
MAGNESIUM SERPL-MCNC: 1.9 MG/DL
POTASSIUM SERPL-SCNC: 5.2 MMOL/L
PROT SERPL-MCNC: 7.5 G/DL
PT BLD: 10.5 SEC
SODIUM SERPL-SCNC: 140 MMOL/L

## 2024-03-13 PROCEDURE — 51720 TREATMENT OF BLADDER LESION: CPT

## 2024-03-13 RX ORDER — BACILLUS CALMETTE-GUERIN 50 MG/50ML
50 POWDER, FOR SUSPENSION INTRAVESICAL
Qty: 1 | Refills: 0 | Status: COMPLETED | OUTPATIENT
Start: 2024-02-28 | End: 2024-03-06

## 2024-03-13 RX ORDER — BACILLUS CALMETTE-GUERIN 50 MG/50ML
50 POWDER, FOR SUSPENSION INTRAVESICAL
Refills: 0 | Status: COMPLETED | OUTPATIENT
Start: 2024-03-13

## 2024-03-13 RX ADMIN — BACILLUS CALMETTE-GUERIN 0 MG: 50 POWDER, FOR SUSPENSION INTRAVESICAL at 00:00

## 2024-03-14 ENCOUNTER — NON-APPOINTMENT (OUTPATIENT)
Age: 59
End: 2024-03-14

## 2024-03-14 DIAGNOSIS — C67.8 MALIGNANT NEOPLASM OF OVERLAPPING SITES OF BLADDER: ICD-10-CM

## 2024-03-15 RX ORDER — LIDOCAINE AND PRILOCAINE 25; 25 MG/G; MG/G
2.5-2.5 CREAM TOPICAL
Qty: 1 | Refills: 3 | Status: ACTIVE | COMMUNITY
Start: 2024-03-15 | End: 1900-01-01

## 2024-03-18 ENCOUNTER — APPOINTMENT (OUTPATIENT)
Dept: COLORECTAL SURGERY | Facility: CLINIC | Age: 59
End: 2024-03-18
Payer: COMMERCIAL

## 2024-03-18 LAB
FULL GENE SEQUENCE RESULT: NORMAL
INTERPRETATION PGDFRB: NORMAL
Lab: NEGATIVE
REF LAB TEST METHOD: NORMAL
REVIEWED BY: NORMAL
TA REPEAT RESULT: NORMAL
TEST PERFORMANCE INFO SPEC: NORMAL

## 2024-03-18 PROCEDURE — 45330 DIAGNOSTIC SIGMOIDOSCOPY: CPT | Mod: 58

## 2024-03-20 ENCOUNTER — APPOINTMENT (OUTPATIENT)
Dept: UROLOGY | Facility: CLINIC | Age: 59
End: 2024-03-20
Payer: COMMERCIAL

## 2024-03-20 ENCOUNTER — OUTPATIENT (OUTPATIENT)
Dept: OUTPATIENT SERVICES | Facility: HOSPITAL | Age: 59
LOS: 1 days | End: 2024-03-20
Payer: COMMERCIAL

## 2024-03-20 VITALS — RESPIRATION RATE: 16 BRPM | HEART RATE: 70 BPM | SYSTOLIC BLOOD PRESSURE: 173 MMHG | DIASTOLIC BLOOD PRESSURE: 85 MMHG

## 2024-03-20 DIAGNOSIS — R35.0 FREQUENCY OF MICTURITION: ICD-10-CM

## 2024-03-20 PROCEDURE — 51720 TREATMENT OF BLADDER LESION: CPT

## 2024-03-20 RX ORDER — BACILLUS CALMETTE-GUERIN 50 MG/50ML
50 POWDER, FOR SUSPENSION INTRAVESICAL
Refills: 0 | Status: COMPLETED | OUTPATIENT
Start: 2024-03-20

## 2024-03-20 RX ADMIN — BACILLUS CALMETTE-GUERIN 0 MG: 50 POWDER, FOR SUSPENSION INTRAVESICAL at 00:00

## 2024-03-21 DIAGNOSIS — C67.8 MALIGNANT NEOPLASM OF OVERLAPPING SITES OF BLADDER: ICD-10-CM

## 2024-03-22 ENCOUNTER — RESULT REVIEW (OUTPATIENT)
Age: 59
End: 2024-03-22

## 2024-03-22 ENCOUNTER — OUTPATIENT (OUTPATIENT)
Dept: OUTPATIENT SERVICES | Facility: HOSPITAL | Age: 59
LOS: 1 days | End: 2024-03-22
Payer: COMMERCIAL

## 2024-03-22 ENCOUNTER — TRANSCRIPTION ENCOUNTER (OUTPATIENT)
Age: 59
End: 2024-03-22

## 2024-03-22 VITALS
DIASTOLIC BLOOD PRESSURE: 74 MMHG | SYSTOLIC BLOOD PRESSURE: 138 MMHG | OXYGEN SATURATION: 100 % | RESPIRATION RATE: 18 BRPM | HEIGHT: 67 IN | HEART RATE: 70 BPM | TEMPERATURE: 98 F | WEIGHT: 145.06 LBS

## 2024-03-22 VITALS
DIASTOLIC BLOOD PRESSURE: 73 MMHG | SYSTOLIC BLOOD PRESSURE: 127 MMHG | RESPIRATION RATE: 17 BRPM | OXYGEN SATURATION: 100 % | HEART RATE: 76 BPM

## 2024-03-22 DIAGNOSIS — Z98.890 OTHER SPECIFIED POSTPROCEDURAL STATES: Chronic | ICD-10-CM

## 2024-03-22 DIAGNOSIS — C18.9 MALIGNANT NEOPLASM OF COLON, UNSPECIFIED: ICD-10-CM

## 2024-03-22 LAB
DPYD GENOTYPE: NORMAL
DPYD PHENOTYPE: NORMAL
DPYD SPECIMEN: NORMAL
PATHOLOGY STUDY: NORMAL

## 2024-03-22 PROCEDURE — 76937 US GUIDE VASCULAR ACCESS: CPT | Mod: 26

## 2024-03-22 PROCEDURE — 36561 INSERT TUNNELED CV CATH: CPT

## 2024-03-22 PROCEDURE — 77001 FLUOROGUIDE FOR VEIN DEVICE: CPT

## 2024-03-22 PROCEDURE — 76937 US GUIDE VASCULAR ACCESS: CPT

## 2024-03-22 PROCEDURE — C1894: CPT

## 2024-03-22 PROCEDURE — 77001 FLUOROGUIDE FOR VEIN DEVICE: CPT | Mod: 26

## 2024-03-22 PROCEDURE — C1788: CPT

## 2024-03-22 PROCEDURE — C1769: CPT

## 2024-03-22 RX ORDER — ONDANSETRON 8 MG/1
4 TABLET, FILM COATED ORAL ONCE
Refills: 0 | Status: DISCONTINUED | OUTPATIENT
Start: 2024-03-22 | End: 2024-04-05

## 2024-03-22 RX ORDER — HYDROMORPHONE HYDROCHLORIDE 2 MG/ML
0.5 INJECTION INTRAMUSCULAR; INTRAVENOUS; SUBCUTANEOUS
Refills: 0 | Status: DISCONTINUED | OUTPATIENT
Start: 2024-03-22 | End: 2024-03-22

## 2024-03-22 RX ORDER — HYDROMORPHONE HYDROCHLORIDE 2 MG/ML
1 INJECTION INTRAMUSCULAR; INTRAVENOUS; SUBCUTANEOUS
Refills: 0 | Status: DISCONTINUED | OUTPATIENT
Start: 2024-03-22 | End: 2024-03-22

## 2024-03-22 NOTE — ASU PREOP CHECKLIST - BMI (KG/M2)
22.7 Topical Retinoid Pregnancy And Lactation Text: This medication is Pregnancy Category C. It is unknown if this medication is excreted in breast milk.

## 2024-03-22 NOTE — H&P ADULT - HISTORY OF PRESENT ILLNESS
Interventional Radiology    HPI: 59y Male with colon cancer presents for port placement.     Review of Systems:   Constitutional: No fever, weight loss, or fatigue  Neurological: No headaches, memory loss, loss of strength, numbness, or tremors  Respiratory: No cough, wheezing, chills or hemoptysis; No shortness of breath  Cardiovascular: No chest pain, palpitations, dizziness, or leg swelling  Gastrointestinal: No abdominal or epigastric pain. No nausea, vomiting, or hematemesis; No diarrhea or constipation. No melena or hematochezia.  Skin: No itching, burning, rashes, or lesions   Musculoskeletal: No joint pain or swelling; No muscle, back, or extremity pain    Allergies: No Known Drug Allergies    Medications (Abx/Cardiac/Anticoagulation/Blood Products)      Data:  170.2  65.8  T(C): 36.4  HR: 70  BP: 138/74  RR: 18  SpO2: 100%            Physical Exam  General: No acute distress, nontoxic, A&Ox3  Chest: Non labored breathing  Abdomen: Non-distended, non-tender, no preitoneal signs  Extremities: No swelling, warm. No pedal edema or calf tenderness notes.  Skin: No rashes or lesions    RADIOLOGY & ADDITIONAL TESTS:    Imaging Reviewed    Plan: 59y Male presents for port placement  -Risks/Benefits/alternatives explained with the patient and/or healthcare proxy and witnessed informed consent obtained.

## 2024-03-22 NOTE — ASU PATIENT PROFILE, ADULT - FALL HARM RISK - UNIVERSAL INTERVENTIONS
Bed in lowest position, wheels locked, appropriate side rails in place/Call bell, personal items and telephone in reach/Instruct patient to call for assistance before getting out of bed or chair/Non-slip footwear when patient is out of bed/[place] to call system/Physically safe environment - no spills, clutter or unnecessary equipment/Purposeful Proactive Rounding/Room/bathroom lighting operational, light cord in reach

## 2024-03-22 NOTE — ASU DISCHARGE PLAN (ADULT/PEDIATRIC) - NS MD DC FALL RISK RISK
For information on Fall & Injury Prevention, visit: [email or web address] OR  [email or web address] OR  https://www.cdc.gov/steadi/patient.html

## 2024-03-22 NOTE — ASU DISCHARGE PLAN (ADULT/PEDIATRIC) - NURSING INSTRUCTIONS
Please feel free to contact us at [phone] if any problems arise. After 6PM, Monday through Friday, on weekends and on holidays, please call [phone] and ask for the radiology resident on call to be paged.

## 2024-03-25 NOTE — REVIEW OF SYSTEMS
[Negative] : Respiratory [FreeTextEntry7] : + bloating, blood in stools, bowel changes [FreeTextEntry8] : + hematuria

## 2024-03-25 NOTE — ASSESSMENT
"Well  - 2 Months Old  PHYSICAL DEVELOPMENT  · Your 2-month-old has improved head control and can lift the head and neck when lying on his or her stomach and back. It is very important that you continue to support your baby's head and neck when lifting, holding, or laying him or her down.  · Your baby may:  ¨ Try to push up when lying on his or her stomach.  ¨ Turn from side to back purposefully.  ¨ Briefly (for 5-10 seconds) hold an object such as a rattle.  SOCIAL AND EMOTIONAL DEVELOPMENT  Your baby:  · Recognizes and shows pleasure interacting with parents and consistent caregivers.  · Can smile, respond to familiar voices, and look at you.  · Shows excitement (moves arms and legs, squeals, changes facial expression) when you start to lift, feed, or change him or her.  · May cry when bored to indicate that he or she wants to change activities.  COGNITIVE AND LANGUAGE DEVELOPMENT  Your baby:  · Can  and vocalize.  · Should turn toward a sound made at his or her ear level.  · May follow people and objects with his or her eyes.  · Can recognize people from a distance.  ENCOURAGING DEVELOPMENT  · Place your baby on his or her tummy for supervised periods during the day (\"tummy time\"). This prevents the development of a flat spot on the back of the head. It also helps muscle development.    · Hold, cuddle, and interact with your baby when he or she is calm or crying. Encourage his or her caregivers to do the same. This develops your baby's social skills and emotional attachment to his or her parents and caregivers.    · Read books daily to your baby. Choose books with interesting pictures, colors, and textures.  · Take your baby on walks or car rides outside of your home. Talk about people and objects that you see.  · Talk and play with your baby. Find brightly colored toys and objects that are safe for your 2-month-old.  RECOMMENDED IMMUNIZATIONS  · Hepatitis B vaccine--The second dose of hepatitis B " [FreeTextEntry1] : Mr. Tiwari is a 59 year old male presenting to the office for an initial consultation of colon CA to discuss adjuvant treatment. Accompanied by wife Rafaela.   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 He was found to have a bladder lesion and a known biopsy-proven adenocarcinoma of the colon. Final pathology on the bladder tumor revealed an element of a high-grade lesion but no muscular invasion.  The final pathology on the colon lesion revealed a T4 N2 lesion with 5 of 21 sampled lymph nodes being positive for micrometastatic disease. Additionally perineural and perivascular invasion was seen with 2 tumor deposits in the pericolonic fat. CT C/A/P: no evidence of metastasis  CEA 1/25/24: 1.5   Discussed given high risk disease and stage III would require adjuvant chemotherapy FOLFOX vs XELOX 6 months. We reviewed the schedule, and side effects. Information provided and consent signed. Will order DPD gene testing. Planned for mediport placement  Pending sigmoidoscopy for colorectal anastomosis on 3/18 by Dr. Alegre Blood work ordered today, Sent [facility] testing today, will follow up   Patient seen and examined with Dr. Anny Clark MD Heme/onc fellow PGy4  Time = 100 minutes vaccine should be obtained at age 1-2 months. The second dose should be obtained no earlier than 4 weeks after the first dose.    · Rotavirus vaccine--The first dose of a 2-dose or 3-dose series should be obtained no earlier than 6 weeks of age. Immunization should not be started for infants aged 15 weeks or older.    · Diphtheria and tetanus toxoids and acellular pertussis (DTaP) vaccine--The first dose of a 5-dose series should be obtained no earlier than 6 weeks of age.    · Haemophilus influenzae type b (Hib) vaccine--The first dose of a 2-dose series and booster dose or 3-dose series and booster dose should be obtained no earlier than 6 weeks of age.    · Pneumococcal conjugate (PCV13) vaccine--The first dose of a 4-dose series should be obtained no earlier than 6 weeks of age.    · Inactivated poliovirus vaccine--The first dose of a 4-dose series should be obtained no earlier than 6 weeks of age.    · Meningococcal conjugate vaccine--Infants who have certain high-risk conditions, are present during an outbreak, or are traveling to a country with a high rate of meningitis should obtain this vaccine. The vaccine should be obtained no earlier than 6 weeks of age.  TESTING  Your baby's health care provider may recommend testing based upon individual risk factors.   NUTRITION  · Breast milk, infant formula, or a combination of the two provides all the nutrients your baby needs for the first several months of life. Exclusive breastfeeding, if this is possible for you, is best for your baby. Talk to your lactation consultant or health care provider about your baby's nutrition needs.  · Most 2-month-olds feed every 3-4 hours during the day. Your baby may be waiting longer between feedings than before. He or she will still wake during the night to feed.   · Feed your baby when he or she seems hungry. Signs of hunger include placing hands in the mouth and muzzling against the mother's breasts. Your baby may start to  show signs that he or she wants more milk at the end of a feeding.  · Always hold your baby during feeding. Never prop the bottle against something during feeding.  · Burp your baby midway through a feeding and at the end of a feeding.  · Spitting up is common. Holding your baby upright for 1 hour after a feeding may help.  · When breastfeeding, vitamin D supplements are recommended for the mother and the baby. Babies who drink less than 32 oz (about 1 L) of formula each day also require a vitamin D supplement.   · When breastfeeding, ensure you maintain a well-balanced diet and be aware of what you eat and drink. Things can pass to your baby through the breast milk. Avoid alcohol, caffeine, and fish that are high in mercury.  · If you have a medical condition or take any medicines, ask your health care provider if it is okay to breastfeed.  ORAL HEALTH  · Clean your baby's gums with a soft cloth or piece of gauze once or twice a day. You do not need to use toothpaste.    · If your water supply does not contain fluoride, ask your health care provider if you should give your infant a fluoride supplement (supplements are often not recommended until after 6 months of age).  SKIN CARE  · Protect your baby from sun exposure by covering him or her with clothing, hats, blankets, umbrellas, or other coverings. Avoid taking your baby outdoors during peak sun hours. A sunburn can lead to more serious skin problems later in life.  · Sunscreens are not recommended for babies younger than 6 months.  SLEEP  · The safest way for your baby to sleep is on his or her back. Placing your baby on his or her back reduces the chance of sudden infant death syndrome (SIDS), or crib death.  · At this age most babies take several naps each day and sleep between 15-16 hours per day.    · Keep nap and bedtime routines consistent.    · Lay your baby down to sleep when he or she is drowsy but not completely asleep so he or she can learn to  self-soothe.    · All crib mobiles and decorations should be firmly fastened. They should not have any removable parts.    · Keep soft objects or loose bedding, such as pillows, bumper pads, blankets, or stuffed animals, out of the crib or bassinet. Objects in a crib or bassinet can make it difficult for your baby to breathe.    · Use a firm, tight-fitting mattress. Never use a water bed, couch, or bean bag as a sleeping place for your baby. These furniture pieces can block your baby's breathing passages, causing him or her to suffocate.  · Do not allow your baby to share a bed with adults or other children.  SAFETY  · Create a safe environment for your baby.    ¨ Set your home water heater at 120°F (49°C).    ¨ Provide a tobacco-free and drug-free environment.    ¨ Equip your home with smoke detectors and change their batteries regularly.    ¨ Keep all medicines, poisons, chemicals, and cleaning products capped and out of the reach of your baby.    · Do not leave your baby unattended on an elevated surface (such as a bed, couch, or counter). Your baby could fall.    · When driving, always keep your baby restrained in a car seat. Use a rear-facing car seat until your child is at least 2 years old or reaches the upper weight or height limit of the seat. The car seat should be in the middle of the back seat of your vehicle. It should never be placed in the front seat of a vehicle with front-seat air bags.    · Be careful when handling liquids and sharp objects around your baby.    · Supervise your baby at all times, including during bath time. Do not expect older children to supervise your baby.    · Be careful when handling your baby when wet. Your baby is more likely to slip from your hands.    · Know the number for poison control in your area and keep it by the phone or on your refrigerator.  WHEN TO GET HELP  · Talk to your health care provider if you will be returning to work and need guidance regarding pumping  and storing breast milk or finding suitable .  · Call your health care provider if your baby shows any signs of illness, has a fever, or develops jaundice.    WHAT'S NEXT?  Your next visit should be when your baby is 4 months old.     This information is not intended to replace advice given to you by your health care provider. Make sure you discuss any questions you have with your health care provider.     Document Released: 01/07/2008 Document Revised: 05/03/2016 Document Reviewed: 08/27/2014  [employer] Patient Education ©2016 [employer]

## 2024-03-25 NOTE — HISTORY OF PRESENT ILLNESS
[Disease: _____________________] : Disease: [unfilled] [T: ___] : T[unfilled] [N: ___] : N[unfilled] [M: ___] : M[unfilled] [de-identified] : Mr. Tiwari is a 59 year old male presenting to the office for an initial consultation of colon CA to discuss adjuvant treatment.   Had onset of a change in bowel habits (ribbon like), rectal bleeding ~ one month ago. Seen by GI. Jan 9, 2024 underwent initial screening colonoscopy revealing a malignant appearing lesion of the distal sigmoid the bottom edge of which was 15 cm from the anal verge which was biopsied. The colonoscope could not traverse the lesion. He underwent transurethral resection of a bladder tumor and an anterior resection. He was found to have a bladder lesion and a known biopsy-proven adenocarcinoma of the colon. Final pathology on the bladder tumor revealed an element of a high-grade lesion but no muscular invasion.   The final pathology on the colon lesion revealed a T4 N2 lesion with 5 of 21 sampled lymph nodes being positive for micrometastatic disease.  Additionally perineural and perivascular invasion was seen with 2 tumor deposits in the pericolonic fat.  Seen urology and had his catheter and indwelling double-J stent on the right removed. He is having some spasms related to that. He is otherwise doing well with no significant incisional pain. He is tolerating diet and moving his bowels. He denies rectal bleeding or temperature elevation.   Pending sigmoidoscopy under sedation to assess his colorectal anastomosis by Dr. Alegre   PMHx: None prior, recent diagnosis ight lateral wall bladder tumor, transurethral resection Invasive urothelial carcinoma PSx: Hernia repair FHx:No family history of blood or cancer  Social Hx: Social etoh, denies tobacco or illicit drug use. Lives at home with wife dudley and 2 children (Danni - Daughter 25, Mate - son 23). 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

## 2024-03-26 RX ORDER — PROCHLORPERAZINE MALEATE 10 MG/1
10 TABLET ORAL EVERY 6 HOURS
Qty: 90 | Refills: 2 | Status: ACTIVE | COMMUNITY
Start: 2024-03-26 | End: 1900-01-01

## 2024-03-27 ENCOUNTER — APPOINTMENT (OUTPATIENT)
Dept: UROLOGY | Facility: CLINIC | Age: 59
End: 2024-03-27
Payer: COMMERCIAL

## 2024-03-27 ENCOUNTER — OUTPATIENT (OUTPATIENT)
Dept: OUTPATIENT SERVICES | Facility: HOSPITAL | Age: 59
LOS: 1 days | End: 2024-03-27
Payer: COMMERCIAL

## 2024-03-27 DIAGNOSIS — R35.0 FREQUENCY OF MICTURITION: ICD-10-CM

## 2024-03-27 DIAGNOSIS — Z98.890 OTHER SPECIFIED POSTPROCEDURAL STATES: Chronic | ICD-10-CM

## 2024-03-27 PROCEDURE — 51720 TREATMENT OF BLADDER LESION: CPT

## 2024-03-27 RX ORDER — BACILLUS CALMETTE-GUERIN 50 MG/50ML
50 POWDER, FOR SUSPENSION INTRAVESICAL
Refills: 0 | Status: COMPLETED | OUTPATIENT
Start: 2024-03-27

## 2024-03-27 RX ADMIN — BACILLUS CALMETTE-GUERIN 0 MG: 50 POWDER, FOR SUSPENSION INTRAVESICAL at 00:00

## 2024-03-28 DIAGNOSIS — C67.8 MALIGNANT NEOPLASM OF OVERLAPPING SITES OF BLADDER: ICD-10-CM

## 2024-04-01 ENCOUNTER — NON-APPOINTMENT (OUTPATIENT)
Age: 59
End: 2024-04-01

## 2024-04-01 ENCOUNTER — RESULT REVIEW (OUTPATIENT)
Age: 59
End: 2024-04-01

## 2024-04-01 ENCOUNTER — APPOINTMENT (OUTPATIENT)
Dept: INFUSION THERAPY | Facility: HOSPITAL | Age: 59
End: 2024-04-01

## 2024-04-01 ENCOUNTER — APPOINTMENT (OUTPATIENT)
Dept: HEMATOLOGY ONCOLOGY | Facility: CLINIC | Age: 59
End: 2024-04-01

## 2024-04-01 LAB
ALBUMIN SERPL ELPH-MCNC: 4.5 G/DL — SIGNIFICANT CHANGE UP (ref 3.3–5)
ALP SERPL-CCNC: 64 U/L — SIGNIFICANT CHANGE UP (ref 40–120)
ALT FLD-CCNC: 18 U/L — SIGNIFICANT CHANGE UP (ref 10–45)
ANION GAP SERPL CALC-SCNC: 10 MMOL/L — SIGNIFICANT CHANGE UP (ref 5–17)
AST SERPL-CCNC: 30 U/L — SIGNIFICANT CHANGE UP (ref 10–40)
BASOPHILS # BLD AUTO: 0.11 K/UL — SIGNIFICANT CHANGE UP (ref 0–0.2)
BASOPHILS NFR BLD AUTO: 1.1 % — SIGNIFICANT CHANGE UP (ref 0–2)
BILIRUB SERPL-MCNC: 0.2 MG/DL — SIGNIFICANT CHANGE UP (ref 0.2–1.2)
BUN SERPL-MCNC: 22 MG/DL — SIGNIFICANT CHANGE UP (ref 7–23)
CALCIUM SERPL-MCNC: 9.9 MG/DL — SIGNIFICANT CHANGE UP (ref 8.4–10.5)
CHLORIDE SERPL-SCNC: 101 MMOL/L — SIGNIFICANT CHANGE UP (ref 96–108)
CO2 SERPL-SCNC: 26 MMOL/L — SIGNIFICANT CHANGE UP (ref 22–31)
CREAT SERPL-MCNC: 0.96 MG/DL — SIGNIFICANT CHANGE UP (ref 0.5–1.3)
EGFR: 90 ML/MIN/1.73M2 — SIGNIFICANT CHANGE UP
EOSINOPHIL # BLD AUTO: 0.2 K/UL — SIGNIFICANT CHANGE UP (ref 0–0.5)
EOSINOPHIL NFR BLD AUTO: 2.1 % — SIGNIFICANT CHANGE UP (ref 0–6)
GLUCOSE SERPL-MCNC: 93 MG/DL — SIGNIFICANT CHANGE UP (ref 70–99)
HCT VFR BLD CALC: 42.5 % — SIGNIFICANT CHANGE UP (ref 39–50)
HGB BLD-MCNC: 14.3 G/DL — SIGNIFICANT CHANGE UP (ref 13–17)
IMM GRANULOCYTES NFR BLD AUTO: 1.9 % — HIGH (ref 0–0.9)
LYMPHOCYTES # BLD AUTO: 1.69 K/UL — SIGNIFICANT CHANGE UP (ref 1–3.3)
LYMPHOCYTES # BLD AUTO: 17.6 % — SIGNIFICANT CHANGE UP (ref 13–44)
MAGNESIUM SERPL-MCNC: 2 MG/DL — SIGNIFICANT CHANGE UP (ref 1.6–2.6)
MCHC RBC-ENTMCNC: 28.1 PG — SIGNIFICANT CHANGE UP (ref 27–34)
MCHC RBC-ENTMCNC: 33.6 G/DL — SIGNIFICANT CHANGE UP (ref 32–36)
MCV RBC AUTO: 83.7 FL — SIGNIFICANT CHANGE UP (ref 80–100)
MONOCYTES # BLD AUTO: 0.77 K/UL — SIGNIFICANT CHANGE UP (ref 0–0.9)
MONOCYTES NFR BLD AUTO: 8 % — SIGNIFICANT CHANGE UP (ref 2–14)
NEUTROPHILS # BLD AUTO: 6.63 K/UL — SIGNIFICANT CHANGE UP (ref 1.8–7.4)
NEUTROPHILS NFR BLD AUTO: 69.3 % — SIGNIFICANT CHANGE UP (ref 43–77)
NRBC # BLD: 1 /100 WBCS — HIGH (ref 0–0)
PLATELET # BLD AUTO: 428 K/UL — HIGH (ref 150–400)
POTASSIUM SERPL-MCNC: 4.2 MMOL/L — SIGNIFICANT CHANGE UP (ref 3.5–5.3)
POTASSIUM SERPL-SCNC: 4.2 MMOL/L — SIGNIFICANT CHANGE UP (ref 3.5–5.3)
PROT SERPL-MCNC: 7.7 G/DL — SIGNIFICANT CHANGE UP (ref 6–8.3)
RBC # BLD: 5.08 M/UL — SIGNIFICANT CHANGE UP (ref 4.2–5.8)
RBC # FLD: 13.2 % — SIGNIFICANT CHANGE UP (ref 10.3–14.5)
SODIUM SERPL-SCNC: 136 MMOL/L — SIGNIFICANT CHANGE UP (ref 135–145)
WBC # BLD: 9.58 K/UL — SIGNIFICANT CHANGE UP (ref 3.8–10.5)
WBC # FLD AUTO: 9.58 K/UL — SIGNIFICANT CHANGE UP (ref 3.8–10.5)

## 2024-04-01 PROCEDURE — 93010 ELECTROCARDIOGRAM REPORT: CPT

## 2024-04-02 ENCOUNTER — NON-APPOINTMENT (OUTPATIENT)
Age: 59
End: 2024-04-02

## 2024-04-02 DIAGNOSIS — R11.2 NAUSEA WITH VOMITING, UNSPECIFIED: ICD-10-CM

## 2024-04-02 DIAGNOSIS — Z51.11 ENCOUNTER FOR ANTINEOPLASTIC CHEMOTHERAPY: ICD-10-CM

## 2024-04-02 LAB — CEA SERPL-MCNC: 1 NG/ML — SIGNIFICANT CHANGE UP (ref 0–3.8)

## 2024-04-03 ENCOUNTER — OUTPATIENT (OUTPATIENT)
Dept: OUTPATIENT SERVICES | Facility: HOSPITAL | Age: 59
LOS: 1 days | End: 2024-04-03
Payer: COMMERCIAL

## 2024-04-03 ENCOUNTER — APPOINTMENT (OUTPATIENT)
Dept: UROLOGY | Facility: CLINIC | Age: 59
End: 2024-04-03
Payer: COMMERCIAL

## 2024-04-03 VITALS — DIASTOLIC BLOOD PRESSURE: 90 MMHG | OXYGEN SATURATION: 100 % | SYSTOLIC BLOOD PRESSURE: 154 MMHG | HEART RATE: 80 BPM

## 2024-04-03 DIAGNOSIS — Z98.890 OTHER SPECIFIED POSTPROCEDURAL STATES: Chronic | ICD-10-CM

## 2024-04-03 DIAGNOSIS — R35.0 FREQUENCY OF MICTURITION: ICD-10-CM

## 2024-04-03 PROCEDURE — 51720 TREATMENT OF BLADDER LESION: CPT

## 2024-04-03 RX ORDER — BACILLUS CALMETTE-GUERIN 50 MG/50ML
50 POWDER, FOR SUSPENSION INTRAVESICAL
Qty: 1 | Refills: 0 | Status: COMPLETED | OUTPATIENT
Start: 2024-03-13 | End: 2024-04-03

## 2024-04-03 RX ORDER — BACILLUS CALMETTE-GUERIN 50 MG/50ML
50 POWDER, FOR SUSPENSION INTRAVESICAL
Refills: 0 | Status: COMPLETED | OUTPATIENT
Start: 2024-04-03

## 2024-04-03 RX ADMIN — BACILLUS CALMETTE-GUERIN 0 MG: 50 POWDER, FOR SUSPENSION INTRAVESICAL at 00:00

## 2024-04-05 DIAGNOSIS — C18.9 MALIGNANT NEOPLASM OF COLON, UNSPECIFIED: ICD-10-CM

## 2024-04-05 DIAGNOSIS — Z45.2 ENCOUNTER FOR ADJUSTMENT AND MANAGEMENT OF VASCULAR ACCESS DEVICE: ICD-10-CM

## 2024-04-10 ENCOUNTER — NON-APPOINTMENT (OUTPATIENT)
Age: 59
End: 2024-04-10

## 2024-04-15 DIAGNOSIS — C67.8 MALIGNANT NEOPLASM OF OVERLAPPING SITES OF BLADDER: ICD-10-CM

## 2024-04-16 ENCOUNTER — NON-APPOINTMENT (OUTPATIENT)
Age: 59
End: 2024-04-16

## 2024-04-17 ENCOUNTER — APPOINTMENT (OUTPATIENT)
Dept: HEMATOLOGY ONCOLOGY | Facility: CLINIC | Age: 59
End: 2024-04-17
Payer: COMMERCIAL

## 2024-04-17 VITALS
DIASTOLIC BLOOD PRESSURE: 78 MMHG | OXYGEN SATURATION: 98 % | BODY MASS INDEX: 23.11 KG/M2 | HEART RATE: 77 BPM | RESPIRATION RATE: 16 BRPM | TEMPERATURE: 97.7 F | WEIGHT: 145.5 LBS | SYSTOLIC BLOOD PRESSURE: 173 MMHG

## 2024-04-17 PROCEDURE — 99214 OFFICE O/P EST MOD 30 MIN: CPT

## 2024-04-17 PROCEDURE — G2211 COMPLEX E/M VISIT ADD ON: CPT

## 2024-04-17 NOTE — HISTORY OF PRESENT ILLNESS
[Disease: _____________________] : Disease: [unfilled] [T: ___] : T[unfilled] [N: ___] : N[unfilled] [M: ___] : M[unfilled] [de-identified] : Mr. Tiwari is a 59 year old male presenting to the office for an initial consultation of colon CA to discuss adjuvant treatment.   Had onset of a change in bowel habits (ribbon like), rectal bleeding ~ one month ago. Seen by GI. Jan 9, 2024 underwent initial screening colonoscopy revealing a malignant appearing lesion of the distal sigmoid the bottom edge of which was 15 cm from the anal verge which was biopsied. The colonoscope could not traverse the lesion. He underwent transurethral resection of a bladder tumor and an anterior resection. He was found to have a bladder lesion and a known biopsy-proven adenocarcinoma of the colon. Final pathology on the bladder tumor revealed an element of a high-grade lesion but no muscular invasion.   The final pathology on the colon lesion revealed a T4 N2 lesion with 5 of 21 sampled lymph nodes being positive for micrometastatic disease.  Additionally perineural and perivascular invasion was seen with 2 tumor deposits in the pericolonic fat.  Seen urology and had his catheter and indwelling double-J stent on the right removed. He is having some spasms related to that. He is otherwise doing well with no significant incisional pain. He is tolerating diet and moving his bowels. He denies rectal bleeding or temperature elevation.   Pending sigmoidoscopy under sedation to assess his colorectal anastomosis by Dr. Alegre   PMHx: None prior, recent diagnosis ight lateral wall bladder tumor, transurethral resection Invasive urothelial carcinoma PSx: Hernia repair FHx:No family history of blood or cancer  Social Hx: Social etoh, denies tobacco or illicit drug use. Lives at home with wife dudley and 2 children (Danni - Daughter 25, Mate - son 23). 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 Admits to YVETTE on his fingertips that last 7-8 days and subsided afterwards. No LE involvement with mild PO involvement. No restrictions with using his hands or foot [de-identified] : Adenocarcinoma  [de-identified] : CEA 1.5 (1/25/24) [de-identified] : Colorectal Surgery: Aquilino Alegre Urology: Harleen Malcolm PCP: Kamala Quijano GI: Min CLOTILDECurt Andrade  Patient cell [phone] Wife Rafaela [phone] Daughter Danni [phone] Son Allen

## 2024-04-17 NOTE — ASSESSMENT
[FreeTextEntry1] : Mr. Tiwari is a 59 year old male presenting to the office for an initial consultation of colon CA to discuss adjuvant treatment. Accompanied by wife Rafaela.   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 He was found to have a bladder lesion and a known biopsy-proven adenocarcinoma of the colon. Final pathology on the bladder tumor revealed an element of a high-grade lesion but no muscular invasion.  The final pathology on the colon lesion revealed a T4 N2 lesion with 5 of 21 sampled lymph nodes being positive for micrometastatic disease. Additionally perineural and perivascular invasion was seen with 2 tumor deposits in the pericolonic fat. CT C/A/P: no evidence of metastasis  CEA 1/25/24: 1.5   Discussed given high risk disease and stage III would require adjuvant chemotherapy FOLFOX vs XELOX 6 months.   XELOX C1 04/01 C2 scheduled for 04/22  Steve Krause D#1 03/2024 - not detected D#2 pending collection on 06/2024  He is here for toxicity evaluation s/p one cycle of XELOX on 04/01/2024 C2 scheduled for Monday, 04/22/2024 Labs will be done on Friday, 04/19/2024  Patient did well after C#1. Admits to YVETTE on his fingertips that last 7-8 days and subsided afterwards. No LE involvement with mild PO involvement. No restrictions with using his hands or foot  Labs on Fridays prior to treatment on Monday.  F/U during C#3 of his cycle.  Dr. Mccormick agrees with above plan.

## 2024-04-19 ENCOUNTER — RESULT REVIEW (OUTPATIENT)
Age: 59
End: 2024-04-19

## 2024-04-19 LAB
HCT VFR BLD CALC: 40.6 % — SIGNIFICANT CHANGE UP (ref 39–50)
HGB BLD-MCNC: 13.3 G/DL — SIGNIFICANT CHANGE UP (ref 13–17)
MCHC RBC-ENTMCNC: 27.9 PG — SIGNIFICANT CHANGE UP (ref 27–34)
MCHC RBC-ENTMCNC: 32.8 GM/DL — SIGNIFICANT CHANGE UP (ref 32–36)
MCV RBC AUTO: 85.3 FL — SIGNIFICANT CHANGE UP (ref 80–100)
PLATELET # BLD AUTO: 497 K/UL — HIGH (ref 150–400)
RBC # BLD: 4.76 M/UL — SIGNIFICANT CHANGE UP (ref 4.2–5.8)
RBC # FLD: 15.4 % — HIGH (ref 10.3–14.5)
WBC # BLD: 8.34 K/UL — SIGNIFICANT CHANGE UP (ref 3.8–10.5)
WBC # FLD AUTO: 8.34 K/UL — SIGNIFICANT CHANGE UP (ref 3.8–10.5)

## 2024-04-20 LAB
ALBUMIN SERPL ELPH-MCNC: 4.3 G/DL
ALP BLD-CCNC: 67 U/L
ALT SERPL-CCNC: 20 U/L
ANION GAP SERPL CALC-SCNC: 11 MMOL/L
AST SERPL-CCNC: 24 U/L
BASOPHILS # BLD AUTO: 0.06 K/UL
BASOPHILS NFR BLD AUTO: 0.7 %
BILIRUB SERPL-MCNC: 0.3 MG/DL
BUN SERPL-MCNC: 23 MG/DL
CALCIUM SERPL-MCNC: 9.8 MG/DL
CHLORIDE SERPL-SCNC: 102 MMOL/L
CO2 SERPL-SCNC: 26 MMOL/L
CREAT SERPL-MCNC: 1.12 MG/DL
EGFR: 75 ML/MIN/1.73M2
EOSINOPHIL # BLD AUTO: 0.13 K/UL
EOSINOPHIL NFR BLD AUTO: 1.6 %
GLUCOSE SERPL-MCNC: 98 MG/DL
HCT VFR BLD CALC: 39.1 %
HGB BLD-MCNC: 13.1 G/DL
IMM GRANULOCYTES NFR BLD AUTO: 0.6 %
LYMPHOCYTES # BLD AUTO: 1.6 K/UL
LYMPHOCYTES NFR BLD AUTO: 19.6 %
MAN DIFF?: NORMAL
MCHC RBC-ENTMCNC: 28.7 PG
MCHC RBC-ENTMCNC: 33.5 GM/DL
MCV RBC AUTO: 85.7 FL
MONOCYTES # BLD AUTO: 0.9 K/UL
MONOCYTES NFR BLD AUTO: 11 %
NEUTROPHILS # BLD AUTO: 5.44 K/UL
NEUTROPHILS NFR BLD AUTO: 66.5 %
PLATELET # BLD AUTO: 503 K/UL
POTASSIUM SERPL-SCNC: 4.6 MMOL/L
PROT SERPL-MCNC: 6.7 G/DL
RBC # BLD: 4.56 M/UL
RBC # FLD: 15.1 %
SODIUM SERPL-SCNC: 139 MMOL/L
WBC # FLD AUTO: 8.18 K/UL

## 2024-04-21 LAB
BASOPHILS # BLD AUTO: 0.04 K/UL — SIGNIFICANT CHANGE UP (ref 0–0.2)
BASOPHILS NFR BLD AUTO: 0.5 % — SIGNIFICANT CHANGE UP (ref 0–2)
EOSINOPHIL # BLD AUTO: 0.11 K/UL — SIGNIFICANT CHANGE UP (ref 0–0.5)
EOSINOPHIL NFR BLD AUTO: 1.4 % — SIGNIFICANT CHANGE UP (ref 0–6)
IMM GRANULOCYTES NFR BLD AUTO: 0.5 % — SIGNIFICANT CHANGE UP (ref 0–0.9)
LYMPHOCYTES # BLD AUTO: 1.65 K/UL — SIGNIFICANT CHANGE UP (ref 1–3.3)
LYMPHOCYTES # BLD AUTO: 20.9 % — SIGNIFICANT CHANGE UP (ref 13–44)
MONOCYTES # BLD AUTO: 0.79 K/UL — SIGNIFICANT CHANGE UP (ref 0–0.9)
MONOCYTES NFR BLD AUTO: 10 % — SIGNIFICANT CHANGE UP (ref 2–14)
NEUTROPHILS # BLD AUTO: 5.26 K/UL — SIGNIFICANT CHANGE UP (ref 1.8–7.4)
NEUTROPHILS NFR BLD AUTO: 66.7 % — SIGNIFICANT CHANGE UP (ref 43–77)
NRBC # BLD: 0 /100 WBCS — SIGNIFICANT CHANGE UP (ref 0–0)

## 2024-04-22 ENCOUNTER — APPOINTMENT (OUTPATIENT)
Dept: INFUSION THERAPY | Facility: HOSPITAL | Age: 59
End: 2024-04-22

## 2024-04-22 ENCOUNTER — RESULT REVIEW (OUTPATIENT)
Age: 59
End: 2024-04-22

## 2024-04-22 ENCOUNTER — APPOINTMENT (OUTPATIENT)
Dept: HEMATOLOGY ONCOLOGY | Facility: CLINIC | Age: 59
End: 2024-04-22

## 2024-04-22 ENCOUNTER — NON-APPOINTMENT (OUTPATIENT)
Age: 59
End: 2024-04-22

## 2024-04-22 LAB
ALBUMIN SERPL ELPH-MCNC: 4.2 G/DL — SIGNIFICANT CHANGE UP (ref 3.3–5)
ALP SERPL-CCNC: 61 U/L — SIGNIFICANT CHANGE UP (ref 40–120)
ALT FLD-CCNC: 18 U/L — SIGNIFICANT CHANGE UP (ref 10–45)
ANION GAP SERPL CALC-SCNC: 10 MMOL/L — SIGNIFICANT CHANGE UP (ref 5–17)
AST SERPL-CCNC: 27 U/L — SIGNIFICANT CHANGE UP (ref 10–40)
BILIRUB SERPL-MCNC: 0.4 MG/DL — SIGNIFICANT CHANGE UP (ref 0.2–1.2)
BUN SERPL-MCNC: 24 MG/DL — HIGH (ref 7–23)
CALCIUM SERPL-MCNC: 9.4 MG/DL — SIGNIFICANT CHANGE UP (ref 8.4–10.5)
CHLORIDE SERPL-SCNC: 105 MMOL/L — SIGNIFICANT CHANGE UP (ref 96–108)
CO2 SERPL-SCNC: 27 MMOL/L — SIGNIFICANT CHANGE UP (ref 22–31)
CREAT SERPL-MCNC: 1.07 MG/DL — SIGNIFICANT CHANGE UP (ref 0.5–1.3)
EGFR: 79 ML/MIN/1.73M2 — SIGNIFICANT CHANGE UP
GLUCOSE SERPL-MCNC: 97 MG/DL — SIGNIFICANT CHANGE UP (ref 70–99)
MAGNESIUM SERPL-MCNC: 2.1 MG/DL — SIGNIFICANT CHANGE UP (ref 1.6–2.6)
POTASSIUM SERPL-MCNC: 4.1 MMOL/L — SIGNIFICANT CHANGE UP (ref 3.5–5.3)
POTASSIUM SERPL-SCNC: 4.1 MMOL/L — SIGNIFICANT CHANGE UP (ref 3.5–5.3)
PROT SERPL-MCNC: 6.8 G/DL — SIGNIFICANT CHANGE UP (ref 6–8.3)
SODIUM SERPL-SCNC: 142 MMOL/L — SIGNIFICANT CHANGE UP (ref 135–145)

## 2024-04-23 LAB — CEA SERPL-MCNC: 1.8 NG/ML — SIGNIFICANT CHANGE UP (ref 0–3.8)

## 2024-05-02 ENCOUNTER — OUTPATIENT (OUTPATIENT)
Dept: OUTPATIENT SERVICES | Facility: HOSPITAL | Age: 59
LOS: 1 days | Discharge: HOME | End: 2024-05-02

## 2024-05-02 DIAGNOSIS — Z98.890 OTHER SPECIFIED POSTPROCEDURAL STATES: Chronic | ICD-10-CM

## 2024-05-02 DIAGNOSIS — C18.9 MALIGNANT NEOPLASM OF COLON, UNSPECIFIED: ICD-10-CM

## 2024-05-10 ENCOUNTER — LABORATORY RESULT (OUTPATIENT)
Age: 59
End: 2024-05-10

## 2024-05-10 ENCOUNTER — NON-APPOINTMENT (OUTPATIENT)
Age: 59
End: 2024-05-10

## 2024-05-13 ENCOUNTER — RESULT REVIEW (OUTPATIENT)
Age: 59
End: 2024-05-13

## 2024-05-13 ENCOUNTER — APPOINTMENT (OUTPATIENT)
Dept: INFUSION THERAPY | Facility: HOSPITAL | Age: 59
End: 2024-05-13

## 2024-05-13 ENCOUNTER — APPOINTMENT (OUTPATIENT)
Dept: HEMATOLOGY ONCOLOGY | Facility: CLINIC | Age: 59
End: 2024-05-13
Payer: COMMERCIAL

## 2024-05-13 ENCOUNTER — APPOINTMENT (OUTPATIENT)
Dept: HEMATOLOGY ONCOLOGY | Facility: CLINIC | Age: 59
End: 2024-05-13

## 2024-05-13 LAB
ALBUMIN SERPL ELPH-MCNC: 4.4 G/DL — SIGNIFICANT CHANGE UP (ref 3.3–5)
ALP SERPL-CCNC: 62 U/L — SIGNIFICANT CHANGE UP (ref 40–120)
ALT FLD-CCNC: 20 U/L — SIGNIFICANT CHANGE UP (ref 10–45)
ANION GAP SERPL CALC-SCNC: 10 MMOL/L — SIGNIFICANT CHANGE UP (ref 5–17)
AST SERPL-CCNC: 41 U/L — HIGH (ref 10–40)
BILIRUB SERPL-MCNC: 0.6 MG/DL — SIGNIFICANT CHANGE UP (ref 0.2–1.2)
BUN SERPL-MCNC: 22 MG/DL — SIGNIFICANT CHANGE UP (ref 7–23)
CALCIUM SERPL-MCNC: 9.6 MG/DL — SIGNIFICANT CHANGE UP (ref 8.4–10.5)
CHLORIDE SERPL-SCNC: 102 MMOL/L — SIGNIFICANT CHANGE UP (ref 96–108)
CO2 SERPL-SCNC: 25 MMOL/L — SIGNIFICANT CHANGE UP (ref 22–31)
CREAT SERPL-MCNC: 1.02 MG/DL — SIGNIFICANT CHANGE UP (ref 0.5–1.3)
EGFR: 84 ML/MIN/1.73M2 — SIGNIFICANT CHANGE UP
GLUCOSE SERPL-MCNC: 86 MG/DL — SIGNIFICANT CHANGE UP (ref 70–99)
MAGNESIUM SERPL-MCNC: 2.3 MG/DL — SIGNIFICANT CHANGE UP (ref 1.6–2.6)
POTASSIUM SERPL-MCNC: 4.5 MMOL/L — SIGNIFICANT CHANGE UP (ref 3.5–5.3)
POTASSIUM SERPL-SCNC: 4.5 MMOL/L — SIGNIFICANT CHANGE UP (ref 3.5–5.3)
PROT SERPL-MCNC: 6.9 G/DL — SIGNIFICANT CHANGE UP (ref 6–8.3)
SODIUM SERPL-SCNC: 137 MMOL/L — SIGNIFICANT CHANGE UP (ref 135–145)

## 2024-05-13 PROCEDURE — 99214 OFFICE O/P EST MOD 30 MIN: CPT

## 2024-05-13 PROCEDURE — G2211 COMPLEX E/M VISIT ADD ON: CPT

## 2024-05-13 NOTE — HISTORY OF PRESENT ILLNESS
[Disease: _____________________] : Disease: [unfilled] [T: ___] : T[unfilled] [N: ___] : N[unfilled] [M: ___] : M[unfilled] [de-identified] : Mr. Tiwari is a 59 year old male presenting to the office for an initial consultation of colon CA to discuss adjuvant treatment.   Had onset of a change in bowel habits (ribbon like), rectal bleeding ~ one month ago. Seen by GI. Jan 9, 2024 underwent initial screening colonoscopy revealing a malignant appearing lesion of the distal sigmoid the bottom edge of which was 15 cm from the anal verge which was biopsied. The colonoscope could not traverse the lesion. He underwent transurethral resection of a bladder tumor and an anterior resection. He was found to have a bladder lesion and a known biopsy-proven adenocarcinoma of the colon. Final pathology on the bladder tumor revealed an element of a high-grade lesion but no muscular invasion.   The final pathology on the colon lesion revealed a T4 N2 lesion with 5 of 21 sampled lymph nodes being positive for micrometastatic disease.  Additionally perineural and perivascular invasion was seen with 2 tumor deposits in the pericolonic fat.  Seen urology and had his catheter and indwelling double-J stent on the right removed. He is having some spasms related to that. He is otherwise doing well with no significant incisional pain. He is tolerating diet and moving his bowels. He denies rectal bleeding or temperature elevation.   Pending sigmoidoscopy under sedation to assess his colorectal anastomosis by Dr. Alegre   PMHx: None prior, recent diagnosis ight lateral wall bladder tumor, transurethral resection Invasive urothelial carcinoma PSx: Hernia repair FHx:No family history of blood or cancer  Social Hx: Social etoh, denies tobacco or illicit drug use. Lives at home with wife dudley and 2 children (Danni - Daughter 25, Mate - son 23). 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 Admits to YVETTE on his fingertips that last 7-8 days and subsided afterwards. No LE involvement with mild PO involvement. No restrictions with using his hands or foot  5/13/24 C3 XELOX today. Oxaliplatin 130 mg/m2. Doing well on treatment. Admits to YVETTE that last ~ 9-10 days after his last cycle which abated afterwards. Noticed on his fingertips with no LE involvement. Denies it currently and is able to use his hands and foot without any restrictions. On days of treatment notices transient "jaw lock ness" when he bites and chews. No issues otherwise. Working on days of treatment and during his off week. Will repeat evelyn lyons during next cycle. He did not undergo colonoscopy and will require one 6 months after surgery. He is due soon for sigmoidiscopy with Dr. Alegre. [de-identified] : Adenocarcinoma  [de-identified] : CEA 1.5 (1/25/24) [de-identified] : Colorectal Surgery: Aquilino Alegre Urology: Harleen Malcolm PCP: Kamala Quijano GI: Min CLOTILDECurt Andrade  Patient cell [phone] Wife Rafaela [phone] Daughter Danni [phone] Son Allen

## 2024-05-13 NOTE — ASSESSMENT
[FreeTextEntry1] : Mr. Tiwari is a 59 year old male presenting to the office for an initial consultation of colon CA to discuss adjuvant treatment. Accompanied by wife Rafaela.   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 He was found to have a bladder lesion and a known biopsy-proven adenocarcinoma of the colon. Final pathology on the bladder tumor revealed an element of a high-grade lesion but no muscular invasion.  The final pathology on the colon lesion revealed a T4 N2 lesion with 5 of 21 sampled lymph nodes being positive for micrometastatic disease. Additionally perineural and perivascular invasion was seen with 2 tumor deposits in the pericolonic fat. CT C/A/P: no evidence of metastasis  CEA 1/25/24: 1.5   Discussed given high risk disease and stage III would require adjuvant chemotherapy FOLFOX vs XELOX 6 months.   XELOX C1 04/01 C2 04/22 C3 scheduled for 6/3  Steve Krause D#1 03/2024 - not detected D#2 next month, 6/3/2024  C3 XELOX today. Oxaliplatin 130 mg/m2.  Admits to YVETTE that last ~ 9-10 days after his last cycle which abated afterwards. Noticed on his fingertips with no LE involvement. Denies it currently and is able to use his hands and foot without any restrictions. On days of treatment notices transient "jaw lock ness" when he bites and chews. No issues otherwise. Working on days of treatment and during his off week.  Will repeat steve krause during next cycle.  He did not undergo colonoscopy and will require one 6 months after surgery. He is due soon for sigmoidiscopy with Dr. Alegre.  Labs on Fridays prior to treatment on Monday.  F/U during C#3 of his cycle.  Dr. Mccormick agrees with above plan.

## 2024-05-14 DIAGNOSIS — Z51.11 ENCOUNTER FOR ANTINEOPLASTIC CHEMOTHERAPY: ICD-10-CM

## 2024-05-14 DIAGNOSIS — R11.2 NAUSEA WITH VOMITING, UNSPECIFIED: ICD-10-CM

## 2024-05-14 LAB — CEA SERPL-MCNC: 1.6 NG/ML — SIGNIFICANT CHANGE UP (ref 0–3.8)

## 2024-06-03 ENCOUNTER — NON-APPOINTMENT (OUTPATIENT)
Age: 59
End: 2024-06-03

## 2024-06-03 ENCOUNTER — APPOINTMENT (OUTPATIENT)
Dept: INFUSION THERAPY | Facility: HOSPITAL | Age: 59
End: 2024-06-03

## 2024-06-03 ENCOUNTER — RESULT REVIEW (OUTPATIENT)
Age: 59
End: 2024-06-03

## 2024-06-03 ENCOUNTER — APPOINTMENT (OUTPATIENT)
Dept: HEMATOLOGY ONCOLOGY | Facility: CLINIC | Age: 59
End: 2024-06-03

## 2024-06-03 LAB
ALBUMIN SERPL ELPH-MCNC: 4.4 G/DL — SIGNIFICANT CHANGE UP (ref 3.3–5)
ALBUMIN SERPL ELPH-MCNC: 4.6 G/DL
ALP BLD-CCNC: 63 U/L
ALP SERPL-CCNC: 58 U/L — SIGNIFICANT CHANGE UP (ref 40–120)
ALT FLD-CCNC: 36 U/L — SIGNIFICANT CHANGE UP (ref 10–45)
ALT SERPL-CCNC: 35 U/L
ANION GAP SERPL CALC-SCNC: 10 MMOL/L — SIGNIFICANT CHANGE UP (ref 5–17)
ANION GAP SERPL CALC-SCNC: 8 MMOL/L
AST SERPL-CCNC: 40 U/L
AST SERPL-CCNC: 40 U/L — SIGNIFICANT CHANGE UP (ref 10–40)
BASOPHILS # BLD AUTO: 0.03 K/UL
BASOPHILS NFR BLD AUTO: 0.5 %
BILIRUB SERPL-MCNC: 0.3 MG/DL
BILIRUB SERPL-MCNC: 0.4 MG/DL — SIGNIFICANT CHANGE UP (ref 0.2–1.2)
BUN SERPL-MCNC: 23 MG/DL — SIGNIFICANT CHANGE UP (ref 7–23)
BUN SERPL-MCNC: 26 MG/DL
CALCIUM SERPL-MCNC: 10 MG/DL
CALCIUM SERPL-MCNC: 9.5 MG/DL — SIGNIFICANT CHANGE UP (ref 8.4–10.5)
CHLORIDE SERPL-SCNC: 103 MMOL/L
CHLORIDE SERPL-SCNC: 104 MMOL/L — SIGNIFICANT CHANGE UP (ref 96–108)
CO2 SERPL-SCNC: 25 MMOL/L — SIGNIFICANT CHANGE UP (ref 22–31)
CO2 SERPL-SCNC: 28 MMOL/L
CREAT SERPL-MCNC: 0.91 MG/DL — SIGNIFICANT CHANGE UP (ref 0.5–1.3)
CREAT SERPL-MCNC: 1.01 MG/DL
EGFR: 85 ML/MIN/1.73M2
EGFR: 96 ML/MIN/1.73M2 — SIGNIFICANT CHANGE UP
EOSINOPHIL # BLD AUTO: 0.07 K/UL
EOSINOPHIL NFR BLD AUTO: 1.1 %
GLUCOSE SERPL-MCNC: 95 MG/DL — SIGNIFICANT CHANGE UP (ref 70–99)
GLUCOSE SERPL-MCNC: 98 MG/DL
HCT VFR BLD CALC: 38.2 %
HGB BLD-MCNC: 12.7 G/DL
IMM GRANULOCYTES NFR BLD AUTO: 0.5 %
LYMPHOCYTES # BLD AUTO: 1.53 K/UL
LYMPHOCYTES NFR BLD AUTO: 24.1 %
MAGNESIUM SERPL-MCNC: 2 MG/DL — SIGNIFICANT CHANGE UP (ref 1.6–2.6)
MAN DIFF?: NORMAL
MCHC RBC-ENTMCNC: 29.1 PG
MCHC RBC-ENTMCNC: 33.2 GM/DL
MCV RBC AUTO: 87.4 FL
MONOCYTES # BLD AUTO: 0.87 K/UL
MONOCYTES NFR BLD AUTO: 13.7 %
NEUTROPHILS # BLD AUTO: 3.81 K/UL
NEUTROPHILS NFR BLD AUTO: 60.1 %
PLATELET # BLD AUTO: 364 K/UL
POTASSIUM SERPL-MCNC: 4.2 MMOL/L — SIGNIFICANT CHANGE UP (ref 3.5–5.3)
POTASSIUM SERPL-SCNC: 4.2 MMOL/L — SIGNIFICANT CHANGE UP (ref 3.5–5.3)
POTASSIUM SERPL-SCNC: 5.5 MMOL/L
PROT SERPL-MCNC: 6.7 G/DL — SIGNIFICANT CHANGE UP (ref 6–8.3)
PROT SERPL-MCNC: 7.2 G/DL
RBC # BLD: 4.37 M/UL
RBC # FLD: 18.7 %
SODIUM SERPL-SCNC: 138 MMOL/L — SIGNIFICANT CHANGE UP (ref 135–145)
SODIUM SERPL-SCNC: 139 MMOL/L
WBC # FLD AUTO: 6.34 K/UL

## 2024-06-04 LAB — CEA SERPL-MCNC: 2.1 NG/ML — SIGNIFICANT CHANGE UP (ref 0–3.8)

## 2024-06-11 NOTE — REASON FOR VISIT
[Home] : at home, [unfilled] , at the time of the visit. [Medical Office: ([facility])___] : at the medical office located in  [Verbal consent obtained from patient] : the patient, [unfilled] [Follow-up Visit ___] : a follow-up visit  for [unfilled]

## 2024-06-12 ENCOUNTER — APPOINTMENT (OUTPATIENT)
Dept: UROLOGY | Facility: CLINIC | Age: 59
End: 2024-06-12
Payer: COMMERCIAL

## 2024-06-12 DIAGNOSIS — C67.8 MALIGNANT NEOPLASM OF OVERLAPPING SITES OF BLADDER: ICD-10-CM

## 2024-06-12 PROCEDURE — 99443: CPT | Mod: 93

## 2024-06-12 NOTE — HISTORY OF PRESENT ILLNESS
[FreeTextEntry1] : Initial bladder tumor resection pathology as below 1.Right lateral wall bladder tumor, transurethral resection - Histologic type: Invasive urothelial carcinoma - Histologic grade: High grade - Superficially invades lamina propria - Muscularis propria: Muscularis propria (detrusor muscle) is not identified  2. Base of bladder tumor, excision - Fragments of benign fibromuscular tissue with chronic inflammation, consistent with muscularis propria (detrusor muscle) - No malignancy identified   Patient completed 6 weekly induction intravesical treatments with BCG.  Last treatment was April 3, 2024. He is currently undergoing adjuvant treatments for T4 adenocarcinoma of the colon also

## 2024-06-12 NOTE — ASSESSMENT
[FreeTextEntry1] : Will plan for cystoscopy bladder biopsy in July 2024.  At that time, he should be on the break from his chemotherapy treatments. Risk benefits alternatives discussed He may or may not needed Paris catheter at the time of discharge.  That will be determined based on the intraoperative findings.  If a Paris catheter is needed, it will remain indwelling for 5 to 7 days typically.  The pathology is favorable, then we will put him on a 3-year maintenance regimen

## 2024-06-17 ENCOUNTER — APPOINTMENT (OUTPATIENT)
Dept: COLORECTAL SURGERY | Facility: CLINIC | Age: 59
End: 2024-06-17
Payer: COMMERCIAL

## 2024-06-17 PROCEDURE — 45330 DIAGNOSTIC SIGMOIDOSCOPY: CPT

## 2024-06-19 RX ORDER — CAPECITABINE 500 MG/1
500 TABLET ORAL
Qty: 84 | Refills: 1 | Status: ACTIVE | COMMUNITY
Start: 2024-03-15 | End: 1900-01-01

## 2024-06-19 RX ORDER — DEXAMETHASONE 4 MG/1
4 TABLET ORAL
Qty: 30 | Refills: 1 | Status: ACTIVE | COMMUNITY
Start: 2024-03-15 | End: 1900-01-01

## 2024-06-22 ENCOUNTER — LABORATORY RESULT (OUTPATIENT)
Age: 59
End: 2024-06-22

## 2024-06-23 LAB
BASOPHILS # BLD AUTO: 0.05 K/UL
BASOPHILS NFR BLD AUTO: 0.9 %
EOSINOPHIL # BLD AUTO: 0.08 K/UL
EOSINOPHIL NFR BLD AUTO: 1.4 %
HCT VFR BLD CALC: 41 %
HGB BLD-MCNC: 13.2 G/DL
IMM GRANULOCYTES NFR BLD AUTO: 0.4 %
LYMPHOCYTES # BLD AUTO: 1.48 K/UL
LYMPHOCYTES NFR BLD AUTO: 26.2 %
MAN DIFF?: NORMAL
MCHC RBC-ENTMCNC: 28.9 PG
MCHC RBC-ENTMCNC: 32.2 GM/DL
MCV RBC AUTO: 89.7 FL
MONOCYTES # BLD AUTO: 0.86 K/UL
MONOCYTES NFR BLD AUTO: 15.2 %
NEUTROPHILS # BLD AUTO: 3.15 K/UL
NEUTROPHILS NFR BLD AUTO: 55.9 %
PLATELET # BLD AUTO: 362 K/UL
RBC # BLD: 4.57 M/UL
RBC # FLD: 19.1 %
WBC # FLD AUTO: 5.64 K/UL

## 2024-06-24 ENCOUNTER — RESULT REVIEW (OUTPATIENT)
Age: 59
End: 2024-06-24

## 2024-06-24 ENCOUNTER — APPOINTMENT (OUTPATIENT)
Dept: INFUSION THERAPY | Facility: HOSPITAL | Age: 59
End: 2024-06-24

## 2024-06-24 ENCOUNTER — OUTPATIENT (OUTPATIENT)
Dept: OUTPATIENT SERVICES | Facility: HOSPITAL | Age: 59
LOS: 1 days | End: 2024-06-24
Payer: COMMERCIAL

## 2024-06-24 ENCOUNTER — APPOINTMENT (OUTPATIENT)
Dept: HEMATOLOGY ONCOLOGY | Facility: CLINIC | Age: 59
End: 2024-06-24
Payer: COMMERCIAL

## 2024-06-24 VITALS
SYSTOLIC BLOOD PRESSURE: 151 MMHG | OXYGEN SATURATION: 98 % | WEIGHT: 153 LBS | DIASTOLIC BLOOD PRESSURE: 83 MMHG | HEIGHT: 67 IN | TEMPERATURE: 98 F | RESPIRATION RATE: 18 BRPM | HEART RATE: 84 BPM

## 2024-06-24 VITALS
HEART RATE: 80 BPM | BODY MASS INDEX: 24.77 KG/M2 | OXYGEN SATURATION: 98 % | DIASTOLIC BLOOD PRESSURE: 76 MMHG | SYSTOLIC BLOOD PRESSURE: 133 MMHG | WEIGHT: 156 LBS | RESPIRATION RATE: 16 BRPM | TEMPERATURE: 97.5 F

## 2024-06-24 DIAGNOSIS — Z98.890 OTHER SPECIFIED POSTPROCEDURAL STATES: Chronic | ICD-10-CM

## 2024-06-24 DIAGNOSIS — C67.8 MALIGNANT NEOPLASM OF OVERLAPPING SITES OF BLADDER: ICD-10-CM

## 2024-06-24 DIAGNOSIS — C18.9 MALIGNANT NEOPLASM OF COLON, UNSPECIFIED: ICD-10-CM

## 2024-06-24 DIAGNOSIS — Z01.818 ENCOUNTER FOR OTHER PREPROCEDURAL EXAMINATION: ICD-10-CM

## 2024-06-24 DIAGNOSIS — Z90.49 ACQUIRED ABSENCE OF OTHER SPECIFIED PARTS OF DIGESTIVE TRACT: Chronic | ICD-10-CM

## 2024-06-24 DIAGNOSIS — Z98.890 OTHER SPECIFIED POSTPROCEDURAL STATES: ICD-10-CM

## 2024-06-24 LAB
ALBUMIN SERPL ELPH-MCNC: 4.2 G/DL — SIGNIFICANT CHANGE UP (ref 3.3–5)
ALP SERPL-CCNC: 61 U/L — SIGNIFICANT CHANGE UP (ref 40–120)
ALT FLD-CCNC: 33 U/L — SIGNIFICANT CHANGE UP (ref 10–45)
ANION GAP SERPL CALC-SCNC: 11 MMOL/L — SIGNIFICANT CHANGE UP (ref 5–17)
AST SERPL-CCNC: 51 U/L — HIGH (ref 10–40)
BILIRUB SERPL-MCNC: 0.5 MG/DL — SIGNIFICANT CHANGE UP (ref 0.2–1.2)
BUN SERPL-MCNC: 22 MG/DL — SIGNIFICANT CHANGE UP (ref 7–23)
CALCIUM SERPL-MCNC: 9.5 MG/DL — SIGNIFICANT CHANGE UP (ref 8.4–10.5)
CEA SERPL-MCNC: 2.2 NG/ML — SIGNIFICANT CHANGE UP (ref 0–3.8)
CHLORIDE SERPL-SCNC: 103 MMOL/L — SIGNIFICANT CHANGE UP (ref 96–108)
CO2 SERPL-SCNC: 25 MMOL/L — SIGNIFICANT CHANGE UP (ref 22–31)
CREAT SERPL-MCNC: 1.01 MG/DL — SIGNIFICANT CHANGE UP (ref 0.5–1.3)
EGFR: 85 ML/MIN/1.73M2 — SIGNIFICANT CHANGE UP
GLUCOSE SERPL-MCNC: 114 MG/DL — HIGH (ref 70–99)
MAGNESIUM SERPL-MCNC: 2.2 MG/DL — SIGNIFICANT CHANGE UP (ref 1.6–2.6)
POTASSIUM SERPL-MCNC: 4.4 MMOL/L — SIGNIFICANT CHANGE UP (ref 3.5–5.3)
POTASSIUM SERPL-SCNC: 4.4 MMOL/L — SIGNIFICANT CHANGE UP (ref 3.5–5.3)
PROT SERPL-MCNC: 6.8 G/DL — SIGNIFICANT CHANGE UP (ref 6–8.3)
SODIUM SERPL-SCNC: 139 MMOL/L — SIGNIFICANT CHANGE UP (ref 135–145)

## 2024-06-24 PROCEDURE — G2211 COMPLEX E/M VISIT ADD ON: CPT

## 2024-06-24 PROCEDURE — 87086 URINE CULTURE/COLONY COUNT: CPT

## 2024-06-24 PROCEDURE — 99214 OFFICE O/P EST MOD 30 MIN: CPT

## 2024-06-24 PROCEDURE — G0463: CPT

## 2024-06-25 LAB
CULTURE RESULTS: NO GROWTH — SIGNIFICANT CHANGE UP
SPECIMEN SOURCE: SIGNIFICANT CHANGE UP

## 2024-07-10 ENCOUNTER — TRANSCRIPTION ENCOUNTER (OUTPATIENT)
Age: 59
End: 2024-07-10

## 2024-07-11 ENCOUNTER — APPOINTMENT (OUTPATIENT)
Dept: UROLOGY | Facility: HOSPITAL | Age: 59
End: 2024-07-11

## 2024-07-11 ENCOUNTER — OUTPATIENT (OUTPATIENT)
Dept: OUTPATIENT SERVICES | Facility: HOSPITAL | Age: 59
LOS: 1 days | Discharge: HOME | End: 2024-07-11

## 2024-07-11 ENCOUNTER — TRANSCRIPTION ENCOUNTER (OUTPATIENT)
Age: 59
End: 2024-07-11

## 2024-07-11 ENCOUNTER — RESULT REVIEW (OUTPATIENT)
Age: 59
End: 2024-07-11

## 2024-07-11 ENCOUNTER — OUTPATIENT (OUTPATIENT)
Dept: OUTPATIENT SERVICES | Facility: HOSPITAL | Age: 59
LOS: 1 days | End: 2024-07-11
Payer: COMMERCIAL

## 2024-07-11 VITALS
HEART RATE: 73 BPM | TEMPERATURE: 98 F | SYSTOLIC BLOOD PRESSURE: 175 MMHG | DIASTOLIC BLOOD PRESSURE: 98 MMHG | WEIGHT: 153 LBS | HEIGHT: 67 IN | RESPIRATION RATE: 16 BRPM | OXYGEN SATURATION: 98 %

## 2024-07-11 VITALS
SYSTOLIC BLOOD PRESSURE: 158 MMHG | DIASTOLIC BLOOD PRESSURE: 86 MMHG | OXYGEN SATURATION: 100 % | RESPIRATION RATE: 16 BRPM | HEART RATE: 61 BPM

## 2024-07-11 DIAGNOSIS — Z98.890 OTHER SPECIFIED POSTPROCEDURAL STATES: Chronic | ICD-10-CM

## 2024-07-11 DIAGNOSIS — Z90.49 ACQUIRED ABSENCE OF OTHER SPECIFIED PARTS OF DIGESTIVE TRACT: Chronic | ICD-10-CM

## 2024-07-11 DIAGNOSIS — C67.8 MALIGNANT NEOPLASM OF OVERLAPPING SITES OF BLADDER: ICD-10-CM

## 2024-07-11 DIAGNOSIS — C18.9 MALIGNANT NEOPLASM OF COLON, UNSPECIFIED: ICD-10-CM

## 2024-07-11 PROCEDURE — 88305 TISSUE EXAM BY PATHOLOGIST: CPT

## 2024-07-11 PROCEDURE — 52204 CYSTOSCOPY W/BIOPSY(S): CPT

## 2024-07-11 PROCEDURE — 88305 TISSUE EXAM BY PATHOLOGIST: CPT | Mod: 26

## 2024-07-11 PROCEDURE — 88112 CYTOPATH CELL ENHANCE TECH: CPT | Mod: 26

## 2024-07-11 PROCEDURE — 88112 CYTOPATH CELL ENHANCE TECH: CPT

## 2024-07-11 RX ORDER — DEXTROSE MONOHYDRATE AND SODIUM CHLORIDE 5; .3 G/100ML; G/100ML
1000 INJECTION, SOLUTION INTRAVENOUS
Refills: 0 | Status: DISCONTINUED | OUTPATIENT
Start: 2024-07-11 | End: 2024-07-11

## 2024-07-11 RX ORDER — ONDANSETRON HYDROCHLORIDE 2 MG/ML
4 INJECTION INTRAMUSCULAR; INTRAVENOUS ONCE
Refills: 0 | Status: DISCONTINUED | OUTPATIENT
Start: 2024-07-11 | End: 2024-07-11

## 2024-07-11 RX ORDER — SODIUM CHLORIDE 0.9 % (FLUSH) 0.9 %
3 SYRINGE (ML) INJECTION EVERY 8 HOURS
Refills: 0 | Status: DISCONTINUED | OUTPATIENT
Start: 2024-07-11 | End: 2024-07-11

## 2024-07-11 RX ORDER — CAPECITABINE 150 MG/1
3 TABLET, FILM COATED ORAL
Refills: 0 | DISCHARGE

## 2024-07-11 RX ORDER — FENTANYL CITRATE 50 UG/ML
25 INJECTION, SOLUTION INTRAMUSCULAR; INTRAVENOUS
Refills: 0 | Status: DISCONTINUED | OUTPATIENT
Start: 2024-07-11 | End: 2024-07-11

## 2024-07-11 RX ORDER — CEFAZOLIN 10 G/1
2000 INJECTION, POWDER, FOR SOLUTION INTRAVENOUS ONCE
Refills: 0 | Status: COMPLETED | OUTPATIENT
Start: 2024-07-11 | End: 2024-07-11

## 2024-07-11 RX ORDER — LIDOCAINE HYDROCHLORIDE 20 MG/ML
0.2 INJECTION, SOLUTION EPIDURAL; INFILTRATION; INTRACAUDAL; PERINEURAL ONCE
Refills: 0 | Status: DISCONTINUED | OUTPATIENT
Start: 2024-07-11 | End: 2024-07-11

## 2024-07-11 RX ORDER — OXYCODONE HYDROCHLORIDE 100 MG/5ML
5 SOLUTION ORAL ONCE
Refills: 0 | Status: DISCONTINUED | OUTPATIENT
Start: 2024-07-11 | End: 2024-07-11

## 2024-07-11 RX ORDER — FENTANYL CITRATE 50 UG/ML
50 INJECTION, SOLUTION INTRAMUSCULAR; INTRAVENOUS
Refills: 0 | Status: DISCONTINUED | OUTPATIENT
Start: 2024-07-11 | End: 2024-07-11

## 2024-07-11 RX ORDER — DEXAMETHASONE 1 MG/1
1 TABLET ORAL
Refills: 0 | DISCHARGE

## 2024-07-11 NOTE — ASU PATIENT PROFILE, ADULT - NS PRO AD PATIENT TYPE
Damion Yuniermary was seen and treated in our emergency department on 11/3/2022.    He may return to activity only as tolerated by his symptoms as long as he remains afebrile with a temp less than 100.4     Sincerely,     [facility]
Rafaela Tiwari (Spouse): [phone]/Health Care Proxy (HCP)

## 2024-07-11 NOTE — ASU DISCHARGE PLAN (ADULT/PEDIATRIC) - ASU DC SPECIAL INSTRUCTIONSFT
CATHETER: You are sent home with a morales catheter. If you still have a catheter, the nurses will review instructions and care before you go home.   GENERAL: It is common to have blood in your urine after your procedure. It may be pink or even red; inform your doctor if you have a significant amount of clot in the urine or if you are unable to void at all or if your catheter stops draining. The urine may clear and then become bloody again especially as you are more physically active. It is not uncommon to have some burning when you urinate, this will gradually improve. With a catheter in place, it is not uncommon to have occasional leakage or urine or blood around the catheter. Please call your urologist if this is excessive and/or the urine is not draining through the catheter into the bag.  BATHING: You may shower or bathe. If going home with morales, shower only until catheter is removed.  DIET: You may resume your regular diet and regular medication regimen.  PAIN: You may take Tylenol (acetaminophen) 650-975mg and/or Motrin (ibuprofen) 400-600mg, both available over the counter, for pain every 6 hours as needed. Do not exceed 4000mg of Tylenol (acetaminophen) daily. You may alternate these medications such that you take one or the other every 3 hours for around the clock pain coverage.  ANTIBIOTICS: You have been  given a prescription for an antibiotic, please take this medication as instructed and be sure to complete the entire course.  STOOL SOFTENERS: Do not allow yourself to become constipated as straining may cause bleeding. Take stool softeners or a laxative (ex. Miralax, Colace, Senokot, ExLax, etc), available over the counter, if needed.  ACTIVITY: No heavy lifting or strenuous exercise until you are evaluated at your post-operative appointment. Otherwise, you may return to your usual level of physical activity.  FOLLOW-UP: If you did not already schedule your post-operative appointment, please call your urologist to schedule a follow-up appointment. Please follow-up with Dr. Malcolm on Wednesday for catheter removal.   CALL YOUR UROLOGIST IF: You have any bleeding that does not stop, inability to void >8 hours, fever over 100.4 F, chills, persistent nausea/vomiting, changes in your incision concerning for infection, or if your pain is not controlled on your discharge pain medications.

## 2024-07-11 NOTE — ASU PREOP CHECKLIST - TAMPON REMOVED
POST COLONOSCOPY CARE     During your procedure today we found: Some stool samples and biopsies taken and sent to lab; we also noted some hemorrhoids today  ?   You may experience abdominal bloating or cramps due to air used to inflate the colon during the procedure. This is common and can be relieved by walking, lying on your left side with knees bent, and passing gas. A small amount of rectal bleeding may occur, especially if polyps or biopsies were removed. Do not use laxatives or enemas for one week following your procedure.     Post Upper Endoscopy     During your procedure today we: took some biopsies and sent those to lab    You may experience abdominal bloating or cramps due to air used to inflate the stomach during the procedure.   This is common and can be relieved by walking, belching, and passing gas.   Salt-water gargles and lozenges can help if you have a sore throat.     If any of the following problems occur, call us at [phone].     Severe pain/ discomfort in abdomen   Difficulty swallowing   Nausea and/or vomiting   Temperature above 101 degrees F   New/ increased bleeding from mouth or rectum, or black, tarry stools   Abdominal bloating not relieved by belching or passing gas   Chest pain or shortness of breath       Follow Up Plan:     1. Resume your regular diet, as tolerated.  2. The clinic will follow up with results and further recommendations within a week.      Due to sedation you received, you may not remember the procedure or the doctor’s explanation afterward.     Our medications sometimes cause dizziness, drowsiness and impaired judgment.   Therefore, please follow these recommendations for the next 24 hours.     Do not drive a car or operate any machinery.   Have a responsible adult drive you home.   Do not make critical decisions or sign any legal documents.   Do not drink alcohol   Do not return to work, or perform any tasks that require coordinated activity     We would like to  take this opportunity to thank you for choosing [facility]. Because your confidence in your caregivers is very important to us, it is our commitment to always treat you and your family with courtesy and respect. Our goal is to always answer any questions or worries or concerns you may have about the care you received If you have any suggestions for improvement or worries or concerns please do not hesitate to let us know.         Treating Hemorrhoids: Self-Care     Follow your healthcare provider’s advice about caring for your hemorrhoids at home. Some treatments help relieve symptoms right away. Others involve making changes in your diet and exercise habits. These can help ease constipation and prevent hemorrhoids from coming back.  Relieving symptoms  Your healthcare provider may prescribe anti-inflammatory medicine to help ease your symptoms. These tips will also help relieve pain and swelling.  · Take sitz baths. Taking a sitz bath means sitting in a few inches of warm bath water. Soaking for 10 minutes twice a day can provide relief from painful hemorrhoids. It can also help the area stay clean.  · Develop good bowel habits. Use the bathroom when you need to. Don’t ignore the urge to move your bowels. This can lead to constipation, hard stools, and straining. Also, don’t read while on the toilet. Sit only as long as needed. Wipe gently with soft, unscented toilet tissue or baby wipes.  · Use ice packs. Placing an ice pack on an external hemorrhoid can help relieve pain right away. It will also help reduce the blood clot. Use the ice for 15 to 20 minutes at a time. Keep a cloth between the ice and your skin to prevent skin damage.  · Use other measures. Laxatives and enemas can help ease constipation. But use them only on your healthcare provider’s advice. For symptom relief, try using cotton pads soaked in witch hazel. These are available at most drugstores. Over-the-counter hemorrhoid  ointments and petroleum jelly can also provide relief.  Add fiber to your diet  Adding fiber to your diet can help relieve constipation by making stools softer and easier to pass. To increase your fiber intake, your healthcare provider may recommend a bulking agent, such as psyllium. This is a high-fiber supplement available at most grocery stores and drugstores. Eating more fiber-rich foods will also help. There are two types of fiber:  · Insoluble fiber is the main ingredient in bulking agents. It’s also found in foods such as wheat bran, whole-grain breads, fresh fruits, and vegetables.  · Soluble fiber is found in foods such as oat bran. Although soluble fiber is good for you, it may not ease constipation as much as foods high in insoluble fiber.  Drink more water  Along with a high-fiber diet, drinking more water can help ease constipation. This is because insoluble fiber absorbs water, making stools soft and bulky. Be sure to drink plenty of water throughout the day. Drinking fruit juices, such as prune juice or apple juice, can also help prevent constipation.  Get more exercise  Regular exercise aids digestion and helps prevent constipation. It’s also great for your health. So talk with your healthcare provider about starting an exercise program. Low-impact activities, such as swimming or walking, are good places to start. Take it easy at first. And remember to drink plenty of water when you exercise.  High-fiber foods Easy ways to add fiber  High-fiber foods offer many benefits. By making your stools softer, they help heal and prevent swollen hemorrhoids. They may also help reduce the risk of colon and rectal cancer. Best of all, they’re usually low in calories and taste great. Here are some examples of fiber-rich foods.  · Whole grains, such as wheat bran, corn bran, and brown rice  · Vegetables, especially carrots, broccoli, cabbage, and peas  · Fruits, such as apples, bananas, raisins, peaches, prunes,  and pears  · Nuts and legumes, especially peanuts, lentils, and kidney beans  Easy ways to add fiber  The tips below offer some simple ways to add more high-fiber foods to your meals.  · Start your day with a high-fiber breakfast. Eat a wheat bran cereal along with a sliced banana. Or, try peanut butter on whole-wheat toast.  · Eat carrot sticks for snacks. They’re easy to prepare, taste great, and are low in calories.  · Use whole-grain breads instead of white bread for sandwiches.  · Eat fruits for treats. Try an apple and some raisins instead of a candy bar.  [employer] last reviewed this educational content on 6/1/2019  © 3273-8115 [employer]. [address] [place] [address], [place] [address]. All rights reserved. This information is not intended as a substitute for professional medical care. Always follow your healthcare professional's instructions.         n/a

## 2024-07-15 ENCOUNTER — APPOINTMENT (OUTPATIENT)
Dept: INFUSION THERAPY | Facility: HOSPITAL | Age: 59
End: 2024-07-15

## 2024-07-15 ENCOUNTER — NON-APPOINTMENT (OUTPATIENT)
Age: 59
End: 2024-07-15

## 2024-07-15 ENCOUNTER — APPOINTMENT (OUTPATIENT)
Dept: HEMATOLOGY ONCOLOGY | Facility: CLINIC | Age: 59
End: 2024-07-15

## 2024-07-16 LAB
NON-GYNECOLOGICAL CYTOLOGY STUDY: SIGNIFICANT CHANGE UP
SURGICAL PATHOLOGY STUDY: SIGNIFICANT CHANGE UP

## 2024-07-17 ENCOUNTER — APPOINTMENT (OUTPATIENT)
Dept: UROLOGY | Facility: CLINIC | Age: 59
End: 2024-07-17

## 2024-07-17 ENCOUNTER — OUTPATIENT (OUTPATIENT)
Dept: OUTPATIENT SERVICES | Facility: HOSPITAL | Age: 59
LOS: 1 days | End: 2024-07-17
Payer: COMMERCIAL

## 2024-07-17 VITALS
RESPIRATION RATE: 17 BRPM | SYSTOLIC BLOOD PRESSURE: 151 MMHG | WEIGHT: 155 LBS | HEART RATE: 73 BPM | DIASTOLIC BLOOD PRESSURE: 93 MMHG | BODY MASS INDEX: 24.62 KG/M2 | TEMPERATURE: 97.8 F | HEIGHT: 66.5 IN

## 2024-07-17 DIAGNOSIS — Z98.890 OTHER SPECIFIED POSTPROCEDURAL STATES: Chronic | ICD-10-CM

## 2024-07-17 DIAGNOSIS — Z90.49 ACQUIRED ABSENCE OF OTHER SPECIFIED PARTS OF DIGESTIVE TRACT: Chronic | ICD-10-CM

## 2024-07-17 DIAGNOSIS — R35.0 FREQUENCY OF MICTURITION: ICD-10-CM

## 2024-07-17 PROCEDURE — 99213 OFFICE O/P EST LOW 20 MIN: CPT | Mod: 25

## 2024-07-17 PROCEDURE — A4216: CPT | Mod: NC

## 2024-07-17 PROCEDURE — 51700 IRRIGATION OF BLADDER: CPT

## 2024-07-29 DIAGNOSIS — R39.9 UNSPECIFIED SYMPTOMS AND SIGNS INVOLVING THE GENITOURINARY SYSTEM: ICD-10-CM

## 2024-07-29 DIAGNOSIS — C67.8 MALIGNANT NEOPLASM OF OVERLAPPING SITES OF BLADDER: ICD-10-CM

## 2024-08-06 ENCOUNTER — APPOINTMENT (OUTPATIENT)
Dept: INFUSION THERAPY | Facility: HOSPITAL | Age: 59
End: 2024-08-06

## 2024-08-06 ENCOUNTER — APPOINTMENT (OUTPATIENT)
Dept: HEMATOLOGY ONCOLOGY | Facility: CLINIC | Age: 59
End: 2024-08-06

## 2024-08-06 PROCEDURE — 99214 OFFICE O/P EST MOD 30 MIN: CPT | Mod: 95

## 2024-08-06 NOTE — ASSESSMENT
[FreeTextEntry1] : Mr. Tiwari is a 59 year old male presenting to the office for an initial consultation of colon CA to discuss adjuvant treatment. Accompanied by wife Rafaela.   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 He was found to have a bladder lesion and a known biopsy-proven adenocarcinoma of the colon. Final pathology on the bladder tumor revealed an element of a high-grade lesion but no muscular invasion.  The final pathology on the colon lesion revealed a T4 N2 lesion with 5 of 21 sampled lymph nodes being positive for micrometastatic disease. Additionally perineural and perivascular invasion was seen with 2 tumor deposits in the pericolonic fat. CT C/A/P: no evidence of metastasis  CEA 1/25/24: 1.5   Discussed given high risk disease and stage III would require adjuvant chemotherapy FOLFOX vs XELOX 6 months.   XELOX C1 04/01 C2 04/22 C3 5/14 C4 6/3 - Oxaliplatin dose reduced to 100 mg/m2 C5 6/24 - last dose of Oxaliplatin C6D1 7/15 - Xeloda single agent C7 8/5  Steve Krause D#1 03/2024 - not detected D#2 6/3/24 - not detected D#3 - 9/2024   Colonoscopy in January 2025 Last sigmoidoscopy revealed JOAQUIN. Next one on 9/2024.  Last cycle of Oxaliplatin was on 6/24/24 - 5 cycles of Oxaliplatin. Currently on C#7 D#2 of Xeloda tablets.  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 Neuropathy is triggered by walking and exercising. It is constant on his hands.  He is able to text on his phone and button his jacket. No loss of balance. No issues on PO Xeloda.  Next signatera 9/2024  Patient will call to schedule his CT scan.  port flush with labs next month + Steve krause  f/u in office in 4-6 weeks.  Dr. Mccormick agrees with above plan.

## 2024-08-06 NOTE — HISTORY OF PRESENT ILLNESS
[Disease: _____________________] : Disease: [unfilled] [T: ___] : T[unfilled] [N: ___] : N[unfilled] [M: ___] : M[unfilled] [de-identified] : Mr. Tiwari is a 59 year old male presenting to the office for an initial consultation of colon CA to discuss adjuvant treatment.   Had onset of a change in bowel habits (ribbon like), rectal bleeding ~ one month ago. Seen by GI. Jan 9, 2024 underwent initial screening colonoscopy revealing a malignant appearing lesion of the distal sigmoid the bottom edge of which was 15 cm from the anal verge which was biopsied. The colonoscope could not traverse the lesion. He underwent transurethral resection of a bladder tumor and an anterior resection. He was found to have a bladder lesion and a known biopsy-proven adenocarcinoma of the colon. Final pathology on the bladder tumor revealed an element of a high-grade lesion but no muscular invasion.   The final pathology on the colon lesion revealed a T4 N2 lesion with 5 of 21 sampled lymph nodes being positive for micrometastatic disease.  Additionally perineural and perivascular invasion was seen with 2 tumor deposits in the pericolonic fat.  Seen urology and had his catheter and indwelling double-J stent on the right removed. He is having some spasms related to that. He is otherwise doing well with no significant incisional pain. He is tolerating diet and moving his bowels. He denies rectal bleeding or temperature elevation.   Pending sigmoidoscopy under sedation to assess his colorectal anastomosis by Dr. Alegre   PMHx: None prior, recent diagnosis ight lateral wall bladder tumor, transurethral resection Invasive urothelial carcinoma PSx: Hernia repair FHx:No family history of blood or cancer  Social Hx: Social etoh, denies tobacco or illicit drug use. Lives at home with wife dudley and 2 children (Danni - Daughter 25, Mate - son 23). 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 Admits to YVETTE on his fingertips that last 7-8 days and subsided afterwards. No LE involvement with mild PO involvement. No restrictions with using his hands or foot  5/13/24 C3 XELOX today. Oxaliplatin 130 mg/m2. Doing well on treatment. Admits to YVETTE that last ~ 9-10 days after his last cycle which abated afterwards. Noticed on his fingertips with no LE involvement. Denies it currently and is able to use his hands and foot without any restrictions. On days of treatment notices transient "jaw lock ness" when he bites and chews. No issues otherwise. Working on days of treatment and during his off week. Will repeat evelyn lyons during next cycle. He did not undergo colonoscopy and will require one 6 months after surgery. He is due soon for sigmoidiscopy with Dr. Alegre.  6/24/24 C5 XELOX Oxaliplatin 100 mg/m2 He will have a total of 590 mg/m2 after this cycle. Patient did well after C#4. Noticed YVETTE on his hands x 7 days. Intensity in worse on days of treatment and improves afterwards Denies neuropathy currently.  Able to eat ice cream on his off week. Admits to dysgeusia. States the taste in his mouth is metallic but subsides during his off week. No PO or LE involvement.  8/6/24 Last cycle of Oxaliplatin was on 6/24/24 - 5 cycles of Oxaliplatin. Currently on C#7 D#2 of Xeloda tablets. 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 Neuropathy is triggered by walking and exercising. It is constant on his hands.  He is able to text on his phone and button his jacket. No loss of balance. No issues on PO Xeloda. He is working full time. [facility]      [de-identified] : Adenocarcinoma  [de-identified] : CEA 1.5 (1/25/24) [de-identified] : Colorectal Surgery: Aquilino Alegre Urology: Harleen Malcolm PCP: Kamala Quijano GI: Min CLOTILDECurt Andrade  Patient cell [phone] Wife Rafaela [phone] Daughter Danni [phone] Son Allen

## 2024-08-06 NOTE — HISTORY OF PRESENT ILLNESS
[Disease: _____________________] : Disease: [unfilled] [T: ___] : T[unfilled] [N: ___] : N[unfilled] [M: ___] : M[unfilled] [de-identified] : Mr. Tiwari is a 59 year old male presenting to the office for an initial consultation of colon CA to discuss adjuvant treatment.   Had onset of a change in bowel habits (ribbon like), rectal bleeding ~ one month ago. Seen by GI. Jan 9, 2024 underwent initial screening colonoscopy revealing a malignant appearing lesion of the distal sigmoid the bottom edge of which was 15 cm from the anal verge which was biopsied. The colonoscope could not traverse the lesion. He underwent transurethral resection of a bladder tumor and an anterior resection. He was found to have a bladder lesion and a known biopsy-proven adenocarcinoma of the colon. Final pathology on the bladder tumor revealed an element of a high-grade lesion but no muscular invasion.   The final pathology on the colon lesion revealed a T4 N2 lesion with 5 of 21 sampled lymph nodes being positive for micrometastatic disease.  Additionally perineural and perivascular invasion was seen with 2 tumor deposits in the pericolonic fat.  Seen urology and had his catheter and indwelling double-J stent on the right removed. He is having some spasms related to that. He is otherwise doing well with no significant incisional pain. He is tolerating diet and moving his bowels. He denies rectal bleeding or temperature elevation.   Pending sigmoidoscopy under sedation to assess his colorectal anastomosis by Dr. Alegre   PMHx: None prior, recent diagnosis ight lateral wall bladder tumor, transurethral resection Invasive urothelial carcinoma PSx: Hernia repair FHx:No family history of blood or cancer  Social Hx: Social etoh, denies tobacco or illicit drug use. Lives at home with wife dudley and 2 children (Danni - Daughter 25, Mate - son 23). 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 Admits to YVETTE on his fingertips that last 7-8 days and subsided afterwards. No LE involvement with mild PO involvement. No restrictions with using his hands or foot  5/13/24 C3 XELOX today. Oxaliplatin 130 mg/m2. Doing well on treatment. Admits to YVETTE that last ~ 9-10 days after his last cycle which abated afterwards. Noticed on his fingertips with no LE involvement. Denies it currently and is able to use his hands and foot without any restrictions. On days of treatment notices transient "jaw lock ness" when he bites and chews. No issues otherwise. Working on days of treatment and during his off week. Will repeat evelyn lyons during next cycle. He did not undergo colonoscopy and will require one 6 months after surgery. He is due soon for sigmoidiscopy with Dr. Alegre.  6/24/24 C5 XELOX Oxaliplatin 100 mg/m2 He will have a total of 590 mg/m2 after this cycle. Patient did well after C#4. Noticed YVETTE on his hands x 7 days. Intensity in worse on days of treatment and improves afterwards Denies neuropathy currently.  Able to eat ice cream on his off week. Admits to dysgeusia. States the taste in his mouth is metallic but subsides during his off week. No PO or LE involvement.  8/6/24 Last cycle of Oxaliplatin was on 6/24/24 - 5 cycles of Oxaliplatin. Currently on C#7 D#2 of Xeloda tablets. 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 Neuropathy is triggered by walking and exercising. It is constant on his hands.  He is able to text on his phone and button his jacket. No loss of balance. No issues on PO Xeloda. He is working full time. [facility]      [de-identified] : Adenocarcinoma  [de-identified] : CEA 1.5 (1/25/24) [de-identified] : Colorectal Surgery: Aquilino Alegre Urology: Harleen Malcolm PCP: Kamala Quijano GI: Min CLOTILDECurt Andrade  Patient cell [phone] Wife Rafaela [phone] Daughter Danni [phone] Son Allen

## 2024-08-06 NOTE — BEGINNING OF VISIT
[0] : 2) Feeling down, depressed, or hopeless: Not at all (0) [PHQ-2 Negative] : PHQ-2 Negative [PHQ-9 Negative] : PHQ-9 Negative [Pain Scale: ___] : On a scale of 1-10, today the patient's pain is a(n) [unfilled]. [Never] : Never [Date Discussed (MM/DD/YY): ___] : Discussed: [unfilled] [With Patient/Caregiver] : with Patient/Caregiver

## 2024-09-18 ENCOUNTER — APPOINTMENT (OUTPATIENT)
Dept: COLORECTAL SURGERY | Facility: CLINIC | Age: 59
End: 2024-09-18
Payer: COMMERCIAL

## 2024-09-18 PROCEDURE — 99212 OFFICE O/P EST SF 10 MIN: CPT

## 2024-09-18 NOTE — HISTORY OF PRESENT ILLNESS
[FreeTextEntry1] : Very pleasant 59-year-old gentleman who presents for follow-up visit.  Approximately 8 to 9 months ago I resected this gentleman for a carcinoma of the rectosigmoid.  He is receiving adjuvant chemotherapy.  He was also simultaneously found to have a sizable bladder tumor which was resected transurethrally and he has been receiving bladder installations.  Patient will schedule a repeat cystoscopy with his urologist this month.  He is completing his last cycle of Xeloda this month and will be done with all chemotherapy for the rectosigmoid lesion by October 2024.  I want him to schedule a follow-up sigmoidoscopy under sedation to rule out local recurrence as his last scope was 3 months ago

## 2024-09-30 ENCOUNTER — APPOINTMENT (OUTPATIENT)
Dept: COLORECTAL SURGERY | Facility: CLINIC | Age: 59
End: 2024-09-30
Payer: COMMERCIAL

## 2024-09-30 ENCOUNTER — OUTPATIENT (OUTPATIENT)
Dept: OUTPATIENT SERVICES | Facility: HOSPITAL | Age: 59
LOS: 1 days | Discharge: HOME | End: 2024-09-30

## 2024-09-30 DIAGNOSIS — Z98.890 OTHER SPECIFIED POSTPROCEDURAL STATES: Chronic | ICD-10-CM

## 2024-09-30 DIAGNOSIS — C18.9 MALIGNANT NEOPLASM OF COLON, UNSPECIFIED: ICD-10-CM

## 2024-09-30 DIAGNOSIS — Z90.49 ACQUIRED ABSENCE OF OTHER SPECIFIED PARTS OF DIGESTIVE TRACT: Chronic | ICD-10-CM

## 2024-09-30 PROCEDURE — 45331Z: CUSTOM

## 2024-10-03 ENCOUNTER — NON-APPOINTMENT (OUTPATIENT)
Age: 59
End: 2024-10-03

## 2024-10-09 LAB — CORE LAB BIOPSY: NORMAL

## 2024-10-10 ENCOUNTER — RESULT REVIEW (OUTPATIENT)
Age: 59
End: 2024-10-10

## 2024-10-10 ENCOUNTER — APPOINTMENT (OUTPATIENT)
Dept: INFUSION THERAPY | Facility: HOSPITAL | Age: 59
End: 2024-10-10

## 2024-10-10 ENCOUNTER — APPOINTMENT (OUTPATIENT)
Dept: HEMATOLOGY ONCOLOGY | Facility: CLINIC | Age: 59
End: 2024-10-10
Payer: COMMERCIAL

## 2024-10-10 VITALS
DIASTOLIC BLOOD PRESSURE: 79 MMHG | HEART RATE: 75 BPM | BODY MASS INDEX: 25.09 KG/M2 | TEMPERATURE: 97.5 F | HEIGHT: 66.5 IN | RESPIRATION RATE: 16 BRPM | SYSTOLIC BLOOD PRESSURE: 155 MMHG | WEIGHT: 158 LBS | OXYGEN SATURATION: 99 %

## 2024-10-10 DIAGNOSIS — C18.9 MALIGNANT NEOPLASM OF COLON, UNSPECIFIED: ICD-10-CM

## 2024-10-10 LAB
ALBUMIN SERPL ELPH-MCNC: 4.5 G/DL — SIGNIFICANT CHANGE UP (ref 3.3–5)
ALP SERPL-CCNC: 69 U/L — SIGNIFICANT CHANGE UP (ref 40–120)
ALT FLD-CCNC: 21 U/L — SIGNIFICANT CHANGE UP (ref 10–45)
ANION GAP SERPL CALC-SCNC: 14 MMOL/L — SIGNIFICANT CHANGE UP (ref 5–17)
AST SERPL-CCNC: 38 U/L — SIGNIFICANT CHANGE UP (ref 10–40)
BASOPHILS # BLD AUTO: 0.05 K/UL — SIGNIFICANT CHANGE UP (ref 0–0.2)
BASOPHILS NFR BLD AUTO: 0.6 % — SIGNIFICANT CHANGE UP (ref 0–2)
BILIRUB SERPL-MCNC: 0.3 MG/DL — SIGNIFICANT CHANGE UP (ref 0.2–1.2)
BUN SERPL-MCNC: 22 MG/DL — SIGNIFICANT CHANGE UP (ref 7–23)
CALCIUM SERPL-MCNC: 10.1 MG/DL — SIGNIFICANT CHANGE UP (ref 8.4–10.5)
CHLORIDE SERPL-SCNC: 100 MMOL/L — SIGNIFICANT CHANGE UP (ref 96–108)
CO2 SERPL-SCNC: 23 MMOL/L — SIGNIFICANT CHANGE UP (ref 22–31)
CREAT SERPL-MCNC: 1.13 MG/DL — SIGNIFICANT CHANGE UP (ref 0.5–1.3)
EGFR: 74 ML/MIN/1.73M2 — SIGNIFICANT CHANGE UP
EOSINOPHIL # BLD AUTO: 0.15 K/UL — SIGNIFICANT CHANGE UP (ref 0–0.5)
EOSINOPHIL NFR BLD AUTO: 1.8 % — SIGNIFICANT CHANGE UP (ref 0–6)
GLUCOSE SERPL-MCNC: 89 MG/DL — SIGNIFICANT CHANGE UP (ref 70–99)
HCT VFR BLD CALC: 41.7 % — SIGNIFICANT CHANGE UP (ref 39–50)
HGB BLD-MCNC: 14 G/DL — SIGNIFICANT CHANGE UP (ref 13–17)
IMM GRANULOCYTES NFR BLD AUTO: 0.5 % — SIGNIFICANT CHANGE UP (ref 0–0.9)
LYMPHOCYTES # BLD AUTO: 1.95 K/UL — SIGNIFICANT CHANGE UP (ref 1–3.3)
LYMPHOCYTES # BLD AUTO: 22.9 % — SIGNIFICANT CHANGE UP (ref 13–44)
MCHC RBC-ENTMCNC: 31 PG — SIGNIFICANT CHANGE UP (ref 27–34)
MCHC RBC-ENTMCNC: 33.6 G/DL — SIGNIFICANT CHANGE UP (ref 32–36)
MCV RBC AUTO: 92.3 FL — SIGNIFICANT CHANGE UP (ref 80–100)
MONOCYTES # BLD AUTO: 0.84 K/UL — SIGNIFICANT CHANGE UP (ref 0–0.9)
MONOCYTES NFR BLD AUTO: 9.9 % — SIGNIFICANT CHANGE UP (ref 2–14)
NEUTROPHILS # BLD AUTO: 5.47 K/UL — SIGNIFICANT CHANGE UP (ref 1.8–7.4)
NEUTROPHILS NFR BLD AUTO: 64.3 % — SIGNIFICANT CHANGE UP (ref 43–77)
NRBC # BLD: 0 /100 WBCS — SIGNIFICANT CHANGE UP (ref 0–0)
PLATELET # BLD AUTO: 376 K/UL — SIGNIFICANT CHANGE UP (ref 150–400)
POTASSIUM SERPL-MCNC: 4.5 MMOL/L — SIGNIFICANT CHANGE UP (ref 3.5–5.3)
POTASSIUM SERPL-SCNC: 4.5 MMOL/L — SIGNIFICANT CHANGE UP (ref 3.5–5.3)
PROT SERPL-MCNC: 7.6 G/DL — SIGNIFICANT CHANGE UP (ref 6–8.3)
RBC # BLD: 4.52 M/UL — SIGNIFICANT CHANGE UP (ref 4.2–5.8)
RBC # FLD: 16.4 % — HIGH (ref 10.3–14.5)
SODIUM SERPL-SCNC: 137 MMOL/L — SIGNIFICANT CHANGE UP (ref 135–145)
TESTOST SERPL-MCNC: 629 NG/DL
WBC # BLD: 8.5 K/UL — SIGNIFICANT CHANGE UP (ref 3.8–10.5)
WBC # FLD AUTO: 8.5 K/UL — SIGNIFICANT CHANGE UP (ref 3.8–10.5)

## 2024-10-10 PROCEDURE — 99214 OFFICE O/P EST MOD 30 MIN: CPT

## 2024-10-10 PROCEDURE — G2211 COMPLEX E/M VISIT ADD ON: CPT

## 2024-10-11 LAB — CEA SERPL-MCNC: 1.6 NG/ML — SIGNIFICANT CHANGE UP (ref 0–3.8)

## 2024-10-23 ENCOUNTER — APPOINTMENT (OUTPATIENT)
Dept: UROLOGY | Facility: CLINIC | Age: 59
End: 2024-10-23
Payer: COMMERCIAL

## 2024-10-23 DIAGNOSIS — C67.8 MALIGNANT NEOPLASM OF OVERLAPPING SITES OF BLADDER: ICD-10-CM

## 2024-10-23 LAB
APPEARANCE: CLEAR
BACTERIA: NEGATIVE /HPF
BILIRUBIN URINE: NEGATIVE
BLOOD URINE: NEGATIVE
CAST: 0 /LPF
COLOR: YELLOW
EPITHELIAL CELLS: 0 /HPF
GLUCOSE QUALITATIVE U: NEGATIVE MG/DL
KETONES URINE: NEGATIVE MG/DL
LEUKOCYTE ESTERASE URINE: NEGATIVE
MICROSCOPIC-UA: NORMAL
NITRITE URINE: NEGATIVE
PH URINE: 6
PROTEIN URINE: NEGATIVE MG/DL
RED BLOOD CELLS URINE: 0 /HPF
SPECIFIC GRAVITY URINE: 1.01
UROBILINOGEN URINE: 0.2 MG/DL
WHITE BLOOD CELLS URINE: 0 /HPF

## 2024-10-23 PROCEDURE — 52214 CYSTOSCOPY AND TREATMENT: CPT

## 2024-10-23 PROCEDURE — 99213 OFFICE O/P EST LOW 20 MIN: CPT | Mod: 25

## 2024-10-25 LAB — CORE LAB BIOPSY: NORMAL

## 2024-10-29 ENCOUNTER — NON-APPOINTMENT (OUTPATIENT)
Age: 59
End: 2024-10-29

## 2024-10-30 LAB
BACTERIA UR CULT: NORMAL
URINE CYTOLOGY: NORMAL

## 2025-01-15 ENCOUNTER — APPOINTMENT (OUTPATIENT)
Dept: UROLOGY | Facility: CLINIC | Age: 60
End: 2025-01-15

## 2025-01-24 ENCOUNTER — NON-APPOINTMENT (OUTPATIENT)
Age: 60
End: 2025-01-24

## 2025-01-24 ENCOUNTER — APPOINTMENT (OUTPATIENT)
Dept: UROLOGY | Facility: CLINIC | Age: 60
End: 2025-01-24
Payer: COMMERCIAL

## 2025-01-24 VITALS
RESPIRATION RATE: 17 BRPM | WEIGHT: 158 LBS | DIASTOLIC BLOOD PRESSURE: 95 MMHG | HEIGHT: 66.5 IN | HEART RATE: 92 BPM | BODY MASS INDEX: 25.09 KG/M2 | SYSTOLIC BLOOD PRESSURE: 167 MMHG

## 2025-01-24 DIAGNOSIS — C67.8 MALIGNANT NEOPLASM OF OVERLAPPING SITES OF BLADDER: ICD-10-CM

## 2025-01-24 PROCEDURE — 52000 CYSTOURETHROSCOPY: CPT

## 2025-01-24 PROCEDURE — 99214 OFFICE O/P EST MOD 30 MIN: CPT | Mod: 25

## 2025-01-25 LAB
APPEARANCE: CLEAR
BACTERIA UR CULT: NORMAL
BACTERIA: NEGATIVE /HPF
BILIRUBIN URINE: NEGATIVE
BLOOD URINE: NEGATIVE
CAST: 2 /LPF
COLOR: YELLOW
EPITHELIAL CELLS: 0 /HPF
GLUCOSE QUALITATIVE U: NEGATIVE MG/DL
KETONES URINE: NEGATIVE MG/DL
LEUKOCYTE ESTERASE URINE: ABNORMAL
MICROSCOPIC-UA: NORMAL
NITRITE URINE: NEGATIVE
PH URINE: 6.5
PROTEIN URINE: 30 MG/DL
RED BLOOD CELLS URINE: 2 /HPF
SPECIFIC GRAVITY URINE: 1.02
UROBILINOGEN URINE: 1 MG/DL
WHITE BLOOD CELLS URINE: 0 /HPF

## 2025-01-27 ENCOUNTER — APPOINTMENT (OUTPATIENT)
Dept: COLORECTAL SURGERY | Facility: CLINIC | Age: 60
End: 2025-01-27

## 2025-01-27 LAB — URINE CYTOLOGY: NORMAL

## 2025-01-31 ENCOUNTER — OUTPATIENT (OUTPATIENT)
Dept: OUTPATIENT SERVICES | Facility: HOSPITAL | Age: 60
LOS: 1 days | Discharge: HOME | End: 2025-01-31

## 2025-01-31 DIAGNOSIS — C18.9 MALIGNANT NEOPLASM OF COLON, UNSPECIFIED: ICD-10-CM

## 2025-01-31 DIAGNOSIS — Z90.49 ACQUIRED ABSENCE OF OTHER SPECIFIED PARTS OF DIGESTIVE TRACT: Chronic | ICD-10-CM

## 2025-01-31 DIAGNOSIS — Z98.890 OTHER SPECIFIED POSTPROCEDURAL STATES: Chronic | ICD-10-CM

## 2025-02-05 ENCOUNTER — RESULT REVIEW (OUTPATIENT)
Age: 60
End: 2025-02-05

## 2025-02-05 ENCOUNTER — APPOINTMENT (OUTPATIENT)
Dept: INFUSION THERAPY | Facility: HOSPITAL | Age: 60
End: 2025-02-05

## 2025-02-05 LAB
ALBUMIN SERPL ELPH-MCNC: 4.4 G/DL — SIGNIFICANT CHANGE UP (ref 3.3–5)
ALP SERPL-CCNC: 60 U/L — SIGNIFICANT CHANGE UP (ref 40–120)
ALT FLD-CCNC: 27 U/L — SIGNIFICANT CHANGE UP (ref 10–45)
ANION GAP SERPL CALC-SCNC: 13 MMOL/L — SIGNIFICANT CHANGE UP (ref 5–17)
AST SERPL-CCNC: 34 U/L — SIGNIFICANT CHANGE UP (ref 10–40)
BASOPHILS # BLD AUTO: 0.05 K/UL — SIGNIFICANT CHANGE UP (ref 0–0.2)
BASOPHILS NFR BLD AUTO: 0.7 % — SIGNIFICANT CHANGE UP (ref 0–2)
BILIRUB SERPL-MCNC: 0.2 MG/DL — SIGNIFICANT CHANGE UP (ref 0.2–1.2)
BUN SERPL-MCNC: 23 MG/DL — SIGNIFICANT CHANGE UP (ref 7–23)
CALCIUM SERPL-MCNC: 10 MG/DL — SIGNIFICANT CHANGE UP (ref 8.4–10.5)
CHLORIDE SERPL-SCNC: 104 MMOL/L — SIGNIFICANT CHANGE UP (ref 96–108)
CO2 SERPL-SCNC: 23 MMOL/L — SIGNIFICANT CHANGE UP (ref 22–31)
CREAT SERPL-MCNC: 1.07 MG/DL — SIGNIFICANT CHANGE UP (ref 0.5–1.3)
EGFR: 79 ML/MIN/1.73M2 — SIGNIFICANT CHANGE UP
EGFR: 79 ML/MIN/1.73M2 — SIGNIFICANT CHANGE UP
EOSINOPHIL # BLD AUTO: 0.11 K/UL — SIGNIFICANT CHANGE UP (ref 0–0.5)
EOSINOPHIL NFR BLD AUTO: 1.6 % — SIGNIFICANT CHANGE UP (ref 0–6)
GLUCOSE SERPL-MCNC: 103 MG/DL — HIGH (ref 70–99)
HCT VFR BLD CALC: 44.6 % — SIGNIFICANT CHANGE UP (ref 39–50)
HGB BLD-MCNC: 14.7 G/DL — SIGNIFICANT CHANGE UP (ref 13–17)
IMM GRANULOCYTES NFR BLD AUTO: 0.3 % — SIGNIFICANT CHANGE UP (ref 0–0.9)
LYMPHOCYTES # BLD AUTO: 1.38 K/UL — SIGNIFICANT CHANGE UP (ref 1–3.3)
LYMPHOCYTES # BLD AUTO: 20.6 % — SIGNIFICANT CHANGE UP (ref 13–44)
MCHC RBC-ENTMCNC: 28.9 PG — SIGNIFICANT CHANGE UP (ref 27–34)
MCHC RBC-ENTMCNC: 33 G/DL — SIGNIFICANT CHANGE UP (ref 32–36)
MCV RBC AUTO: 87.6 FL — SIGNIFICANT CHANGE UP (ref 80–100)
MONOCYTES # BLD AUTO: 0.52 K/UL — SIGNIFICANT CHANGE UP (ref 0–0.9)
MONOCYTES NFR BLD AUTO: 7.8 % — SIGNIFICANT CHANGE UP (ref 2–14)
NEUTROPHILS # BLD AUTO: 4.61 K/UL — SIGNIFICANT CHANGE UP (ref 1.8–7.4)
NEUTROPHILS NFR BLD AUTO: 69 % — SIGNIFICANT CHANGE UP (ref 43–77)
NRBC # BLD: 0 /100 WBCS — SIGNIFICANT CHANGE UP (ref 0–0)
NRBC BLD-RTO: 0 /100 WBCS — SIGNIFICANT CHANGE UP (ref 0–0)
PLATELET # BLD AUTO: 396 K/UL — SIGNIFICANT CHANGE UP (ref 150–400)
POTASSIUM SERPL-MCNC: 4.7 MMOL/L — SIGNIFICANT CHANGE UP (ref 3.5–5.3)
POTASSIUM SERPL-SCNC: 4.7 MMOL/L — SIGNIFICANT CHANGE UP (ref 3.5–5.3)
PROT SERPL-MCNC: 7.5 G/DL — SIGNIFICANT CHANGE UP (ref 6–8.3)
RBC # BLD: 5.09 M/UL — SIGNIFICANT CHANGE UP (ref 4.2–5.8)
RBC # FLD: 13 % — SIGNIFICANT CHANGE UP (ref 10.3–14.5)
SODIUM SERPL-SCNC: 140 MMOL/L — SIGNIFICANT CHANGE UP (ref 135–145)
WBC # BLD: 6.69 K/UL — SIGNIFICANT CHANGE UP (ref 3.8–10.5)
WBC # FLD AUTO: 6.69 K/UL — SIGNIFICANT CHANGE UP (ref 3.8–10.5)

## 2025-02-06 LAB — CEA SERPL-MCNC: 1 NG/ML — SIGNIFICANT CHANGE UP (ref 0–3.8)

## 2025-02-13 ENCOUNTER — OUTPATIENT (OUTPATIENT)
Dept: OUTPATIENT SERVICES | Facility: HOSPITAL | Age: 60
LOS: 1 days | End: 2025-02-13
Payer: COMMERCIAL

## 2025-02-13 VITALS
SYSTOLIC BLOOD PRESSURE: 152 MMHG | HEIGHT: 67 IN | WEIGHT: 158.07 LBS | RESPIRATION RATE: 14 BRPM | HEART RATE: 75 BPM | DIASTOLIC BLOOD PRESSURE: 84 MMHG | OXYGEN SATURATION: 98 % | TEMPERATURE: 98 F

## 2025-02-13 DIAGNOSIS — Z98.890 OTHER SPECIFIED POSTPROCEDURAL STATES: Chronic | ICD-10-CM

## 2025-02-13 DIAGNOSIS — C67.8 MALIGNANT NEOPLASM OF OVERLAPPING SITES OF BLADDER: ICD-10-CM

## 2025-02-13 DIAGNOSIS — Z90.49 ACQUIRED ABSENCE OF OTHER SPECIFIED PARTS OF DIGESTIVE TRACT: Chronic | ICD-10-CM

## 2025-02-13 DIAGNOSIS — Z01.818 ENCOUNTER FOR OTHER PREPROCEDURAL EXAMINATION: ICD-10-CM

## 2025-02-13 PROCEDURE — 87086 URINE CULTURE/COLONY COUNT: CPT

## 2025-02-13 PROCEDURE — G0463: CPT

## 2025-02-13 RX ORDER — LIDOCAINE HCL/PF 10 MG/ML
0.2 VIAL (ML) INJECTION ONCE
Refills: 0 | Status: DISCONTINUED | OUTPATIENT
Start: 2025-03-06 | End: 2025-03-06

## 2025-02-13 RX ORDER — CEFAZOLIN SODIUM IN 0.9 % NACL 3 G/100 ML
2000 INTRAVENOUS SOLUTION, PIGGYBACK (ML) INTRAVENOUS ONCE
Refills: 0 | Status: COMPLETED | OUTPATIENT
Start: 2025-03-06 | End: 2025-03-06

## 2025-02-13 NOTE — H&P PST ADULT - NSICDXPASTMEDICALHX_GEN_ALL_CORE_FT
PAST MEDICAL HISTORY:  Colon cancer     H/O inguinal hernia     Malignant neoplasm of overlapping sites of bladder

## 2025-02-13 NOTE — H&P PST ADULT - HISTORY OF PRESENT ILLNESS
59 yo male diagnosed with adenocarcinoma S/P  Laparoscopic lower anterior resection on 1/30/24 with Dr. Aquilino Alegre and a Transurethral resection of large bladder tumor by Dr. Malcolm. He underwent chemotherapy via right chest wall Mediport (last dose in 10/24, followed by Onc). Currently denies any symptoms including  hematuria or dysuria. Pt is now s/p Cystoscopy, Bladder biopsy fulguration on 7/11/24 with Dr. Malcolm (states results were negative). Now, states he was seen in the office for follow up a few weeks back and more lesions were seen. He now presents to PST for a scheduled Cystoscopy w/ Bladder Tumor Removal on 3/6/2025. No other significant PMH. Currently feels well and denies cough, fever, SOB, CP.

## 2025-02-13 NOTE — H&P PST ADULT - NSICDXFAMILYHX_GEN_ALL_CORE_FT
ambulatory
FAMILY HISTORY:  Father  Still living? Unknown  FH: HTN (hypertension), Age at diagnosis: Age Unknown  FH: type 2 diabetes, Age at diagnosis: Age Unknown    Mother  Still living? Unknown  FH: breast cancer, Age at diagnosis: Age Unknown    Sibling  Still living? Unknown  FHx: congenital heart disease, Age at diagnosis: Age Unknown

## 2025-02-13 NOTE — H&P PST ADULT - NSICDXPASTSURGICALHX_GEN_ALL_CORE_FT
PAST SURGICAL HISTORY:  H/O colonoscopy     H/O inguinal hernia repair     H/O sigmoidoscopy     History of low anterior resection of rectum     History of transurethral resection of bladder tumor (TURBT)

## 2025-02-19 ENCOUNTER — NON-APPOINTMENT (OUTPATIENT)
Age: 60
End: 2025-02-19

## 2025-02-19 ENCOUNTER — APPOINTMENT (OUTPATIENT)
Dept: COLORECTAL SURGERY | Facility: CLINIC | Age: 60
End: 2025-02-19
Payer: COMMERCIAL

## 2025-02-19 PROCEDURE — 45380 COLONOSCOPY AND BIOPSY: CPT

## 2025-02-26 ENCOUNTER — RESULT REVIEW (OUTPATIENT)
Age: 60
End: 2025-02-26

## 2025-02-26 LAB — CORE LAB BIOPSY: NORMAL

## 2025-02-28 ENCOUNTER — RESULT REVIEW (OUTPATIENT)
Age: 60
End: 2025-02-28

## 2025-03-06 ENCOUNTER — RESULT REVIEW (OUTPATIENT)
Age: 60
End: 2025-03-06

## 2025-03-06 ENCOUNTER — OUTPATIENT (OUTPATIENT)
Dept: INPATIENT UNIT | Facility: HOSPITAL | Age: 60
LOS: 1 days | End: 2025-03-06
Payer: COMMERCIAL

## 2025-03-06 ENCOUNTER — APPOINTMENT (OUTPATIENT)
Dept: UROLOGY | Facility: HOSPITAL | Age: 60
End: 2025-03-06

## 2025-03-06 ENCOUNTER — NON-APPOINTMENT (OUTPATIENT)
Age: 60
End: 2025-03-06

## 2025-03-06 VITALS
OXYGEN SATURATION: 95 % | DIASTOLIC BLOOD PRESSURE: 67 MMHG | HEART RATE: 66 BPM | SYSTOLIC BLOOD PRESSURE: 127 MMHG | TEMPERATURE: 98 F | RESPIRATION RATE: 16 BRPM

## 2025-03-06 VITALS
WEIGHT: 158.07 LBS | SYSTOLIC BLOOD PRESSURE: 174 MMHG | TEMPERATURE: 98 F | HEIGHT: 67 IN | RESPIRATION RATE: 18 BRPM | DIASTOLIC BLOOD PRESSURE: 80 MMHG | HEART RATE: 71 BPM | OXYGEN SATURATION: 100 %

## 2025-03-06 DIAGNOSIS — Z98.890 OTHER SPECIFIED POSTPROCEDURAL STATES: Chronic | ICD-10-CM

## 2025-03-06 DIAGNOSIS — Z90.49 ACQUIRED ABSENCE OF OTHER SPECIFIED PARTS OF DIGESTIVE TRACT: Chronic | ICD-10-CM

## 2025-03-06 DIAGNOSIS — C67.8 MALIGNANT NEOPLASM OF OVERLAPPING SITES OF BLADDER: ICD-10-CM

## 2025-03-06 PROCEDURE — 88305 TISSUE EXAM BY PATHOLOGIST: CPT | Mod: 26

## 2025-03-06 PROCEDURE — 52234 CYSTOSCOPY AND TREATMENT: CPT

## 2025-03-06 PROCEDURE — 88305 TISSUE EXAM BY PATHOLOGIST: CPT

## 2025-03-06 PROCEDURE — C9399: CPT

## 2025-03-06 PROCEDURE — 52240 CYSTOSCOPY AND TREATMENT: CPT

## 2025-03-06 RX ORDER — CIPROFLOXACIN HCL 250 MG
1 TABLET ORAL
Qty: 10 | Refills: 0
Start: 2025-03-06 | End: 2025-03-10

## 2025-03-06 RX ORDER — HYDROMORPHONE/SOD CHLOR,ISO/PF 2 MG/10 ML
0.5 SYRINGE (ML) INJECTION
Refills: 0 | Status: DISCONTINUED | OUTPATIENT
Start: 2025-03-06 | End: 2025-03-06

## 2025-03-06 RX ORDER — HYDROMORPHONE/SOD CHLOR,ISO/PF 2 MG/10 ML
1 SYRINGE (ML) INJECTION
Refills: 0 | Status: DISCONTINUED | OUTPATIENT
Start: 2025-03-06 | End: 2025-03-06

## 2025-03-06 NOTE — ASU DISCHARGE PLAN (ADULT/PEDIATRIC) - FINANCIAL ASSISTANCE
[facility] provides services at a reduced cost to those who are determined to be eligible through [facility]’s financial assistance program. Information regarding [facility]’s financial assistance program can be found by going to [email or web address] or by calling [phone].

## 2025-03-06 NOTE — PRE-ANESTHESIA EVALUATION ADULT - NSANTHOSAYNRD_GEN_A_CORE
Quality 358: Patient-Centered Surgical Risk Assessment And Communication: Documentation of patient-specific risk assessment with a risk calculator based on multi-institutional clinical data, the specific risk calculator used, and communication of risk assessment from risk calculator with the patient or family. Detail Level: Detailed Quality 110: Preventive Care And Screening: Influenza Immunization: Influenza immunization was not ordered or administered, reason not given No. ANIBAL screening performed.  STOP BANG Legend: 0-2 = LOW Risk; 3-4 = INTERMEDIATE Risk; 5-8 = HIGH Risk Quality 431: Preventive Care And Screening: Unhealthy Alcohol Use - Screening: Patient screened for unhealthy alcohol use using a single question and scores less than 2 times per year Quality 130: Documentation Of Current Medications In The Medical Record: Current Medications Documented Quality 400a: One-Time Screening For Hepatitis C Virus (Hcv) For All Patients: Patient received one-time screening for HCV infection Quality 265: Biopsy Follow-Up: Biopsy results reviewed, communicated, tracked, and documented Quality 226: Preventive Care And Screening: Tobacco Use: Screening And Cessation Intervention: Patient screened for tobacco use and is an ex/non-smoker

## 2025-03-06 NOTE — ASU PREOP CHECKLIST - 1.
Received a call from patient during afterhours returning a call looking to schedule an appt. Helped to schedule based on the referral.     Patient was appreciative. emotional support and preop teaching provided to pt and family.

## 2025-03-06 NOTE — ASU DISCHARGE PLAN (ADULT/PEDIATRIC) - ASU DC SPECIAL INSTRUCTIONSFT
CATHETER: You are sent home with a morales catheter, the nurses will review instructions and care before you go home.   GENERAL: It is common to have blood in your urine after your procedure. It may be pink or even red; inform your doctor if you have a significant amount of clot in the urine or if you are unable to void at all or if your catheter stops draining. The urine may clear and then become bloody again especially as you are more physically active. It is not uncommon to have some burning when you urinate, this will gradually improve. With a catheter in place, it is not uncommon to have occasional leakage or urine or blood around the catheter. Please call your urologist if this is excessive and/or the urine is not draining through the catheter into the bag.  BATHING: You may shower. If going home with morales, shower only until catheter is removed.  DIET: You may resume your regular diet and regular medication regimen.  PAIN: You may take Tylenol (acetaminophen) 650-975mg and/or Motrin (ibuprofen) 400-600mg, both available over the counter, for pain every 6 hours as needed. Do not exceed 4000mg of Tylenol (acetaminophen) daily. You may alternate these medications such that you take one or the other every 3 hours for around the clock pain coverage.  ANTIBIOTICS: You have been given a prescription for an antibiotic, please take this medication as instructed and be sure to complete the entire course.  STOOL SOFTENERS: Do not allow yourself to become constipated as straining may cause bleeding. Take stool softeners or a laxative (ex. Miralax, Colace, Senokot, ExLax, etc), available over the counter, if needed.  ACTIVITY: No heavy lifting or strenuous exercise until you are evaluated at your post-operative appointment. Otherwise, you may return to your usual level of physical activity.  FOLLOW-UP: If you did not already schedule your post-operative appointment, please call your urologist to schedule a follow-up appointment. Please follow-up with Dr. Malcolm on Monday for morales removal.   CALL YOUR UROLOGIST IF: You have any bleeding that does not stop, inability to void >8 hours, fever over 100.4 F, chills, persistent nausea/vomiting, changes in your incision concerning for infection, or if your pain is not controlled on your discharge pain medications.

## 2025-03-07 PROBLEM — C67.8 MALIGNANT NEOPLASM OF OVERLAPPING SITES OF BLADDER: Chronic | Status: ACTIVE | Noted: 2025-02-13

## 2025-03-10 ENCOUNTER — APPOINTMENT (OUTPATIENT)
Dept: UROLOGY | Facility: CLINIC | Age: 60
End: 2025-03-10
Payer: COMMERCIAL

## 2025-03-10 ENCOUNTER — OUTPATIENT (OUTPATIENT)
Dept: OUTPATIENT SERVICES | Facility: HOSPITAL | Age: 60
LOS: 1 days | End: 2025-03-10

## 2025-03-10 VITALS — HEART RATE: 77 BPM | SYSTOLIC BLOOD PRESSURE: 158 MMHG | DIASTOLIC BLOOD PRESSURE: 68 MMHG

## 2025-03-10 DIAGNOSIS — Z98.890 OTHER SPECIFIED POSTPROCEDURAL STATES: Chronic | ICD-10-CM

## 2025-03-10 DIAGNOSIS — Z90.49 ACQUIRED ABSENCE OF OTHER SPECIFIED PARTS OF DIGESTIVE TRACT: Chronic | ICD-10-CM

## 2025-03-10 DIAGNOSIS — R39.9 UNSPECIFIED SYMPTOMS AND SIGNS INVOLVING THE GENITOURINARY SYSTEM: ICD-10-CM

## 2025-03-10 DIAGNOSIS — R31.0 GROSS HEMATURIA: ICD-10-CM

## 2025-03-10 PROCEDURE — 99212 OFFICE O/P EST SF 10 MIN: CPT | Mod: 25

## 2025-03-10 PROCEDURE — 51700 IRRIGATION OF BLADDER: CPT

## 2025-03-11 ENCOUNTER — APPOINTMENT (OUTPATIENT)
Dept: UROLOGY | Facility: CLINIC | Age: 60
End: 2025-03-11

## 2025-03-11 DIAGNOSIS — R35.0 FREQUENCY OF MICTURITION: ICD-10-CM

## 2025-03-11 LAB — SURGICAL PATHOLOGY STUDY: SIGNIFICANT CHANGE UP

## 2025-03-18 ENCOUNTER — OUTPATIENT (OUTPATIENT)
Dept: OUTPATIENT SERVICES | Facility: HOSPITAL | Age: 60
LOS: 1 days | End: 2025-03-18
Payer: COMMERCIAL

## 2025-03-18 VITALS
RESPIRATION RATE: 16 BRPM | SYSTOLIC BLOOD PRESSURE: 154 MMHG | WEIGHT: 158.07 LBS | HEART RATE: 84 BPM | HEIGHT: 66 IN | OXYGEN SATURATION: 97 % | TEMPERATURE: 98 F | DIASTOLIC BLOOD PRESSURE: 83 MMHG

## 2025-03-18 DIAGNOSIS — K63.89 OTHER SPECIFIED DISEASES OF INTESTINE: ICD-10-CM

## 2025-03-18 DIAGNOSIS — Z98.890 OTHER SPECIFIED POSTPROCEDURAL STATES: Chronic | ICD-10-CM

## 2025-03-18 DIAGNOSIS — Z01.818 ENCOUNTER FOR OTHER PREPROCEDURAL EXAMINATION: ICD-10-CM

## 2025-03-18 DIAGNOSIS — Z90.49 ACQUIRED ABSENCE OF OTHER SPECIFIED PARTS OF DIGESTIVE TRACT: Chronic | ICD-10-CM

## 2025-03-18 LAB
ANION GAP SERPL CALC-SCNC: 15 MMOL/L — SIGNIFICANT CHANGE UP (ref 5–17)
BLD GP AB SCN SERPL QL: NEGATIVE — SIGNIFICANT CHANGE UP
BUN SERPL-MCNC: 20 MG/DL — SIGNIFICANT CHANGE UP (ref 7–23)
CALCIUM SERPL-MCNC: 10.1 MG/DL — SIGNIFICANT CHANGE UP (ref 8.4–10.5)
CHLORIDE SERPL-SCNC: 99 MMOL/L — SIGNIFICANT CHANGE UP (ref 96–108)
CO2 SERPL-SCNC: 23 MMOL/L — SIGNIFICANT CHANGE UP (ref 22–31)
CREAT SERPL-MCNC: 1.31 MG/DL — HIGH (ref 0.5–1.3)
EGFR: 62 ML/MIN/1.73M2 — SIGNIFICANT CHANGE UP
EGFR: 62 ML/MIN/1.73M2 — SIGNIFICANT CHANGE UP
GLUCOSE SERPL-MCNC: 94 MG/DL — SIGNIFICANT CHANGE UP (ref 70–99)
HCT VFR BLD CALC: 43.5 % — SIGNIFICANT CHANGE UP (ref 39–50)
HGB BLD-MCNC: 14.2 G/DL — SIGNIFICANT CHANGE UP (ref 13–17)
MCHC RBC-ENTMCNC: 32.6 G/DL — SIGNIFICANT CHANGE UP (ref 32–36)
MCV RBC AUTO: 86 FL — SIGNIFICANT CHANGE UP (ref 80–100)
NRBC BLD AUTO-RTO: 0 /100 WBCS — SIGNIFICANT CHANGE UP (ref 0–0)
PLATELET # BLD AUTO: 383 K/UL — SIGNIFICANT CHANGE UP (ref 150–400)
POTASSIUM SERPL-MCNC: 4.5 MMOL/L — SIGNIFICANT CHANGE UP (ref 3.5–5.3)
POTASSIUM SERPL-SCNC: 4.5 MMOL/L — SIGNIFICANT CHANGE UP (ref 3.5–5.3)
RBC # BLD: 5.06 M/UL — SIGNIFICANT CHANGE UP (ref 4.2–5.8)
RBC # FLD: 13.3 % — SIGNIFICANT CHANGE UP (ref 10.3–14.5)
RH IG SCN BLD-IMP: POSITIVE — SIGNIFICANT CHANGE UP
SODIUM SERPL-SCNC: 137 MMOL/L — SIGNIFICANT CHANGE UP (ref 135–145)
WBC # BLD: 7.88 K/UL — SIGNIFICANT CHANGE UP (ref 3.8–10.5)

## 2025-03-18 PROCEDURE — 85027 COMPLETE CBC AUTOMATED: CPT

## 2025-03-18 PROCEDURE — 86900 BLOOD TYPING SEROLOGIC ABO: CPT

## 2025-03-18 PROCEDURE — 80048 BASIC METABOLIC PNL TOTAL CA: CPT

## 2025-03-18 PROCEDURE — 86901 BLOOD TYPING SEROLOGIC RH(D): CPT

## 2025-03-18 PROCEDURE — 86850 RBC ANTIBODY SCREEN: CPT

## 2025-03-18 PROCEDURE — G0463: CPT

## 2025-03-18 PROCEDURE — 83036 HEMOGLOBIN GLYCOSYLATED A1C: CPT

## 2025-03-18 RX ORDER — CEFAZOLIN SODIUM IN 0.9 % NACL 3 G/100 ML
2000 INTRAVENOUS SOLUTION, PIGGYBACK (ML) INTRAVENOUS ONCE
Refills: 0 | Status: COMPLETED | OUTPATIENT
Start: 2025-03-25 | End: 2025-03-25

## 2025-03-18 NOTE — H&P PST ADULT - PROBLEM SELECTOR PLAN 1
Scheduled for ERP Open anterior resection, Possible Ileostomy on 3/25/25 with Dr. Alegre.  Pre-operative instructions, ISS  and chlorhexidine given.  Labs: cbc, bmp, T/S,A1c drawn in PST  DOS: Fingerstick, ABO

## 2025-03-18 NOTE — H&P PST ADULT - NSICDXPASTSURGICALHX_GEN_ALL_CORE_FT
PAST SURGICAL HISTORY:  H/O colonoscopy     H/O inguinal hernia repair     H/O sigmoidoscopy     History of low anterior resection of rectum     History of transurethral resection of bladder tumor (TURBT)      PAST SURGICAL HISTORY:  H/O colonoscopy     H/O cystoscopy     H/O inguinal hernia repair     H/O sigmoidoscopy     History of low anterior resection of rectum     History of transurethral resection of bladder tumor (TURBT)     S/P bladder tumor excision with fulguration

## 2025-03-18 NOTE — H&P PST ADULT - ASSESSMENT
DASI score:  DASI activity: Gym 4x a week, lifting weights and cardio   Loose teeth or denture: Denies    DASI score:9.89  DASI activity: Gym 4x a week, lifting weights and cardio   Loose teeth or denture: Denies       CAPRINI SCORE    AGE RELATED RISK FACTORS                                                             [ ] Age 41-60 years                                            (1 Point)  [x ] Age: 61-74 years                                           (2 Points)                 [ ] Age= 75 years                                                (3 Points)             DISEASE RELATED RISK FACTORS                                                       [ ] Edema in the lower extremities                 (1 Point)                     [ ] Varicose veins                                               (1 Point)                                 [x ] BMI > 25 Kg/m2                                            (1 Point)                                  [ ] Serious infection (ie PNA)                            (1 Point)                     [ ] Lung disease ( COPD, Emphysema)            (1 Point)                                                                          [ ] Acute myocardial infarction                         (1 Point)                  [ ] Congestive heart failure (in the previous month)  (1 Point)         [ ] Inflammatory bowel disease                            (1 Point)                  [ ] Central venous access, PICC or Port               (2 points)       (within the last month)                                                                [ ] Stroke (in the previous month)                        (5 Points)    [x ] Previous or present malignancy                       (2 points)                                                                                                                                                         HEMATOLOGY RELATED FACTORS                                                         [ ] Prior episodes of VTE                                     (3 Points)                     [ ] Positive family history for VTE                      (3 Points)                  [ ] Prothrombin 18913 A                                     (3 Points)                     [ ] Factor V Leiden                                                (3 Points)                        [ ] Lupus anticoagulants                                      (3 Points)                                                           [ ] Anticardiolipin antibodies                              (3 Points)                                                       [ ] High homocysteine in the blood                   (3 Points)                                             [ ] Other congenital or acquired thrombophilia      (3 Points)                                                [ ] Heparin induced thrombocytopenia                  (3 Points)                                        MOBILITY RELATED FACTORS  [ ] Bed rest                                                         (1 Point)  [ ] Plaster cast                                                    (2 points)  [ ] Bed bound for more than 72 hours           (2 Points)    GENDER SPECIFIC FACTORS  [ ] Pregnancy or had a baby within the last month   (1 Point)  [ ] Post-partum < 6 weeks                                   (1 Point)  [ ] Hormonal therapy  or oral contraception   (1 Point)  [ ] History of pregnancy complications              (1 point)  [ ] Unexplained or recurrent              (1 Point)    OTHER RISK FACTORS                                           (1 Point)  [ ] BMI >40, smoking, diabetes requiring insulin, chemotherapy  blood transfusions and length of surgery over 2 hours    SURGERY RELATED RISK FACTORS  [ ]  Section within the last month     (1 Point)  [ ] Minor surgery                                                  (1 Point)  [ ] Arthroscopic surgery                                       (2 Points)  [ x] Planned major surgery lasting more            (2 Points)      than 45 minutes     [ ] Elective hip or knee joint replacement       (5 points)       surgery                                                TRAUMA RELATED RISK FACTORS  [ ] Fracture of the hip, pelvis, or leg                       (5 Points)  [ ] Spinal cord injury resulting in paralysis             (5 points)       (in the previous month)    [ ] Paralysis  (less than 1 month)                             (5 Points)  [ ] Multiple Trauma within 1 month                        (5 Points)    Total Score [        ]    Caprini Score 0-2: Low Risk, NO VTE prophylaxis required for most patients, encourage ambulation  Caprini Score 3-6: Moderate Risk , pharmacologic VTE prophylaxis is indicated for most patients (in the absence of contraindications)  Caprini Score Greater than or =7: High risk, pharmocologic VTE prophylaxis indicated for most patients (in the absence of contraindications)                                 DASI score:9.89  DASI activity: Gym 4x a week, lifting weights and cardio   Loose teeth or denture: Denies       CAPRINI SCORE    AGE RELATED RISK FACTORS                                                             [ ] Age 41-60 years                                            (1 Point)  [x ] Age: 61-74 years                                           (2 Points)                 [ ] Age= 75 years                                                (3 Points)             DISEASE RELATED RISK FACTORS                                                       [ ] Edema in the lower extremities                 (1 Point)                     [ ] Varicose veins                                               (1 Point)                                 [x ] BMI > 25 Kg/m2                                            (1 Point)                                  [ ] Serious infection (ie PNA)                            (1 Point)                     [ ] Lung disease ( COPD, Emphysema)            (1 Point)                                                                          [ ] Acute myocardial infarction                         (1 Point)                  [ ] Congestive heart failure (in the previous month)  (1 Point)         [ ] Inflammatory bowel disease                            (1 Point)                  [ ] Central venous access, PICC or Port               (2 points)       (within the last month)                                                                [ ] Stroke (in the previous month)                        (5 Points)    [x ] Previous or present malignancy                       (2 points)                                                                                                                                                         HEMATOLOGY RELATED FACTORS                                                         [ ] Prior episodes of VTE                                     (3 Points)                     [ ] Positive family history for VTE                      (3 Points)                  [ ] Prothrombin 98876 A                                     (3 Points)                     [ ] Factor V Leiden                                                (3 Points)                        [ ] Lupus anticoagulants                                      (3 Points)                                                           [ ] Anticardiolipin antibodies                              (3 Points)                                                       [ ] High homocysteine in the blood                   (3 Points)                                             [ ] Other congenital or acquired thrombophilia      (3 Points)                                                [ ] Heparin induced thrombocytopenia                  (3 Points)                                        MOBILITY RELATED FACTORS  [ ] Bed rest                                                         (1 Point)  [ ] Plaster cast                                                    (2 points)  [ ] Bed bound for more than 72 hours           (2 Points)    GENDER SPECIFIC FACTORS  [ ] Pregnancy or had a baby within the last month   (1 Point)  [ ] Post-partum < 6 weeks                                   (1 Point)  [ ] Hormonal therapy  or oral contraception   (1 Point)  [ ] History of pregnancy complications              (1 point)  [ ] Unexplained or recurrent              (1 Point)    OTHER RISK FACTORS                                           (1 Point)  [ ] BMI >40, smoking, diabetes requiring insulin, chemotherapy  blood transfusions and length of surgery over 2 hours    SURGERY RELATED RISK FACTORS  [ ]  Section within the last month     (1 Point)  [ ] Minor surgery                                                  (1 Point)  [ ] Arthroscopic surgery                                       (2 Points)  [ x] Planned major surgery lasting more            (2 Points)      than 45 minutes     [ ] Elective hip or knee joint replacement       (5 points)       surgery                                                TRAUMA RELATED RISK FACTORS  [ ] Fracture of the hip, pelvis, or leg                       (5 Points)  [ ] Spinal cord injury resulting in paralysis             (5 points)       (in the previous month)    [ ] Paralysis  (less than 1 month)                             (5 Points)  [ ] Multiple Trauma within 1 month                        (5 Points)    Total Score [   7     ]    Caprini Score 0-2: Low Risk, NO VTE prophylaxis required for most patients, encourage ambulation  Caprini Score 3-6: Moderate Risk , pharmacologic VTE prophylaxis is indicated for most patients (in the absence of contraindications)  Caprini Score Greater than or =7: High risk, pharmocologic VTE prophylaxis indicated for most patients (in the absence of contraindications)

## 2025-03-18 NOTE — H&P PST ADULT - HISTORY OF PRESENT ILLNESS
History of Present Illness   59 yo male diagnosed with adenocarcinoma S/P Laparoscopic lower anterior  resection on 1/30/24 with Dr. Aquilino Alegre and a Transurethral resection of  large bladder tumor by Dr. Malcolm. He underwent chemotherapy via right chest  wall Mediport (last dose in 10/24, followed by Onc). Currently denies any  symptoms including hematuria or dysuria. Pt is now s/p Cystoscopy, Bladder  biopsy fulguration on 7/11/24 with Dr. Malcolm (states results were negative).  Now, states he was seen in the office for follow up a few weeks back and more  lesions were seen. He now presents to [facility] for a scheduled Cystoscopy w/ Bladder  Tumor Removal on 3/6/2025. No other significant PMH. Currently feels well and  denies cough, fever, SOB, CP. History of Present Illness   59 yo male diagnosed with adenocarcinoma S/P Laparoscopic lower anterior  resection on 1/30/24 with Dr. Aquilino Alegre and a Transurethral resection of  large bladder tumor by Dr. Malcolm. He underwent chemotherapy via right chest  wall Mediport (last dose in 10/24, followed by Onc). Currently denies any  symptoms including hematuria or dysuria. Pt is now s/p Cystoscopy, Bladder  biopsy fulguration on 7/11/24 with Dr. Malcolm (states results were negative).  Now, states he was seen in the office for follow up a few weeks back and more  lesions were seen. He now presents to [facility] for a scheduled Cystoscopy w/ Bladder  Tumor Removal on 3/6/2025. No other significant PMH. Currently feels well and  denies cough, fever, SOB, CP.     This is a 61 yo male diagnosed with adenocarcinoma S/P Laparoscopic lower anterior resection on 1/30/24 with Dr. Aquilino Alegre and a Transurethral resection of large bladder tumor by Dr. Malcolm. He underwent chemotherapy via right chest wall Mediport (last dose in 10/24, followed by Onc).  Pt is now s/p Cystoscopy, w/ Bladder Tumor Removal on 3/6/2025. New biopsy of colorectal anastomosis on 2/19/2025: adenocarcinoma, moderately-differentiated. CEA (1.0) on 2/5/25. Presents to PST prior to scheduled  ERP Open anterior resection, Possible Ileostomy on 3/25/25 with Dr. Alegre.    . Currently feels well denies  any cough, fever, chills SOB, chest pain or palpitations.

## 2025-03-19 ENCOUNTER — APPOINTMENT (OUTPATIENT)
Dept: UROLOGY | Facility: CLINIC | Age: 60
End: 2025-03-19
Payer: COMMERCIAL

## 2025-03-19 DIAGNOSIS — C67.8 MALIGNANT NEOPLASM OF OVERLAPPING SITES OF BLADDER: ICD-10-CM

## 2025-03-19 LAB
A1C WITH ESTIMATED AVERAGE GLUCOSE RESULT: 5.6 % — SIGNIFICANT CHANGE UP (ref 4–5.6)
ESTIMATED AVERAGE GLUCOSE: 114 MG/DL — SIGNIFICANT CHANGE UP (ref 68–114)

## 2025-03-19 PROCEDURE — 99213 OFFICE O/P EST LOW 20 MIN: CPT | Mod: 93

## 2025-03-19 PROCEDURE — G2211 COMPLEX E/M VISIT ADD ON: CPT | Mod: NC,93

## 2025-03-21 ENCOUNTER — APPOINTMENT (OUTPATIENT)
Dept: UROLOGY | Facility: CLINIC | Age: 60
End: 2025-03-21

## 2025-03-24 RX ORDER — METRONIDAZOLE 250 MG/1
250 TABLET ORAL
Qty: 3 | Refills: 0 | Status: ACTIVE | COMMUNITY
Start: 2025-03-24 | End: 1900-01-01

## 2025-03-24 RX ORDER — NEOMYCIN SULFATE 500 MG/1
500 TABLET ORAL
Qty: 3 | Refills: 0 | Status: ACTIVE | COMMUNITY
Start: 2025-03-24 | End: 1900-01-01

## 2025-03-25 ENCOUNTER — TRANSCRIPTION ENCOUNTER (OUTPATIENT)
Age: 60
End: 2025-03-25

## 2025-03-25 ENCOUNTER — INPATIENT (INPATIENT)
Facility: HOSPITAL | Age: 60
LOS: 3 days | Discharge: HOME | DRG: 395 | End: 2025-03-29
Attending: SURGERY | Admitting: SURGERY
Payer: COMMERCIAL

## 2025-03-25 ENCOUNTER — APPOINTMENT (OUTPATIENT)
Dept: COLORECTAL SURGERY | Facility: HOSPITAL | Age: 60
End: 2025-03-25
Payer: COMMERCIAL

## 2025-03-25 ENCOUNTER — RESULT REVIEW (OUTPATIENT)
Age: 60
End: 2025-03-25

## 2025-03-25 VITALS
HEIGHT: 65.98 IN | DIASTOLIC BLOOD PRESSURE: 81 MMHG | OXYGEN SATURATION: 100 % | TEMPERATURE: 98 F | HEART RATE: 73 BPM | SYSTOLIC BLOOD PRESSURE: 151 MMHG | WEIGHT: 158.07 LBS | RESPIRATION RATE: 17 BRPM

## 2025-03-25 DIAGNOSIS — Z98.890 OTHER SPECIFIED POSTPROCEDURAL STATES: Chronic | ICD-10-CM

## 2025-03-25 DIAGNOSIS — K63.89 OTHER SPECIFIED DISEASES OF INTESTINE: ICD-10-CM

## 2025-03-25 DIAGNOSIS — Z90.49 ACQUIRED ABSENCE OF OTHER SPECIFIED PARTS OF DIGESTIVE TRACT: Chronic | ICD-10-CM

## 2025-03-25 LAB — GLUCOSE BLDC GLUCOMTR-MCNC: 98 MG/DL — SIGNIFICANT CHANGE UP (ref 70–99)

## 2025-03-25 PROCEDURE — 44145 PARTIAL REMOVAL OF COLON: CPT

## 2025-03-25 PROCEDURE — 88309 TISSUE EXAM BY PATHOLOGIST: CPT | Mod: 26

## 2025-03-25 PROCEDURE — 45300 PROCTOSIGMOIDOSCOPY DX: CPT | Mod: 59

## 2025-03-25 PROCEDURE — 88304 TISSUE EXAM BY PATHOLOGIST: CPT | Mod: 26

## 2025-03-25 PROCEDURE — 52005 CYSTO W/URTRL CATHJ: CPT

## 2025-03-25 PROCEDURE — 44310 ILEOSTOMY/JEJUNOSTOMY: CPT

## 2025-03-25 DEVICE — STAPLER ETHICON CIRCULAR XL 29MM: Type: IMPLANTABLE DEVICE | Status: FUNCTIONAL

## 2025-03-25 DEVICE — GUIDEWIRE SENSOR DUAL-FLEX NITINOL STRAIGHT .035" X 150CM: Type: IMPLANTABLE DEVICE | Status: FUNCTIONAL

## 2025-03-25 DEVICE — STAPLER COVIDIEN PURSTRING 65MM: Type: IMPLANTABLE DEVICE | Status: FUNCTIONAL

## 2025-03-25 DEVICE — STAPLER ECHELON CONTOUR CURVED CUTTER 40MM WITH (GREEN) RELOAD: Type: IMPLANTABLE DEVICE | Status: FUNCTIONAL

## 2025-03-25 DEVICE — VISTASEAL FIBRIN HUMAN 4ML: Type: IMPLANTABLE DEVICE | Status: FUNCTIONAL

## 2025-03-25 DEVICE — URETERAL CATH OPEN END 5FR 70CM: Type: IMPLANTABLE DEVICE | Status: FUNCTIONAL

## 2025-03-25 RX ORDER — OFLOXACIN 3 MG/ML
1 SOLUTION OPHTHALMIC EVERY 6 HOURS
Refills: 0 | Status: COMPLETED | OUTPATIENT
Start: 2025-03-25 | End: 2025-03-26

## 2025-03-25 RX ORDER — ACETAMINOPHEN 500 MG/5ML
1000 LIQUID (ML) ORAL EVERY 6 HOURS
Refills: 0 | Status: COMPLETED | OUTPATIENT
Start: 2025-03-25 | End: 2025-03-26

## 2025-03-25 RX ORDER — HYDROMORPHONE/SOD CHLOR,ISO/PF 2 MG/10 ML
0.5 SYRINGE (ML) INJECTION
Refills: 0 | Status: DISCONTINUED | OUTPATIENT
Start: 2025-03-25 | End: 2025-03-25

## 2025-03-25 RX ORDER — CELECOXIB 50 MG/1
400 CAPSULE ORAL ONCE
Refills: 0 | Status: COMPLETED | OUTPATIENT
Start: 2025-03-25 | End: 2025-03-25

## 2025-03-25 RX ORDER — ONDANSETRON HCL/PF 4 MG/2 ML
4 VIAL (ML) INJECTION EVERY 6 HOURS
Refills: 0 | Status: DISCONTINUED | OUTPATIENT
Start: 2025-03-25 | End: 2025-03-28

## 2025-03-25 RX ORDER — INFLUENZA A VIRUS A/IDAHO/07/2018 (H1N1) ANTIGEN (MDCK CELL DERIVED, PROPIOLACTONE INACTIVATED, INFLUENZA A VIRUS A/INDIANA/08/2018 (H3N2) ANTIGEN (MDCK CELL DERIVED, PROPIOLACTONE INACTIVATED), INFLUENZA B VIRUS B/SINGAPORE/INFTT-16-0610/2016 ANTIGEN (MDCK CELL DERIVED, PROPIOLACTONE INACTIVATED), INFLUENZA B VIRUS B/IOWA/06/2017 ANTIGEN (MDCK CELL DERIVED, PROPIOLACTONE INACTIVATED) 15; 15; 15; 15 UG/.5ML; UG/.5ML; UG/.5ML; UG/.5ML
0.5 INJECTION, SUSPENSION INTRAMUSCULAR ONCE
Refills: 0 | Status: DISCONTINUED | OUTPATIENT
Start: 2025-03-25 | End: 2025-03-29

## 2025-03-25 RX ORDER — HEPARIN SODIUM 1000 [USP'U]/ML
5000 INJECTION INTRAVENOUS; SUBCUTANEOUS EVERY 8 HOURS
Refills: 0 | Status: DISCONTINUED | OUTPATIENT
Start: 2025-03-25 | End: 2025-03-27

## 2025-03-25 RX ORDER — SODIUM CHLORIDE 9 G/1000ML
1000 INJECTION, SOLUTION INTRAVENOUS
Refills: 0 | Status: DISCONTINUED | OUTPATIENT
Start: 2025-03-25 | End: 2025-03-27

## 2025-03-25 RX ORDER — HYDROMORPHONE/SOD CHLOR,ISO/PF 2 MG/10 ML
250 SYRINGE (ML) INJECTION
Refills: 0 | Status: DISCONTINUED | OUTPATIENT
Start: 2025-03-25 | End: 2025-03-27

## 2025-03-25 RX ORDER — NALOXONE HYDROCHLORIDE 0.4 MG/ML
0.1 INJECTION, SOLUTION INTRAMUSCULAR; INTRAVENOUS; SUBCUTANEOUS
Refills: 0 | Status: DISCONTINUED | OUTPATIENT
Start: 2025-03-25 | End: 2025-03-28

## 2025-03-25 RX ORDER — LIDOCAINE HCL/PF 10 MG/ML
0.2 VIAL (ML) INJECTION ONCE
Refills: 0 | Status: DISCONTINUED | OUTPATIENT
Start: 2025-03-25 | End: 2025-03-25

## 2025-03-25 RX ORDER — ONDANSETRON HCL/PF 4 MG/2 ML
4 VIAL (ML) INJECTION ONCE
Refills: 0 | Status: DISCONTINUED | OUTPATIENT
Start: 2025-03-25 | End: 2025-03-25

## 2025-03-25 RX ORDER — TETRACAINE HYDROCHLORIDE 5 MG/ML
1 SOLUTION OPHTHALMIC ONCE
Refills: 0 | Status: COMPLETED | OUTPATIENT
Start: 2025-03-25 | End: 2025-03-25

## 2025-03-25 RX ORDER — FENTANYL CITRATE-0.9 % NACL/PF 100MCG/2ML
25 SYRINGE (ML) INTRAVENOUS
Refills: 0 | Status: DISCONTINUED | OUTPATIENT
Start: 2025-03-25 | End: 2025-03-25

## 2025-03-25 RX ORDER — KETOROLAC TROMETHAMINE 30 MG/ML
30 INJECTION, SOLUTION INTRAMUSCULAR; INTRAVENOUS EVERY 6 HOURS
Refills: 0 | Status: DISCONTINUED | OUTPATIENT
Start: 2025-03-25 | End: 2025-03-25

## 2025-03-25 RX ADMIN — Medication 250 MILLILITER(S): at 18:27

## 2025-03-25 RX ADMIN — SODIUM CHLORIDE 40 MILLILITER(S): 9 INJECTION, SOLUTION INTRAVENOUS at 13:57

## 2025-03-25 RX ADMIN — OFLOXACIN 1 DROP(S): 3 SOLUTION OPHTHALMIC at 16:32

## 2025-03-25 RX ADMIN — Medication 1000 MILLIGRAM(S): at 23:35

## 2025-03-25 RX ADMIN — Medication 400 MILLIGRAM(S): at 23:05

## 2025-03-25 RX ADMIN — HEPARIN SODIUM 5000 UNIT(S): 1000 INJECTION INTRAVENOUS; SUBCUTANEOUS at 17:06

## 2025-03-25 RX ADMIN — Medication 400 MILLIGRAM(S): at 17:05

## 2025-03-25 RX ADMIN — TETRACAINE HYDROCHLORIDE 1 DROP(S): 5 SOLUTION OPHTHALMIC at 16:32

## 2025-03-25 RX ADMIN — Medication 250 MILLILITER(S): at 17:23

## 2025-03-25 RX ADMIN — Medication 250 MILLILITER(S): at 19:02

## 2025-03-25 RX ADMIN — Medication 250 MILLILITER(S): at 13:23

## 2025-03-26 LAB
ANION GAP SERPL CALC-SCNC: 10 MMOL/L — SIGNIFICANT CHANGE UP (ref 5–17)
BUN SERPL-MCNC: 18 MG/DL — SIGNIFICANT CHANGE UP (ref 7–23)
CALCIUM SERPL-MCNC: 8.9 MG/DL — SIGNIFICANT CHANGE UP (ref 8.4–10.5)
CHLORIDE SERPL-SCNC: 102 MMOL/L — SIGNIFICANT CHANGE UP (ref 96–108)
CO2 SERPL-SCNC: 26 MMOL/L — SIGNIFICANT CHANGE UP (ref 22–31)
CREAT SERPL-MCNC: 1.41 MG/DL — HIGH (ref 0.5–1.3)
EGFR: 57 ML/MIN/1.73M2 — LOW
EGFR: 57 ML/MIN/1.73M2 — LOW
GLUCOSE SERPL-MCNC: 104 MG/DL — HIGH (ref 70–99)
HCT VFR BLD CALC: 39.5 % — SIGNIFICANT CHANGE UP (ref 39–50)
HGB BLD-MCNC: 12.9 G/DL — LOW (ref 13–17)
MAGNESIUM SERPL-MCNC: 2.3 MG/DL — SIGNIFICANT CHANGE UP (ref 1.6–2.6)
MCHC RBC-ENTMCNC: 28.6 PG — SIGNIFICANT CHANGE UP (ref 27–34)
MCHC RBC-ENTMCNC: 32.7 G/DL — SIGNIFICANT CHANGE UP (ref 32–36)
MCV RBC AUTO: 87.6 FL — SIGNIFICANT CHANGE UP (ref 80–100)
NRBC BLD AUTO-RTO: 0 /100 WBCS — SIGNIFICANT CHANGE UP (ref 0–0)
PHOSPHATE SERPL-MCNC: 3.3 MG/DL — SIGNIFICANT CHANGE UP (ref 2.5–4.5)
PLATELET # BLD AUTO: 335 K/UL — SIGNIFICANT CHANGE UP (ref 150–400)
POTASSIUM SERPL-MCNC: 5.1 MMOL/L — SIGNIFICANT CHANGE UP (ref 3.5–5.3)
POTASSIUM SERPL-SCNC: 5.1 MMOL/L — SIGNIFICANT CHANGE UP (ref 3.5–5.3)
RBC # BLD: 4.51 M/UL — SIGNIFICANT CHANGE UP (ref 4.2–5.8)
RBC # FLD: 13.2 % — SIGNIFICANT CHANGE UP (ref 10.3–14.5)
SODIUM SERPL-SCNC: 138 MMOL/L — SIGNIFICANT CHANGE UP (ref 135–145)
WBC # BLD: 9.93 K/UL — SIGNIFICANT CHANGE UP (ref 3.8–10.5)
WBC # FLD AUTO: 9.93 K/UL — SIGNIFICANT CHANGE UP (ref 3.8–10.5)

## 2025-03-26 RX ORDER — ACETAMINOPHEN 500 MG/5ML
1000 LIQUID (ML) ORAL EVERY 6 HOURS
Refills: 0 | Status: DISCONTINUED | OUTPATIENT
Start: 2025-03-26 | End: 2025-03-29

## 2025-03-26 RX ADMIN — HEPARIN SODIUM 5000 UNIT(S): 1000 INJECTION INTRAVENOUS; SUBCUTANEOUS at 10:33

## 2025-03-26 RX ADMIN — Medication 1000 MILLIGRAM(S): at 13:00

## 2025-03-26 RX ADMIN — Medication 1000 MILLIGRAM(S): at 18:04

## 2025-03-26 RX ADMIN — Medication 400 MILLIGRAM(S): at 12:19

## 2025-03-26 RX ADMIN — Medication 250 MILLILITER(S): at 07:15

## 2025-03-26 RX ADMIN — HEPARIN SODIUM 5000 UNIT(S): 1000 INJECTION INTRAVENOUS; SUBCUTANEOUS at 01:04

## 2025-03-26 RX ADMIN — Medication 400 MILLIGRAM(S): at 05:36

## 2025-03-26 RX ADMIN — Medication 1000 MILLIGRAM(S): at 06:06

## 2025-03-26 RX ADMIN — Medication 250 MILLILITER(S): at 19:18

## 2025-03-26 RX ADMIN — HEPARIN SODIUM 5000 UNIT(S): 1000 INJECTION INTRAVENOUS; SUBCUTANEOUS at 18:04

## 2025-03-27 ENCOUNTER — TRANSCRIPTION ENCOUNTER (OUTPATIENT)
Age: 60
End: 2025-03-27

## 2025-03-27 LAB
ANION GAP SERPL CALC-SCNC: 11 MMOL/L — SIGNIFICANT CHANGE UP (ref 5–17)
APTT BLD: 28.8 SEC — SIGNIFICANT CHANGE UP (ref 24.5–35.6)
BUN SERPL-MCNC: 15 MG/DL — SIGNIFICANT CHANGE UP (ref 7–23)
CALCIUM SERPL-MCNC: 9.3 MG/DL — SIGNIFICANT CHANGE UP (ref 8.4–10.5)
CHLORIDE SERPL-SCNC: 100 MMOL/L — SIGNIFICANT CHANGE UP (ref 96–108)
CO2 SERPL-SCNC: 26 MMOL/L — SIGNIFICANT CHANGE UP (ref 22–31)
CREAT SERPL-MCNC: 1.27 MG/DL — SIGNIFICANT CHANGE UP (ref 0.5–1.3)
EGFR: 64 ML/MIN/1.73M2 — SIGNIFICANT CHANGE UP
EGFR: 64 ML/MIN/1.73M2 — SIGNIFICANT CHANGE UP
GLUCOSE SERPL-MCNC: 89 MG/DL — SIGNIFICANT CHANGE UP (ref 70–99)
HCT VFR BLD CALC: 37.8 % — LOW (ref 39–50)
HGB BLD-MCNC: 12.2 G/DL — LOW (ref 13–17)
INR BLD: 1.03 RATIO — SIGNIFICANT CHANGE UP (ref 0.85–1.16)
MAGNESIUM SERPL-MCNC: 2.2 MG/DL — SIGNIFICANT CHANGE UP (ref 1.6–2.6)
MCHC RBC-ENTMCNC: 28.7 PG — SIGNIFICANT CHANGE UP (ref 27–34)
MCHC RBC-ENTMCNC: 32.3 G/DL — SIGNIFICANT CHANGE UP (ref 32–36)
MCV RBC AUTO: 88.9 FL — SIGNIFICANT CHANGE UP (ref 80–100)
NRBC BLD AUTO-RTO: 0 /100 WBCS — SIGNIFICANT CHANGE UP (ref 0–0)
PHOSPHATE SERPL-MCNC: 2.3 MG/DL — LOW (ref 2.5–4.5)
PLATELET # BLD AUTO: 343 K/UL — SIGNIFICANT CHANGE UP (ref 150–400)
POTASSIUM SERPL-MCNC: 5.2 MMOL/L — SIGNIFICANT CHANGE UP (ref 3.5–5.3)
POTASSIUM SERPL-SCNC: 5.2 MMOL/L — SIGNIFICANT CHANGE UP (ref 3.5–5.3)
PROTHROM AB SERPL-ACNC: 11.8 SEC — SIGNIFICANT CHANGE UP (ref 9.9–13.4)
RBC # BLD: 4.25 M/UL — SIGNIFICANT CHANGE UP (ref 4.2–5.8)
RBC # FLD: 13.3 % — SIGNIFICANT CHANGE UP (ref 10.3–14.5)
SODIUM SERPL-SCNC: 137 MMOL/L — SIGNIFICANT CHANGE UP (ref 135–145)
WBC # BLD: 8.81 K/UL — SIGNIFICANT CHANGE UP (ref 3.8–10.5)
WBC # FLD AUTO: 8.81 K/UL — SIGNIFICANT CHANGE UP (ref 3.8–10.5)

## 2025-03-27 RX ORDER — ACETAMINOPHEN 500 MG/5ML
2 LIQUID (ML) ORAL
Qty: 0 | Refills: 0 | DISCHARGE
Start: 2025-03-27

## 2025-03-27 RX ORDER — OXYCODONE HYDROCHLORIDE 30 MG/1
1 TABLET ORAL
Qty: 5 | Refills: 0
Start: 2025-03-27

## 2025-03-27 RX ORDER — APIXABAN 2.5 MG/1
1 TABLET, FILM COATED ORAL
Qty: 56 | Refills: 0
Start: 2025-03-27 | End: 2025-04-23

## 2025-03-27 RX ORDER — OXYCODONE HYDROCHLORIDE 30 MG/1
5 TABLET ORAL EVERY 4 HOURS
Refills: 0 | Status: DISCONTINUED | OUTPATIENT
Start: 2025-03-27 | End: 2025-03-29

## 2025-03-27 RX ORDER — OXYCODONE HYDROCHLORIDE 30 MG/1
10 TABLET ORAL EVERY 4 HOURS
Refills: 0 | Status: DISCONTINUED | OUTPATIENT
Start: 2025-03-27 | End: 2025-03-29

## 2025-03-27 RX ORDER — ENOXAPARIN SODIUM 100 MG/ML
40 INJECTION SUBCUTANEOUS EVERY 24 HOURS
Refills: 0 | Status: DISCONTINUED | OUTPATIENT
Start: 2025-03-27 | End: 2025-03-29

## 2025-03-27 RX ORDER — SODIUM PHOSPHATE,DIBASIC DIHYD
15 POWDER (GRAM) MISCELLANEOUS ONCE
Refills: 0 | Status: COMPLETED | OUTPATIENT
Start: 2025-03-27 | End: 2025-03-27

## 2025-03-27 RX ADMIN — Medication 1000 MILLIGRAM(S): at 23:52

## 2025-03-27 RX ADMIN — HEPARIN SODIUM 5000 UNIT(S): 1000 INJECTION INTRAVENOUS; SUBCUTANEOUS at 00:04

## 2025-03-27 RX ADMIN — Medication 1000 MILLIGRAM(S): at 17:11

## 2025-03-27 RX ADMIN — OXYCODONE HYDROCHLORIDE 10 MILLIGRAM(S): 30 TABLET ORAL at 20:55

## 2025-03-27 RX ADMIN — Medication 1000 MILLIGRAM(S): at 17:51

## 2025-03-27 RX ADMIN — OXYCODONE HYDROCHLORIDE 10 MILLIGRAM(S): 30 TABLET ORAL at 20:25

## 2025-03-27 RX ADMIN — Medication 1000 MILLIGRAM(S): at 11:21

## 2025-03-27 RX ADMIN — Medication 63.75 MILLIMOLE(S): at 10:04

## 2025-03-27 RX ADMIN — Medication 1000 MILLIGRAM(S): at 00:35

## 2025-03-27 RX ADMIN — ENOXAPARIN SODIUM 40 MILLIGRAM(S): 100 INJECTION SUBCUTANEOUS at 16:20

## 2025-03-27 RX ADMIN — Medication 250 MILLILITER(S): at 07:17

## 2025-03-27 RX ADMIN — SODIUM CHLORIDE 40 MILLILITER(S): 9 INJECTION, SOLUTION INTRAVENOUS at 05:47

## 2025-03-27 RX ADMIN — Medication 1000 MILLIGRAM(S): at 05:47

## 2025-03-27 RX ADMIN — OXYCODONE HYDROCHLORIDE 10 MILLIGRAM(S): 30 TABLET ORAL at 17:00

## 2025-03-27 RX ADMIN — Medication 1000 MILLIGRAM(S): at 12:15

## 2025-03-27 RX ADMIN — Medication 1000 MILLIGRAM(S): at 00:05

## 2025-03-27 RX ADMIN — Medication 250 MILLILITER(S): at 00:07

## 2025-03-27 RX ADMIN — OXYCODONE HYDROCHLORIDE 10 MILLIGRAM(S): 30 TABLET ORAL at 16:20

## 2025-03-27 RX ADMIN — Medication 1000 MILLIGRAM(S): at 06:17

## 2025-03-28 LAB
ANION GAP SERPL CALC-SCNC: 13 MMOL/L — SIGNIFICANT CHANGE UP (ref 5–17)
BUN SERPL-MCNC: 13 MG/DL — SIGNIFICANT CHANGE UP (ref 7–23)
CALCIUM SERPL-MCNC: 9.5 MG/DL — SIGNIFICANT CHANGE UP (ref 8.4–10.5)
CHLORIDE SERPL-SCNC: 100 MMOL/L — SIGNIFICANT CHANGE UP (ref 96–108)
CO2 SERPL-SCNC: 24 MMOL/L — SIGNIFICANT CHANGE UP (ref 22–31)
CREAT SERPL-MCNC: 1.18 MG/DL — SIGNIFICANT CHANGE UP (ref 0.5–1.3)
EGFR: 70 ML/MIN/1.73M2 — SIGNIFICANT CHANGE UP
EGFR: 70 ML/MIN/1.73M2 — SIGNIFICANT CHANGE UP
GLUCOSE SERPL-MCNC: 90 MG/DL — SIGNIFICANT CHANGE UP (ref 70–99)
HCT VFR BLD CALC: 39.6 % — SIGNIFICANT CHANGE UP (ref 39–50)
HGB BLD-MCNC: 12.9 G/DL — LOW (ref 13–17)
MAGNESIUM SERPL-MCNC: 2.2 MG/DL — SIGNIFICANT CHANGE UP (ref 1.6–2.6)
MCHC RBC-ENTMCNC: 28.8 PG — SIGNIFICANT CHANGE UP (ref 27–34)
MCHC RBC-ENTMCNC: 32.6 G/DL — SIGNIFICANT CHANGE UP (ref 32–36)
MCV RBC AUTO: 88.4 FL — SIGNIFICANT CHANGE UP (ref 80–100)
NRBC BLD AUTO-RTO: 0 /100 WBCS — SIGNIFICANT CHANGE UP (ref 0–0)
PHOSPHATE SERPL-MCNC: 2.3 MG/DL — LOW (ref 2.5–4.5)
PLATELET # BLD AUTO: 365 K/UL — SIGNIFICANT CHANGE UP (ref 150–400)
POTASSIUM SERPL-MCNC: 4.6 MMOL/L — SIGNIFICANT CHANGE UP (ref 3.5–5.3)
POTASSIUM SERPL-SCNC: 4.6 MMOL/L — SIGNIFICANT CHANGE UP (ref 3.5–5.3)
RBC # BLD: 4.48 M/UL — SIGNIFICANT CHANGE UP (ref 4.2–5.8)
RBC # FLD: 13.3 % — SIGNIFICANT CHANGE UP (ref 10.3–14.5)
SODIUM SERPL-SCNC: 137 MMOL/L — SIGNIFICANT CHANGE UP (ref 135–145)
WBC # BLD: 6.91 K/UL — SIGNIFICANT CHANGE UP (ref 3.8–10.5)
WBC # FLD AUTO: 6.91 K/UL — SIGNIFICANT CHANGE UP (ref 3.8–10.5)

## 2025-03-28 RX ORDER — IBUPROFEN 200 MG
600 TABLET ORAL EVERY 6 HOURS
Refills: 0 | Status: DISCONTINUED | OUTPATIENT
Start: 2025-03-28 | End: 2025-03-29

## 2025-03-28 RX ORDER — SODIUM PHOSPHATE,DIBASIC DIHYD
15 POWDER (GRAM) MISCELLANEOUS ONCE
Refills: 0 | Status: COMPLETED | OUTPATIENT
Start: 2025-03-28 | End: 2025-03-28

## 2025-03-28 RX ADMIN — Medication 1000 MILLIGRAM(S): at 05:01

## 2025-03-28 RX ADMIN — Medication 1000 MILLIGRAM(S): at 00:22

## 2025-03-28 RX ADMIN — OXYCODONE HYDROCHLORIDE 10 MILLIGRAM(S): 30 TABLET ORAL at 01:06

## 2025-03-28 RX ADMIN — Medication 600 MILLIGRAM(S): at 10:21

## 2025-03-28 RX ADMIN — Medication 1000 MILLIGRAM(S): at 18:10

## 2025-03-28 RX ADMIN — OXYCODONE HYDROCHLORIDE 10 MILLIGRAM(S): 30 TABLET ORAL at 04:42

## 2025-03-28 RX ADMIN — Medication 1000 MILLIGRAM(S): at 17:17

## 2025-03-28 RX ADMIN — Medication 600 MILLIGRAM(S): at 21:30

## 2025-03-28 RX ADMIN — Medication 600 MILLIGRAM(S): at 21:00

## 2025-03-28 RX ADMIN — Medication 1000 MILLIGRAM(S): at 12:28

## 2025-03-28 RX ADMIN — OXYCODONE HYDROCHLORIDE 5 MILLIGRAM(S): 30 TABLET ORAL at 23:02

## 2025-03-28 RX ADMIN — Medication 600 MILLIGRAM(S): at 15:56

## 2025-03-28 RX ADMIN — OXYCODONE HYDROCHLORIDE 10 MILLIGRAM(S): 30 TABLET ORAL at 05:12

## 2025-03-28 RX ADMIN — Medication 600 MILLIGRAM(S): at 09:28

## 2025-03-28 RX ADMIN — OXYCODONE HYDROCHLORIDE 5 MILLIGRAM(S): 30 TABLET ORAL at 23:32

## 2025-03-28 RX ADMIN — OXYCODONE HYDROCHLORIDE 10 MILLIGRAM(S): 30 TABLET ORAL at 00:36

## 2025-03-28 RX ADMIN — Medication 600 MILLIGRAM(S): at 16:48

## 2025-03-28 RX ADMIN — Medication 1000 MILLIGRAM(S): at 11:36

## 2025-03-28 RX ADMIN — ENOXAPARIN SODIUM 40 MILLIGRAM(S): 100 INJECTION SUBCUTANEOUS at 18:11

## 2025-03-28 RX ADMIN — Medication 63.75 MILLIMOLE(S): at 09:29

## 2025-03-28 RX ADMIN — Medication 1000 MILLIGRAM(S): at 05:31

## 2025-03-29 ENCOUNTER — NON-APPOINTMENT (OUTPATIENT)
Age: 60
End: 2025-03-29

## 2025-03-29 VITALS
HEART RATE: 70 BPM | DIASTOLIC BLOOD PRESSURE: 70 MMHG | SYSTOLIC BLOOD PRESSURE: 135 MMHG | OXYGEN SATURATION: 97 % | RESPIRATION RATE: 18 BRPM | TEMPERATURE: 98 F

## 2025-03-29 LAB
ANION GAP SERPL CALC-SCNC: 10 MMOL/L — SIGNIFICANT CHANGE UP (ref 5–17)
BUN SERPL-MCNC: 14 MG/DL — SIGNIFICANT CHANGE UP (ref 7–23)
CALCIUM SERPL-MCNC: 9.6 MG/DL — SIGNIFICANT CHANGE UP (ref 8.4–10.5)
CHLORIDE SERPL-SCNC: 104 MMOL/L — SIGNIFICANT CHANGE UP (ref 96–108)
CO2 SERPL-SCNC: 24 MMOL/L — SIGNIFICANT CHANGE UP (ref 22–31)
CREAT SERPL-MCNC: 1.26 MG/DL — SIGNIFICANT CHANGE UP (ref 0.5–1.3)
EGFR: 65 ML/MIN/1.73M2 — SIGNIFICANT CHANGE UP
EGFR: 65 ML/MIN/1.73M2 — SIGNIFICANT CHANGE UP
GLUCOSE SERPL-MCNC: 81 MG/DL — SIGNIFICANT CHANGE UP (ref 70–99)
HCT VFR BLD CALC: 38.4 % — LOW (ref 39–50)
HGB BLD-MCNC: 12.7 G/DL — LOW (ref 13–17)
MAGNESIUM SERPL-MCNC: 2.1 MG/DL — SIGNIFICANT CHANGE UP (ref 1.6–2.6)
MCHC RBC-ENTMCNC: 29 PG — SIGNIFICANT CHANGE UP (ref 27–34)
MCHC RBC-ENTMCNC: 33.1 G/DL — SIGNIFICANT CHANGE UP (ref 32–36)
MCV RBC AUTO: 87.7 FL — SIGNIFICANT CHANGE UP (ref 80–100)
NRBC BLD AUTO-RTO: 0 /100 WBCS — SIGNIFICANT CHANGE UP (ref 0–0)
PHOSPHATE SERPL-MCNC: 2.7 MG/DL — SIGNIFICANT CHANGE UP (ref 2.5–4.5)
PLATELET # BLD AUTO: 353 K/UL — SIGNIFICANT CHANGE UP (ref 150–400)
POTASSIUM SERPL-MCNC: 4.8 MMOL/L — SIGNIFICANT CHANGE UP (ref 3.5–5.3)
POTASSIUM SERPL-SCNC: 4.8 MMOL/L — SIGNIFICANT CHANGE UP (ref 3.5–5.3)
RBC # BLD: 4.38 M/UL — SIGNIFICANT CHANGE UP (ref 4.2–5.8)
RBC # FLD: 13.2 % — SIGNIFICANT CHANGE UP (ref 10.3–14.5)
SODIUM SERPL-SCNC: 138 MMOL/L — SIGNIFICANT CHANGE UP (ref 135–145)
WBC # BLD: 5.84 K/UL — SIGNIFICANT CHANGE UP (ref 3.8–10.5)
WBC # FLD AUTO: 5.84 K/UL — SIGNIFICANT CHANGE UP (ref 3.8–10.5)

## 2025-03-29 PROCEDURE — C1889: CPT

## 2025-03-29 PROCEDURE — 83735 ASSAY OF MAGNESIUM: CPT

## 2025-03-29 PROCEDURE — 88309 TISSUE EXAM BY PATHOLOGIST: CPT

## 2025-03-29 PROCEDURE — 85610 PROTHROMBIN TIME: CPT

## 2025-03-29 PROCEDURE — C9399: CPT

## 2025-03-29 PROCEDURE — 80048 BASIC METABOLIC PNL TOTAL CA: CPT

## 2025-03-29 PROCEDURE — 97161 PT EVAL LOW COMPLEX 20 MIN: CPT

## 2025-03-29 PROCEDURE — 82962 GLUCOSE BLOOD TEST: CPT

## 2025-03-29 PROCEDURE — C1769: CPT

## 2025-03-29 PROCEDURE — 85730 THROMBOPLASTIN TIME PARTIAL: CPT

## 2025-03-29 PROCEDURE — 97530 THERAPEUTIC ACTIVITIES: CPT

## 2025-03-29 PROCEDURE — 85027 COMPLETE CBC AUTOMATED: CPT

## 2025-03-29 PROCEDURE — 84100 ASSAY OF PHOSPHORUS: CPT

## 2025-03-29 PROCEDURE — 88304 TISSUE EXAM BY PATHOLOGIST: CPT

## 2025-03-29 PROCEDURE — C1758: CPT

## 2025-03-29 PROCEDURE — 97116 GAIT TRAINING THERAPY: CPT

## 2025-03-29 RX ORDER — SOD PHOS DI, MONO/K PHOS MONO 250 MG
1 TABLET ORAL ONCE
Refills: 0 | Status: COMPLETED | OUTPATIENT
Start: 2025-03-29 | End: 2025-03-29

## 2025-03-29 RX ADMIN — Medication 1000 MILLIGRAM(S): at 03:16

## 2025-03-29 RX ADMIN — Medication 600 MILLIGRAM(S): at 05:23

## 2025-03-29 RX ADMIN — Medication 600 MILLIGRAM(S): at 11:12

## 2025-03-29 RX ADMIN — Medication 1000 MILLIGRAM(S): at 13:26

## 2025-03-29 RX ADMIN — OXYCODONE HYDROCHLORIDE 10 MILLIGRAM(S): 30 TABLET ORAL at 16:20

## 2025-03-29 RX ADMIN — Medication 600 MILLIGRAM(S): at 11:42

## 2025-03-29 RX ADMIN — Medication 1000 MILLIGRAM(S): at 02:46

## 2025-03-29 RX ADMIN — Medication 1 PACKET(S): at 11:23

## 2025-03-31 ENCOUNTER — NON-APPOINTMENT (OUTPATIENT)
Age: 60
End: 2025-03-31

## 2025-04-01 ENCOUNTER — NON-APPOINTMENT (OUTPATIENT)
Age: 60
End: 2025-04-01

## 2025-04-01 LAB — SURGICAL PATHOLOGY STUDY: SIGNIFICANT CHANGE UP

## 2025-04-03 ENCOUNTER — NON-APPOINTMENT (OUTPATIENT)
Age: 60
End: 2025-04-03

## 2025-04-09 ENCOUNTER — APPOINTMENT (OUTPATIENT)
Dept: COLORECTAL SURGERY | Facility: CLINIC | Age: 60
End: 2025-04-09
Payer: COMMERCIAL

## 2025-04-09 PROCEDURE — 99024 POSTOP FOLLOW-UP VISIT: CPT

## 2025-04-14 ENCOUNTER — NON-APPOINTMENT (OUTPATIENT)
Age: 60
End: 2025-04-14

## 2025-05-12 ENCOUNTER — APPOINTMENT (OUTPATIENT)
Dept: COLORECTAL SURGERY | Facility: CLINIC | Age: 60
End: 2025-05-12
Payer: COMMERCIAL

## 2025-05-12 PROCEDURE — 45303 PROCTOSIGMOIDOSCOPY DILATE: CPT | Mod: 58

## 2025-05-14 ENCOUNTER — OUTPATIENT (OUTPATIENT)
Dept: OUTPATIENT SERVICES | Facility: HOSPITAL | Age: 60
LOS: 1 days | End: 2025-05-14
Payer: COMMERCIAL

## 2025-05-14 ENCOUNTER — APPOINTMENT (OUTPATIENT)
Dept: UROLOGY | Facility: CLINIC | Age: 60
End: 2025-05-14
Payer: COMMERCIAL

## 2025-05-14 VITALS
OXYGEN SATURATION: 96 % | DIASTOLIC BLOOD PRESSURE: 89 MMHG | RESPIRATION RATE: 17 BRPM | SYSTOLIC BLOOD PRESSURE: 162 MMHG | HEART RATE: 81 BPM

## 2025-05-14 DIAGNOSIS — Z90.49 ACQUIRED ABSENCE OF OTHER SPECIFIED PARTS OF DIGESTIVE TRACT: Chronic | ICD-10-CM

## 2025-05-14 DIAGNOSIS — Z98.890 OTHER SPECIFIED POSTPROCEDURAL STATES: Chronic | ICD-10-CM

## 2025-05-14 DIAGNOSIS — C67.8 MALIGNANT NEOPLASM OF OVERLAPPING SITES OF BLADDER: ICD-10-CM

## 2025-05-14 DIAGNOSIS — R35.0 FREQUENCY OF MICTURITION: ICD-10-CM

## 2025-05-14 LAB — CORE LAB BIOPSY: NORMAL

## 2025-05-14 PROCEDURE — 51720 TREATMENT OF BLADDER LESION: CPT

## 2025-05-14 RX ORDER — BACILLUS CALMETTE-GUERIN 50 MG/50ML
50 POWDER, FOR SUSPENSION INTRAVESICAL
Refills: 0 | Status: COMPLETED | OUTPATIENT
Start: 2025-05-14

## 2025-05-14 RX ADMIN — BACILLUS CALMETTE-GUERIN 0 MG: 50 POWDER, FOR SUSPENSION INTRAVESICAL at 00:00

## 2025-05-19 DIAGNOSIS — C67.8 MALIGNANT NEOPLASM OF OVERLAPPING SITES OF BLADDER: ICD-10-CM

## 2025-05-21 ENCOUNTER — OUTPATIENT (OUTPATIENT)
Dept: OUTPATIENT SERVICES | Facility: HOSPITAL | Age: 60
LOS: 1 days | End: 2025-05-21
Payer: COMMERCIAL

## 2025-05-21 ENCOUNTER — APPOINTMENT (OUTPATIENT)
Dept: UROLOGY | Facility: CLINIC | Age: 60
End: 2025-05-21
Payer: COMMERCIAL

## 2025-05-21 ENCOUNTER — NON-APPOINTMENT (OUTPATIENT)
Age: 60
End: 2025-05-21

## 2025-05-21 VITALS
SYSTOLIC BLOOD PRESSURE: 148 MMHG | TEMPERATURE: 98 F | OXYGEN SATURATION: 98 % | DIASTOLIC BLOOD PRESSURE: 86 MMHG | RESPIRATION RATE: 16 BRPM | HEART RATE: 78 BPM

## 2025-05-21 DIAGNOSIS — Z98.890 OTHER SPECIFIED POSTPROCEDURAL STATES: Chronic | ICD-10-CM

## 2025-05-21 DIAGNOSIS — Z90.49 ACQUIRED ABSENCE OF OTHER SPECIFIED PARTS OF DIGESTIVE TRACT: Chronic | ICD-10-CM

## 2025-05-21 DIAGNOSIS — R35.0 FREQUENCY OF MICTURITION: ICD-10-CM

## 2025-05-21 PROCEDURE — 51720 TREATMENT OF BLADDER LESION: CPT

## 2025-05-21 RX ORDER — BACILLUS CALMETTE-GUERIN 50 MG/50ML
50 POWDER, FOR SUSPENSION INTRAVESICAL
Refills: 0 | Status: COMPLETED | OUTPATIENT
Start: 2025-05-21

## 2025-05-21 RX ADMIN — BACILLUS CALMETTE-GUERIN 0 MG: 50 POWDER, FOR SUSPENSION INTRAVESICAL at 00:00

## 2025-05-27 DIAGNOSIS — C67.8 MALIGNANT NEOPLASM OF OVERLAPPING SITES OF BLADDER: ICD-10-CM

## 2025-05-28 ENCOUNTER — APPOINTMENT (OUTPATIENT)
Dept: UROLOGY | Facility: CLINIC | Age: 60
End: 2025-05-28
Payer: COMMERCIAL

## 2025-05-28 ENCOUNTER — OUTPATIENT (OUTPATIENT)
Dept: OUTPATIENT SERVICES | Facility: HOSPITAL | Age: 60
LOS: 1 days | End: 2025-05-28
Payer: COMMERCIAL

## 2025-05-28 VITALS — DIASTOLIC BLOOD PRESSURE: 80 MMHG | SYSTOLIC BLOOD PRESSURE: 147 MMHG | HEART RATE: 72 BPM

## 2025-05-28 DIAGNOSIS — R35.0 FREQUENCY OF MICTURITION: ICD-10-CM

## 2025-05-28 DIAGNOSIS — Z98.890 OTHER SPECIFIED POSTPROCEDURAL STATES: Chronic | ICD-10-CM

## 2025-05-28 DIAGNOSIS — Z90.49 ACQUIRED ABSENCE OF OTHER SPECIFIED PARTS OF DIGESTIVE TRACT: Chronic | ICD-10-CM

## 2025-05-28 PROCEDURE — 51720 TREATMENT OF BLADDER LESION: CPT

## 2025-05-28 RX ORDER — BACILLUS CALMETTE-GUERIN 50 MG/50ML
50 POWDER, FOR SUSPENSION INTRAVESICAL
Refills: 0 | Status: COMPLETED | OUTPATIENT
Start: 2025-05-28

## 2025-05-28 RX ADMIN — BACILLUS CALMETTE-GUERIN 0 MG: 50 POWDER, FOR SUSPENSION INTRAVESICAL at 00:00

## 2025-05-29 DIAGNOSIS — C67.8 MALIGNANT NEOPLASM OF OVERLAPPING SITES OF BLADDER: ICD-10-CM

## 2025-06-04 ENCOUNTER — APPOINTMENT (OUTPATIENT)
Dept: UROLOGY | Facility: CLINIC | Age: 60
End: 2025-06-04
Payer: COMMERCIAL

## 2025-06-04 ENCOUNTER — OUTPATIENT (OUTPATIENT)
Dept: OUTPATIENT SERVICES | Facility: HOSPITAL | Age: 60
LOS: 1 days | End: 2025-06-04
Payer: COMMERCIAL

## 2025-06-04 VITALS
HEART RATE: 71 BPM | WEIGHT: 145.95 LBS | TEMPERATURE: 98 F | DIASTOLIC BLOOD PRESSURE: 83 MMHG | RESPIRATION RATE: 20 BRPM | OXYGEN SATURATION: 97 % | SYSTOLIC BLOOD PRESSURE: 144 MMHG | HEIGHT: 66.54 IN

## 2025-06-04 VITALS — SYSTOLIC BLOOD PRESSURE: 136 MMHG | DIASTOLIC BLOOD PRESSURE: 77 MMHG | HEART RATE: 69 BPM

## 2025-06-04 DIAGNOSIS — Z98.890 OTHER SPECIFIED POSTPROCEDURAL STATES: Chronic | ICD-10-CM

## 2025-06-04 DIAGNOSIS — Z90.49 ACQUIRED ABSENCE OF OTHER SPECIFIED PARTS OF DIGESTIVE TRACT: Chronic | ICD-10-CM

## 2025-06-04 DIAGNOSIS — K63.89 OTHER SPECIFIED DISEASES OF INTESTINE: ICD-10-CM

## 2025-06-04 DIAGNOSIS — Z93.2 ILEOSTOMY STATUS: Chronic | ICD-10-CM

## 2025-06-04 DIAGNOSIS — Z01.818 ENCOUNTER FOR OTHER PREPROCEDURAL EXAMINATION: ICD-10-CM

## 2025-06-04 DIAGNOSIS — C18.9 MALIGNANT NEOPLASM OF COLON, UNSPECIFIED: ICD-10-CM

## 2025-06-04 DIAGNOSIS — R35.0 FREQUENCY OF MICTURITION: ICD-10-CM

## 2025-06-04 LAB
A1C WITH ESTIMATED AVERAGE GLUCOSE RESULT: 5.7 % — HIGH (ref 4–5.6)
ANION GAP SERPL CALC-SCNC: 11 MMOL/L — SIGNIFICANT CHANGE UP (ref 5–17)
BLD GP AB SCN SERPL QL: NEGATIVE — SIGNIFICANT CHANGE UP
BUN SERPL-MCNC: 20 MG/DL — SIGNIFICANT CHANGE UP (ref 7–23)
CALCIUM SERPL-MCNC: 9.8 MG/DL — SIGNIFICANT CHANGE UP (ref 8.4–10.5)
CEA SERPL-MCNC: 1.4 NG/ML — SIGNIFICANT CHANGE UP (ref 0–3.8)
CHLORIDE SERPL-SCNC: 103 MMOL/L — SIGNIFICANT CHANGE UP (ref 96–108)
CO2 SERPL-SCNC: 22 MMOL/L — SIGNIFICANT CHANGE UP (ref 22–31)
CREAT SERPL-MCNC: 1.26 MG/DL — SIGNIFICANT CHANGE UP (ref 0.5–1.3)
EGFR: 65 ML/MIN/1.73M2 — SIGNIFICANT CHANGE UP
EGFR: 65 ML/MIN/1.73M2 — SIGNIFICANT CHANGE UP
ESTIMATED AVERAGE GLUCOSE: 117 MG/DL — HIGH (ref 68–114)
GLUCOSE SERPL-MCNC: 96 MG/DL — SIGNIFICANT CHANGE UP (ref 70–99)
HCT VFR BLD CALC: 45.6 % — SIGNIFICANT CHANGE UP (ref 39–50)
HGB BLD-MCNC: 15 G/DL — SIGNIFICANT CHANGE UP (ref 13–17)
MCHC RBC-ENTMCNC: 28.5 PG — SIGNIFICANT CHANGE UP (ref 27–34)
MCHC RBC-ENTMCNC: 32.9 G/DL — SIGNIFICANT CHANGE UP (ref 32–36)
MCV RBC AUTO: 86.7 FL — SIGNIFICANT CHANGE UP (ref 80–100)
NRBC BLD AUTO-RTO: 0 /100 WBCS — SIGNIFICANT CHANGE UP (ref 0–0)
PLATELET # BLD AUTO: 461 K/UL — HIGH (ref 150–400)
POTASSIUM SERPL-MCNC: 4.6 MMOL/L — SIGNIFICANT CHANGE UP (ref 3.5–5.3)
POTASSIUM SERPL-SCNC: 4.6 MMOL/L — SIGNIFICANT CHANGE UP (ref 3.5–5.3)
RBC # BLD: 5.26 M/UL — SIGNIFICANT CHANGE UP (ref 4.2–5.8)
RBC # FLD: 13.1 % — SIGNIFICANT CHANGE UP (ref 10.3–14.5)
RH IG SCN BLD-IMP: POSITIVE — SIGNIFICANT CHANGE UP
SODIUM SERPL-SCNC: 136 MMOL/L — SIGNIFICANT CHANGE UP (ref 135–145)
WBC # BLD: 5.86 K/UL — SIGNIFICANT CHANGE UP (ref 3.8–10.5)
WBC # FLD AUTO: 5.86 K/UL — SIGNIFICANT CHANGE UP (ref 3.8–10.5)

## 2025-06-04 PROCEDURE — 83036 HEMOGLOBIN GLYCOSYLATED A1C: CPT

## 2025-06-04 PROCEDURE — 86900 BLOOD TYPING SEROLOGIC ABO: CPT

## 2025-06-04 PROCEDURE — 51720 TREATMENT OF BLADDER LESION: CPT

## 2025-06-04 PROCEDURE — 82378 CARCINOEMBRYONIC ANTIGEN: CPT

## 2025-06-04 PROCEDURE — 86901 BLOOD TYPING SEROLOGIC RH(D): CPT

## 2025-06-04 PROCEDURE — 85027 COMPLETE CBC AUTOMATED: CPT

## 2025-06-04 PROCEDURE — 80048 BASIC METABOLIC PNL TOTAL CA: CPT

## 2025-06-04 PROCEDURE — G0463: CPT

## 2025-06-04 PROCEDURE — 86850 RBC ANTIBODY SCREEN: CPT

## 2025-06-04 RX ORDER — CEFOTETAN DISODIUM 1 G
2 VIAL (EA) INJECTION ONCE
Refills: 0 | Status: DISCONTINUED | OUTPATIENT
Start: 2025-06-24 | End: 2025-06-25

## 2025-06-04 RX ORDER — BACILLUS CALMETTE-GUERIN 50 MG/50ML
50 POWDER, FOR SUSPENSION INTRAVESICAL
Refills: 0 | Status: COMPLETED | OUTPATIENT
Start: 2025-06-04

## 2025-06-04 NOTE — H&P PST ADULT - NSICDXPASTSURGICALHX_GEN_ALL_CORE_FT
PAST SURGICAL HISTORY:  H/O colonoscopy     H/O cystoscopy     H/O inguinal hernia repair     H/O sigmoidoscopy     History of low anterior resection of rectum     History of transurethral resection of bladder tumor (TURBT)     S/P bladder tumor excision with fulguration     S/P colon resection     S/P ileostomy

## 2025-06-04 NOTE — H&P PST ADULT - NSALCOHOLLASTUSE_GEN_A_CORE_DT
Pt becomes irritable and agitated when staff members have to come in to room for round, labs, vitals. Pt stands behind door, and refuses to move when another staff member is in caring for roommate. Begins trying to shut door on staff member; mumbling under breath. States "If I'm not out of here by Monday it's your ass. I will fuck sombody up!" Pt agrees to walk to nurse's station w/ staff in order to take am meds. Becomes agitated when staff can not understand what he's saying at the window. Postures at staff through window. Pt is reminded of unit expectations and informed that violence and threatening violence is unacceptable. Pt does not seem receptive. PRN Zyprexa 5mg PO is given w/ scheduled meds.  Will monitor for effectiveness/ 15-Mar-2025

## 2025-06-04 NOTE — H&P PST ADULT - NSANTHGENDERRD_ENT_A_CORE
Subjective   Reason for Visit: Papi Lou is an 75 y.o. male here for a Medicare Wellness visit.     HPI  Past medical history includes BPH, BETY on CPAP, hypertension, hyperlipidemia, multinodular goiter, history of diverticulitis status post partial colectomy, monoclonal B- lymphocytes, prediabetes and morbid obesity.     He got a new CPAP machine with a slightly smaller new mask, very happy with new device. Wears it every night, feels great, does not sleep and get displaced like the previous one did. Notices better sleep quality with decreased nocturnal awakening, feeling more rested in the morning and general energy is improved. Was seen by ENT and Dr. Marina performed surgery on 10/13/2022: septoplasty, turbinate reduction, nasal valve repair.     He was started on Ozempic after his last visit 9/22/2022. Definitely notices that it helps appetite. He wants to continue despite side effects which include nausea and increased constipation.     Hx of GERD, increased at visit in AUG 2022, so went to BID PPI again and symptoms resolved.     BPH with increased urgency. Also with weak stream and occasional leaking. No change with flomax.  More recently there was an increase in PSA so he is following closely with Dr. Hines.        In [place], DEC-APR, established with Dr. Kandis Jc down there who also referred him to GI, pain management, podiatry.     S/P right TKA in AUG 2021 (now bilateral done). Also back pain, chronic. Did 8 weeks of PT in [place].     Blood pressure has been adequately controlled, 120-130s/70-80s at home, also at oncology was around 130/70 range last month.     Obesity has been a difficult issue for him. His goal is less than 240 pounds. After initial visit he did well with portion control, decreased carbohydrate intake and increased lean proteins. Started exercising regularly.   Now status post knee replacement hoping to be more active. Back is often limiting.  Trying to limit  caloric intake, carbohydrates.     Has very strong family history of cardiovascular disease, father had 5 heart attacks with the initial around 50, 2 paternal uncles and his brother also had heart attack and/or heart disease.   He had a cardiac catheterization 11/07/2017 showing nonobstructive CAD, echocardiogram 10/19/2017 mostly unremarkable other than diastolic dysfunction.        He does have erectile dysfunction, not concerned at this time, previously prescribed Viagra.     Has been experiencing eye itching/watering and sneezing. Using generic Zyrtec, and Singulair. Allergic to grass. Improved with trial of allergy eye drops and Flonase.     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 Travel to [place] between December and April.  Remote tobacco use. No alcohol use.  Plays golf.     Providers:  Orthopedic surgery-Dr. Chi  Hematology oncology-Dr. Soto  Dermatology-Dr. Tye Preston  Spine surgery-Dr. Grzegorz Gaona, history of lumbar fusion  General surgery-Dr. Spicer ventral hernia repair  GI-Dr. Hay, colonoscopy and Gonzalez's esophagus   Patient Care Team:  Drake Crow MD as PCP - General  Drake Crow MD as PCP [facility]ributed [facility]     Review of Systems    Objective   Vitals:  /69   Pulse 73   Temp 36.3 °C (97.3 °F)   Ht 1.829 m (6')   Wt 129 kg (284 lb)   SpO2 95%   BMI 38.52 kg/m²       Physical Exam  Gen: NAD, pleasant, A&;Ox3  HEENT: PERRL, EOMI, MMM, OP clear  Neck: supple, no thyromegaly, no JVD, normal carotid upstroke  Pulm: lungs CTAB, good air movement  CV: RRR, no m/r/g, 2+ DP pulses  Abd: NABS, soft, NT, ND no HSM  Ext: 1+ bilateral lower extremity peripheral edema  Neuro: CN II-XII intact, no focal sensory or motor deficits, normal reflexes, slight issues with balance especially with turns    Assessment/Plan     BETY: Moderate with nocturnal hypoxia, 100% compliance on CPAP; using and benefitting from  treatment; continue; s/p nasal surgery in 10/2022      Obesity: With prediabetes/IFG, trial of GLP-1 agonist with good response to therapy: 16 pounds since starting, he wants to continue despite side effects     Increased reflux, and hx of Gonzalez's: improved with Change PPI to omeprazole twice daily, refer back to GI if needed, last Upper endoscopy in 2022, no mention of Gonzalez's on endoscopy or pathology result     Hypertension: Adequately controlled on current regimen (based on home BP), monitor, continue amlodipine 10 mg and losartan-HCTZ 100-25 mg; work on CV exercise ( avg 30min/day on stationary recumbent bike)     CAD/hyperlipidemia: High risk, optimize risk factors and cholesterol levels, currently on atorvastatin 40 mg and aspirin 81 mg  - vascular duplex in 2021 showed no aneurysm     BPH and PSA elevation: now seeing Dr. Hines           Health maintenance  -Last colonoscopy: 10/2018, repeat 5 years,  referred back to Dr. Stevens  -PSA: requests continued PSA screening (family hx), 1.65 ng/dL, 6/16/2020  -Smoking history: Remote, negative AAA JAN 2021  -Counseled regarding diet and exercise  -Immunizations: annual flu, COVID x4, o/w utd  -Followup in 3-4 months  Problem List Items Addressed This Visit    None          Yes

## 2025-06-04 NOTE — H&P PST ADULT - ASSESSMENT
DASI score: 8.2 METS  DASI activity: Active, walks for exercise, can climb stairs, carry groceries without CP or SOB  Loose teeth or dentures: denies   Airway: MP 1    CAPRINI SCORE    AGE RELATED RISK FACTORS                                                             [ ] Age 41-60 years                                            (1 Point)  [x ] Age: 61-74 years                                           (2 Points)                 [ ] Age= 75 years                                                (3 Points)             DISEASE RELATED RISK FACTORS                                                       [ ] Edema in the lower extremities                 (1 Point)                     [ ] Varicose veins                                               (1 Point)                                 [ ] BMI > 25 Kg/m2                                            (1 Point)                                  [ ] Serious infection (ie PNA)                            (1 Point)                     [ ] Lung disease ( COPD, Emphysema)            (1 Point)                                                                          [ ] Acute myocardial infarction                         (1 Point)                  [ ] Congestive heart failure (in the previous month)  (1 Point)         [ ] Inflammatory bowel disease                            (1 Point)                  [ ] Central venous access, PICC or Port               (2 points)       (within the last month)                                                                [ ] Stroke (in the previous month)                        (5 Points)    [ x] Previous or present malignancy                       (2 points)                                                                                                                                                         HEMATOLOGY RELATED FACTORS                                                         [ ] Prior episodes of VTE                                     (3 Points)                     [ ] Positive family history for VTE                      (3 Points)                  [ ] Prothrombin 85894 A                                     (3 Points)                     [ ] Factor V Leiden                                                (3 Points)                        [ ] Lupus anticoagulants                                      (3 Points)                                                           [ ] Anticardiolipin antibodies                              (3 Points)                                                       [ ] High homocysteine in the blood                   (3 Points)                                             [ ] Other congenital or acquired thrombophilia      (3 Points)                                                [ ] Heparin induced thrombocytopenia                  (3 Points)                                        MOBILITY RELATED FACTORS  [ ] Bed rest                                                         (1 Point)  [ ] Plaster cast                                                    (2 points)  [ ] Bed bound for more than 72 hours           (2 Points)    GENDER SPECIFIC FACTORS  [ ] Pregnancy or had a baby within the last month   (1 Point)  [ ] Post-partum < 6 weeks                                   (1 Point)  [ ] Hormonal therapy  or oral contraception   (1 Point)  [ ] History of pregnancy complications              (1 point)  [ ] Unexplained or recurrent              (1 Point)    OTHER RISK FACTORS                                           (1 Point)  [ ] BMI >40, smoking, diabetes requiring insulin, chemotherapy  blood transfusions and length of surgery over 2 hours    SURGERY RELATED RISK FACTORS  [ ]  Section within the last month     (1 Point)  [ ] Minor surgery                                                  (1 Point)  [ ] Arthroscopic surgery                                       (2 Points)  [x ] Planned major surgery lasting more            (2 Points)      than 45 minutes     [ ] Elective hip or knee joint replacement       (5 points)       surgery                                                TRAUMA RELATED RISK FACTORS  [ ] Fracture of the hip, pelvis, or leg                       (5 Points)  [ ] Spinal cord injury resulting in paralysis             (5 points)       (in the previous month)    [ ] Paralysis  (less than 1 month)                             (5 Points)  [ ] Multiple Trauma within 1 month                        (5 Points)    Total Score [   6     ]    Caprini Score 0-2: Low Risk, NO VTE prophylaxis required for most patients, encourage ambulation  Caprini Score 3-6: Moderate Risk , pharmacologic VTE prophylaxis is indicated for most patients (in the absence of contraindications)  Caprini Score Greater than or =7: High risk, pharmocologic VTE prophylaxis indicated for most patients (in the absence of contraindications)

## 2025-06-04 NOTE — H&P PST ADULT - HISTORY OF PRESENT ILLNESS
59 yo M with no PMH, diagnosed with adenocarcinoma, S/P Laparoscopic lower anterior resection on 1/30/24 with Dr. Alegre and a transurethral resection of large bladder tumor by Dr. Malcolm, undergoing BCG therapy. He underwent chemotherapy via right chest wall Mediport (last dose in 10/24, followed by Onc).  S/P Cystoscopy, w/ Bladder Tumor Removal on 3/6/2025. New biopsy of colorectal anastomosis on 2/19/2025: adenocarcinoma, moderately-differentiated, he underwent  redo anterior resection with diverting ileostomy on 3/25/25.  Plan for ERP, ileostomy closure on 6/23/25 with Dr. Alegre.

## 2025-06-04 NOTE — H&P PST ADULT - NEGATIVE GASTROINTESTINAL SYMPTOMS
(+) ileostomy with mostly liquid stool/no nausea/no vomiting/no diarrhea/no constipation/no change in bowel habits/no abdominal pain

## 2025-06-04 NOTE — H&P PST ADULT - PROBLEM SELECTOR PLAN 1
Plan for ERP, ileostomy closure on 6/23/25 with Dr. Alegre.   PST labs sent per ERP protocol  Pre procedure surgical scrub instructions discussed  Pre-op education provided - all questions answered

## 2025-06-04 NOTE — H&P PST ADULT - CARDIOVASCULAR
-- DO NOT REPLY / DO NOT REPLY ALL --  -- Message is from [facility]agement Center [facility] ([facility]) --    General Patient Message: patient returning phone call for test results      Caller Information       Type Contact Phone/Fax    11/10/2023 11:03 AM CST Phone (Outgoing) Jeannie Thomas (Self) [phone] (M)    Left Message     11/10/2023 01:28 PM CST Phone (Incoming) Jeannie Thomas (Self) [phone] (M)        Alternative phone number: no    Can a detailed message be left? Yes    Message Turnaround: [place]:    Refer to site's KB page for routing instructions    Please give this turnaround time to the caller:   \"You can expect to receive a response 2-3 business days after your provider's clinical team reviews the message\"               regular rate and rhythm/S1 S2 present/no murmur details…

## 2025-06-09 DIAGNOSIS — C67.8 MALIGNANT NEOPLASM OF OVERLAPPING SITES OF BLADDER: ICD-10-CM

## 2025-06-11 ENCOUNTER — APPOINTMENT (OUTPATIENT)
Dept: UROLOGY | Facility: CLINIC | Age: 60
End: 2025-06-11
Payer: COMMERCIAL

## 2025-06-11 ENCOUNTER — OUTPATIENT (OUTPATIENT)
Dept: OUTPATIENT SERVICES | Facility: HOSPITAL | Age: 60
LOS: 1 days | End: 2025-06-11
Payer: COMMERCIAL

## 2025-06-11 VITALS
DIASTOLIC BLOOD PRESSURE: 79 MMHG | TEMPERATURE: 97.8 F | SYSTOLIC BLOOD PRESSURE: 152 MMHG | RESPIRATION RATE: 16 BRPM | OXYGEN SATURATION: 100 % | HEART RATE: 72 BPM

## 2025-06-11 DIAGNOSIS — Z98.890 OTHER SPECIFIED POSTPROCEDURAL STATES: Chronic | ICD-10-CM

## 2025-06-11 DIAGNOSIS — R35.0 FREQUENCY OF MICTURITION: ICD-10-CM

## 2025-06-11 DIAGNOSIS — Z93.2 ILEOSTOMY STATUS: Chronic | ICD-10-CM

## 2025-06-11 DIAGNOSIS — Z90.49 ACQUIRED ABSENCE OF OTHER SPECIFIED PARTS OF DIGESTIVE TRACT: Chronic | ICD-10-CM

## 2025-06-11 PROCEDURE — 51720 TREATMENT OF BLADDER LESION: CPT

## 2025-06-11 RX ORDER — BACILLUS CALMETTE-GUERIN 50 MG/50ML
50 POWDER, FOR SUSPENSION INTRAVESICAL
Refills: 0 | Status: COMPLETED | OUTPATIENT
Start: 2025-06-11

## 2025-06-11 RX ADMIN — BACILLUS CALMETTE-GUERIN 0 MG: 50 POWDER, FOR SUSPENSION INTRAVESICAL at 00:00

## 2025-06-13 DIAGNOSIS — C67.8 MALIGNANT NEOPLASM OF OVERLAPPING SITES OF BLADDER: ICD-10-CM

## 2025-06-18 ENCOUNTER — APPOINTMENT (OUTPATIENT)
Dept: UROLOGY | Facility: CLINIC | Age: 60
End: 2025-06-18

## 2025-06-18 ENCOUNTER — OUTPATIENT (OUTPATIENT)
Dept: OUTPATIENT SERVICES | Facility: HOSPITAL | Age: 60
LOS: 1 days | End: 2025-06-18
Payer: COMMERCIAL

## 2025-06-18 VITALS
SYSTOLIC BLOOD PRESSURE: 145 MMHG | OXYGEN SATURATION: 100 % | HEART RATE: 77 BPM | RESPIRATION RATE: 15 BRPM | DIASTOLIC BLOOD PRESSURE: 80 MMHG

## 2025-06-18 DIAGNOSIS — Z98.890 OTHER SPECIFIED POSTPROCEDURAL STATES: Chronic | ICD-10-CM

## 2025-06-18 DIAGNOSIS — Z93.2 ILEOSTOMY STATUS: Chronic | ICD-10-CM

## 2025-06-18 DIAGNOSIS — R35.0 FREQUENCY OF MICTURITION: ICD-10-CM

## 2025-06-18 DIAGNOSIS — C67.8 MALIGNANT NEOPLASM OF OVERLAPPING SITES OF BLADDER: ICD-10-CM

## 2025-06-18 PROCEDURE — 51720 TREATMENT OF BLADDER LESION: CPT

## 2025-06-18 RX ORDER — BACILLUS CALMETTE-GUERIN 50 MG/50ML
50 POWDER, FOR SUSPENSION INTRAVESICAL
Refills: 0 | Status: COMPLETED | OUTPATIENT
Start: 2025-06-18

## 2025-06-20 ENCOUNTER — APPOINTMENT (OUTPATIENT)
Dept: RADIOLOGY | Facility: HOSPITAL | Age: 60
End: 2025-06-20

## 2025-06-20 ENCOUNTER — RESULT REVIEW (OUTPATIENT)
Age: 60
End: 2025-06-20

## 2025-06-20 ENCOUNTER — OUTPATIENT (OUTPATIENT)
Dept: OUTPATIENT SERVICES | Facility: HOSPITAL | Age: 60
LOS: 1 days | End: 2025-06-20
Payer: COMMERCIAL

## 2025-06-20 DIAGNOSIS — Z98.890 OTHER SPECIFIED POSTPROCEDURAL STATES: Chronic | ICD-10-CM

## 2025-06-20 DIAGNOSIS — C18.9 MALIGNANT NEOPLASM OF COLON, UNSPECIFIED: ICD-10-CM

## 2025-06-20 DIAGNOSIS — Z93.2 ILEOSTOMY STATUS: Chronic | ICD-10-CM

## 2025-06-20 DIAGNOSIS — Z90.49 ACQUIRED ABSENCE OF OTHER SPECIFIED PARTS OF DIGESTIVE TRACT: Chronic | ICD-10-CM

## 2025-06-20 PROCEDURE — 74270 X-RAY XM COLON 1CNTRST STD: CPT

## 2025-06-20 PROCEDURE — 74270 X-RAY XM COLON 1CNTRST STD: CPT | Mod: 26

## 2025-06-24 ENCOUNTER — INPATIENT (INPATIENT)
Facility: HOSPITAL | Age: 60
LOS: 0 days | Discharge: HOME | DRG: 395 | End: 2025-06-25
Attending: SURGERY | Admitting: SURGERY
Payer: COMMERCIAL

## 2025-06-24 ENCOUNTER — APPOINTMENT (OUTPATIENT)
Dept: COLORECTAL SURGERY | Facility: HOSPITAL | Age: 60
End: 2025-06-24
Payer: COMMERCIAL

## 2025-06-24 ENCOUNTER — RESULT REVIEW (OUTPATIENT)
Age: 60
End: 2025-06-24

## 2025-06-24 ENCOUNTER — TRANSCRIPTION ENCOUNTER (OUTPATIENT)
Age: 60
End: 2025-06-24

## 2025-06-24 VITALS
HEIGHT: 66.54 IN | OXYGEN SATURATION: 100 % | SYSTOLIC BLOOD PRESSURE: 152 MMHG | TEMPERATURE: 98 F | RESPIRATION RATE: 20 BRPM | WEIGHT: 145.95 LBS | HEART RATE: 66 BPM | DIASTOLIC BLOOD PRESSURE: 92 MMHG

## 2025-06-24 DIAGNOSIS — Z90.49 ACQUIRED ABSENCE OF OTHER SPECIFIED PARTS OF DIGESTIVE TRACT: Chronic | ICD-10-CM

## 2025-06-24 DIAGNOSIS — Z98.890 OTHER SPECIFIED POSTPROCEDURAL STATES: Chronic | ICD-10-CM

## 2025-06-24 DIAGNOSIS — Z93.2 ILEOSTOMY STATUS: Chronic | ICD-10-CM

## 2025-06-24 DIAGNOSIS — K63.89 OTHER SPECIFIED DISEASES OF INTESTINE: ICD-10-CM

## 2025-06-24 LAB — GLUCOSE BLDC GLUCOMTR-MCNC: 98 MG/DL — SIGNIFICANT CHANGE UP (ref 70–99)

## 2025-06-24 PROCEDURE — 86900 BLOOD TYPING SEROLOGIC ABO: CPT

## 2025-06-24 PROCEDURE — 86901 BLOOD TYPING SEROLOGIC RH(D): CPT

## 2025-06-24 PROCEDURE — 88304 TISSUE EXAM BY PATHOLOGIST: CPT | Mod: 26

## 2025-06-24 PROCEDURE — 86850 RBC ANTIBODY SCREEN: CPT

## 2025-06-24 PROCEDURE — 82962 GLUCOSE BLOOD TEST: CPT

## 2025-06-24 PROCEDURE — 44625 REPAIR BOWEL OPENING: CPT

## 2025-06-24 DEVICE — STAPLER ETHICON PROXIMATE 75MM WITH BLUE RELOAD: Type: IMPLANTABLE DEVICE | Status: FUNCTIONAL

## 2025-06-24 DEVICE — STAPLER COVIDIEN TA 90 BLUE: Type: IMPLANTABLE DEVICE | Status: FUNCTIONAL

## 2025-06-24 RX ORDER — OXYCODONE HYDROCHLORIDE 30 MG/1
5 TABLET ORAL EVERY 4 HOURS
Refills: 0 | Status: DISCONTINUED | OUTPATIENT
Start: 2025-06-24 | End: 2025-06-25

## 2025-06-24 RX ORDER — OXYCODONE HYDROCHLORIDE 30 MG/1
10 TABLET ORAL
Refills: 0 | Status: DISCONTINUED | OUTPATIENT
Start: 2025-06-24 | End: 2025-06-24

## 2025-06-24 RX ORDER — HEPARIN SODIUM 1000 [USP'U]/ML
5000 INJECTION INTRAVENOUS; SUBCUTANEOUS EVERY 8 HOURS
Refills: 0 | Status: DISCONTINUED | OUTPATIENT
Start: 2025-06-24 | End: 2025-06-25

## 2025-06-24 RX ORDER — OXYCODONE HYDROCHLORIDE 30 MG/1
2.5 TABLET ORAL EVERY 4 HOURS
Refills: 0 | Status: DISCONTINUED | OUTPATIENT
Start: 2025-06-24 | End: 2025-06-25

## 2025-06-24 RX ORDER — HYDROMORPHONE/SOD CHLOR,ISO/PF 2 MG/10 ML
0.5 SYRINGE (ML) INJECTION ONCE
Refills: 0 | Status: DISCONTINUED | OUTPATIENT
Start: 2025-06-24 | End: 2025-06-25

## 2025-06-24 RX ORDER — LIDOCAINE HCL/PF 20 MG/ML
0.2 AMPUL, LUER TIP INJECTION ONCE
Refills: 0 | Status: DISCONTINUED | OUTPATIENT
Start: 2025-06-24 | End: 2025-06-24

## 2025-06-24 RX ORDER — OXYCODONE HYDROCHLORIDE 30 MG/1
5 TABLET ORAL
Refills: 0 | Status: DISCONTINUED | OUTPATIENT
Start: 2025-06-24 | End: 2025-06-24

## 2025-06-24 RX ORDER — NALOXONE HYDROCHLORIDE 0.4 MG/ML
0.1 INJECTION, SOLUTION INTRAMUSCULAR; INTRAVENOUS; SUBCUTANEOUS
Refills: 0 | Status: DISCONTINUED | OUTPATIENT
Start: 2025-06-24 | End: 2025-06-24

## 2025-06-24 RX ORDER — CELECOXIB 50 MG/1
400 CAPSULE ORAL ONCE
Refills: 0 | Status: COMPLETED | OUTPATIENT
Start: 2025-06-24 | End: 2025-06-24

## 2025-06-24 RX ORDER — ACETAMINOPHEN 500 MG/5ML
1000 LIQUID (ML) ORAL EVERY 6 HOURS
Refills: 0 | Status: COMPLETED | OUTPATIENT
Start: 2025-06-24 | End: 2025-06-25

## 2025-06-24 RX ORDER — SODIUM CHLORIDE 9 G/1000ML
1000 INJECTION, SOLUTION INTRAVENOUS
Refills: 0 | Status: DISCONTINUED | OUTPATIENT
Start: 2025-06-24 | End: 2025-06-25

## 2025-06-24 RX ORDER — ONDANSETRON HCL/PF 4 MG/2 ML
4 VIAL (ML) INJECTION EVERY 6 HOURS
Refills: 0 | Status: DISCONTINUED | OUTPATIENT
Start: 2025-06-24 | End: 2025-06-24

## 2025-06-24 RX ORDER — SODIUM CHLORIDE 9 G/1000ML
1000 INJECTION, SOLUTION INTRAVENOUS
Refills: 0 | Status: DISCONTINUED | OUTPATIENT
Start: 2025-06-24 | End: 2025-06-24

## 2025-06-24 RX ADMIN — OXYCODONE HYDROCHLORIDE 5 MILLIGRAM(S): 30 TABLET ORAL at 11:04

## 2025-06-24 RX ADMIN — SODIUM CHLORIDE 40 MILLILITER(S): 9 INJECTION, SOLUTION INTRAVENOUS at 19:32

## 2025-06-24 RX ADMIN — HEPARIN SODIUM 5000 UNIT(S): 1000 INJECTION INTRAVENOUS; SUBCUTANEOUS at 17:31

## 2025-06-24 RX ADMIN — Medication 1000 MILLIGRAM(S): at 21:31

## 2025-06-24 RX ADMIN — CELECOXIB 400 MILLIGRAM(S): 50 CAPSULE ORAL at 07:22

## 2025-06-24 RX ADMIN — Medication 400 MILLIGRAM(S): at 21:01

## 2025-06-24 RX ADMIN — Medication 1000 MILLIGRAM(S): at 16:30

## 2025-06-24 RX ADMIN — OXYCODONE HYDROCHLORIDE 5 MILLIGRAM(S): 30 TABLET ORAL at 11:36

## 2025-06-24 RX ADMIN — Medication 400 MILLIGRAM(S): at 16:01

## 2025-06-24 NOTE — PROGRESS NOTE ADULT - ASSESSMENT
ASSESSMENT/PLAN: 61 yo M with no PMH, diagnosed with adenocarcinoma now s/p ileostomy reversal 6/24.    - ERAS protocol  - diet: clears ADAT  - morales in place  - await return of GI fxn  - pain control  - AM labs    [facility]  [id number]

## 2025-06-24 NOTE — PACU DISCHARGE NOTE - COMMENTS
Patient to be discharged to floor with O2 via NC at 2 L/M and continuous pulse oximetry due to intrathecal Duramorph.

## 2025-06-24 NOTE — BRIEF OPERATIVE NOTE - OPERATION/FINDINGS
Ileostomy sharply taken down from abdominal wall. Fascia and skin opened superiorly in order to aide in dissection. Side to side functional end to end anastomosis created using AUBREE 75mm blue stapler and TA 90mm blue stapler in Ari fashion and ileostomy resected. Anastomosis easily reduced intraabdominally. Fascia closed using #1 maxon interrupted figure of eights. Superior skin incision closed using staples. Telfa packing placed in former stoma site.

## 2025-06-25 ENCOUNTER — TRANSCRIPTION ENCOUNTER (OUTPATIENT)
Age: 60
End: 2025-06-25

## 2025-06-25 VITALS
OXYGEN SATURATION: 98 % | TEMPERATURE: 98 F | HEART RATE: 66 BPM | DIASTOLIC BLOOD PRESSURE: 73 MMHG | SYSTOLIC BLOOD PRESSURE: 135 MMHG | RESPIRATION RATE: 18 BRPM

## 2025-06-25 LAB
ANION GAP SERPL CALC-SCNC: 10 MMOL/L — SIGNIFICANT CHANGE UP (ref 5–17)
ANION GAP SERPL CALC-SCNC: 11 MMOL/L — SIGNIFICANT CHANGE UP (ref 5–17)
BUN SERPL-MCNC: 17 MG/DL — SIGNIFICANT CHANGE UP (ref 7–23)
BUN SERPL-MCNC: 18 MG/DL — SIGNIFICANT CHANGE UP (ref 7–23)
CALCIUM SERPL-MCNC: 8.7 MG/DL — SIGNIFICANT CHANGE UP (ref 8.4–10.5)
CALCIUM SERPL-MCNC: 9 MG/DL — SIGNIFICANT CHANGE UP (ref 8.4–10.5)
CHLORIDE SERPL-SCNC: 102 MMOL/L — SIGNIFICANT CHANGE UP (ref 96–108)
CHLORIDE SERPL-SCNC: 104 MMOL/L — SIGNIFICANT CHANGE UP (ref 96–108)
CO2 SERPL-SCNC: 22 MMOL/L — SIGNIFICANT CHANGE UP (ref 22–31)
CO2 SERPL-SCNC: 23 MMOL/L — SIGNIFICANT CHANGE UP (ref 22–31)
CREAT SERPL-MCNC: 1.15 MG/DL — SIGNIFICANT CHANGE UP (ref 0.5–1.3)
CREAT SERPL-MCNC: 1.34 MG/DL — HIGH (ref 0.5–1.3)
EGFR: 60 ML/MIN/1.73M2 — SIGNIFICANT CHANGE UP
EGFR: 60 ML/MIN/1.73M2 — SIGNIFICANT CHANGE UP
EGFR: 72 ML/MIN/1.73M2 — SIGNIFICANT CHANGE UP
EGFR: 72 ML/MIN/1.73M2 — SIGNIFICANT CHANGE UP
GLUCOSE SERPL-MCNC: 127 MG/DL — HIGH (ref 70–99)
GLUCOSE SERPL-MCNC: 92 MG/DL — SIGNIFICANT CHANGE UP (ref 70–99)
HCT VFR BLD CALC: 39.6 % — SIGNIFICANT CHANGE UP (ref 39–50)
HGB BLD-MCNC: 12.7 G/DL — LOW (ref 13–17)
MAGNESIUM SERPL-MCNC: 2 MG/DL — SIGNIFICANT CHANGE UP (ref 1.6–2.6)
MAGNESIUM SERPL-MCNC: 2.2 MG/DL — SIGNIFICANT CHANGE UP (ref 1.6–2.6)
MCHC RBC-ENTMCNC: 28.1 PG — SIGNIFICANT CHANGE UP (ref 27–34)
MCHC RBC-ENTMCNC: 32.1 G/DL — SIGNIFICANT CHANGE UP (ref 32–36)
MCV RBC AUTO: 87.6 FL — SIGNIFICANT CHANGE UP (ref 80–100)
NRBC # BLD AUTO: 0 K/UL — SIGNIFICANT CHANGE UP (ref 0–0)
NRBC # FLD: 0 K/UL — SIGNIFICANT CHANGE UP (ref 0–0)
NRBC BLD AUTO-RTO: 0 /100 WBCS — SIGNIFICANT CHANGE UP (ref 0–0)
PHOSPHATE SERPL-MCNC: 2.2 MG/DL — LOW (ref 2.5–4.5)
PHOSPHATE SERPL-MCNC: 3.1 MG/DL — SIGNIFICANT CHANGE UP (ref 2.5–4.5)
PLATELET # BLD AUTO: 366 K/UL — SIGNIFICANT CHANGE UP (ref 150–400)
PMV BLD: 9.5 FL — SIGNIFICANT CHANGE UP (ref 7–13)
POTASSIUM SERPL-MCNC: 3.5 MMOL/L — SIGNIFICANT CHANGE UP (ref 3.5–5.3)
POTASSIUM SERPL-MCNC: 4.1 MMOL/L — SIGNIFICANT CHANGE UP (ref 3.5–5.3)
POTASSIUM SERPL-SCNC: 3.5 MMOL/L — SIGNIFICANT CHANGE UP (ref 3.5–5.3)
POTASSIUM SERPL-SCNC: 4.1 MMOL/L — SIGNIFICANT CHANGE UP (ref 3.5–5.3)
RBC # BLD: 4.52 M/UL — SIGNIFICANT CHANGE UP (ref 4.2–5.8)
RBC # FLD: 13.1 % — SIGNIFICANT CHANGE UP (ref 10.3–14.5)
SODIUM SERPL-SCNC: 135 MMOL/L — SIGNIFICANT CHANGE UP (ref 135–145)
SODIUM SERPL-SCNC: 137 MMOL/L — SIGNIFICANT CHANGE UP (ref 135–145)
WBC # BLD: 12.85 K/UL — HIGH (ref 3.8–10.5)
WBC # FLD AUTO: 12.85 K/UL — HIGH (ref 3.8–10.5)

## 2025-06-25 PROCEDURE — 84100 ASSAY OF PHOSPHORUS: CPT

## 2025-06-25 PROCEDURE — 80048 BASIC METABOLIC PNL TOTAL CA: CPT

## 2025-06-25 PROCEDURE — 86901 BLOOD TYPING SEROLOGIC RH(D): CPT

## 2025-06-25 PROCEDURE — C9399: CPT

## 2025-06-25 PROCEDURE — 97161 PT EVAL LOW COMPLEX 20 MIN: CPT

## 2025-06-25 PROCEDURE — 85027 COMPLETE CBC AUTOMATED: CPT

## 2025-06-25 PROCEDURE — 83735 ASSAY OF MAGNESIUM: CPT

## 2025-06-25 PROCEDURE — 86900 BLOOD TYPING SEROLOGIC ABO: CPT

## 2025-06-25 PROCEDURE — 86850 RBC ANTIBODY SCREEN: CPT

## 2025-06-25 PROCEDURE — 82962 GLUCOSE BLOOD TEST: CPT

## 2025-06-25 PROCEDURE — 88304 TISSUE EXAM BY PATHOLOGIST: CPT

## 2025-06-25 PROCEDURE — C1889: CPT

## 2025-06-25 RX ORDER — SOD PHOS DI, MONO/K PHOS MONO 250 MG
2 TABLET ORAL ONCE
Refills: 0 | Status: COMPLETED | OUTPATIENT
Start: 2025-06-25 | End: 2025-06-25

## 2025-06-25 RX ORDER — OXYCODONE HYDROCHLORIDE 30 MG/1
1 TABLET ORAL
Qty: 5 | Refills: 0
Start: 2025-06-25

## 2025-06-25 RX ORDER — ACETAMINOPHEN 500 MG/5ML
1000 LIQUID (ML) ORAL EVERY 6 HOURS
Refills: 0 | Status: DISCONTINUED | OUTPATIENT
Start: 2025-06-25 | End: 2025-06-25

## 2025-06-25 RX ORDER — ACETAMINOPHEN 500 MG/5ML
2 LIQUID (ML) ORAL
Qty: 0 | Refills: 0 | DISCHARGE
Start: 2025-06-25

## 2025-06-25 RX ADMIN — HEPARIN SODIUM 5000 UNIT(S): 1000 INJECTION INTRAVENOUS; SUBCUTANEOUS at 09:04

## 2025-06-25 RX ADMIN — Medication 1000 MILLIGRAM(S): at 03:55

## 2025-06-25 RX ADMIN — Medication 2 PACKET(S): at 15:49

## 2025-06-25 RX ADMIN — OXYCODONE HYDROCHLORIDE 5 MILLIGRAM(S): 30 TABLET ORAL at 13:29

## 2025-06-25 RX ADMIN — Medication 1000 MILLIGRAM(S): at 09:34

## 2025-06-25 RX ADMIN — Medication 1000 MILLIGRAM(S): at 13:58

## 2025-06-25 RX ADMIN — HEPARIN SODIUM 5000 UNIT(S): 1000 INJECTION INTRAVENOUS; SUBCUTANEOUS at 00:03

## 2025-06-25 RX ADMIN — OXYCODONE HYDROCHLORIDE 5 MILLIGRAM(S): 30 TABLET ORAL at 14:25

## 2025-06-25 RX ADMIN — Medication 1000 MILLIGRAM(S): at 13:28

## 2025-06-25 RX ADMIN — Medication 400 MILLIGRAM(S): at 03:25

## 2025-06-25 RX ADMIN — Medication 400 MILLIGRAM(S): at 09:04

## 2025-06-25 NOTE — DISCHARGE NOTE PROVIDER - DISCHARGE SERVICE FOR PATIENT
[FreeTextEntry1] : Patient returns to clinic for follow up on pain of the dorsum of his right foot. Patient states that the pain was relieved for 4 weeks because of the injection given to him at the last visit. Patient is interested in another injection at the same site.pt states that injection is helping at this time and no surgical intervention at this time. Patient denies NVFC and denies SOB at this time. Pt. denies any other pedal complaints at this time. 
on the discharge service for the patient. I have reviewed and made amendments to the documentation where necessary.

## 2025-06-25 NOTE — PHYSICAL THERAPY INITIAL EVALUATION ADULT - MD ORDER
PT Name: Scooter Morrissey  MR #: [id number]     Documentation Clarification      CDS/: Esmer Garcia RN CDI         Contact information: [email or web address]    This form is a permanent document in the medical record.     Query Date: March 14, 2022    By submitting this query, we are merely seeking further clarification of documentation. Please utilize your independent clinical judgment when addressing the question(s) below.    The Medical Record reflects the following:    Supporting Clinical Findings Location in Medical Record   Resp cx growing MRSA and cipro resistant pseudomonas [facility] [address] [facility]/  LIYAH James MD   Multifocal pneumonia ed 3/[address] [facility]/  LIYAH James MD                                                                            Provider, please provide diagnosis or diagnoses associated with above clinical findings.    [x] Pneumonia due to MRSA and Pseudomonas.     [   ] Pneumonia due to MRSA.     [   ] Pneumonia due to Pseudomonas.     [   ] Other (please specify): ____________.     [  ] Clinically undetermined                                                                                                                       
PT: Evaluate and Treat

## 2025-06-25 NOTE — SBIRT NOTE ADULT - NSSBIRTUNABLESTOP_GEN_A_CORE
Never
73
I will START or STAY ON the medications listed below when I get home from the hospital:    aspirin 81 mg oral tablet  -- 1 tab(s) by mouth once a day  -- Indication: For CAD    Tylenol Caplet Extra Strength 500 mg oral tablet  -- 1 tab(s) by mouth every 6 hours, As Needed  -- Indication: For PAIN    oxyCODONE 5 mg oral tablet  -- 1-2 tab(s) by mouth every 4 hours as needed for pain  -- Indication: For MODERATE PAIN    DilTIAZem Hydrochloride  mg/24 hours oral capsule, extended release  -- 1 cap(s) by mouth once a day  -- Indication: For Afib    apixaban 5 mg oral tablet  -- 1 tab(s) by mouth 2 times a day  -- Indication: For Afib    Acidophilus oral capsule  -- 1 cap(s) by mouth once a day  -- Indication: For PREVENTION OF DIARRHEA    Levaquin 500 mg oral tablet  -- 1 tab(s) by mouth once a day  -- Avoid prolonged or excessive exposure to direct and/or artificial sunlight while taking this medication.  Do not take dairy products, antacids, or iron preparations within one hour of this medication.  Finish all this medication unless otherwise directed by prescriber.  May cause drowsiness or dizziness.  Medication should be taken with plenty of water.    -- Indication: For Pneumonia    minocycline 100 mg oral tablet  -- 1 tab(s) by mouth 2 times a day  -- Indication: For PROPYLAXISIS    multivitamin  -- 1 tab(s) by mouth once a day  -- Indication: For SUPPLEMENT    Vitamin B-12 1000 mcg oral tablet  -- 1 tab(s) by mouth once a day  -- Indication: For SUPPLEMENT

## 2025-06-25 NOTE — PHYSICAL THERAPY INITIAL EVALUATION ADULT - ADDITIONAL COMMENTS
Pt resides with his spouse and kids in a house with +1 steps to enter and 1 flight of stairs to bedroom. PTA, pt was independent with mobility/ADLs without the use of DME.

## 2025-06-25 NOTE — DISCHARGE NOTE NURSING/CASE MANAGEMENT/SOCIAL WORK - NSDCPEFALRISK_GEN_ALL_CORE
For information on Fall & Injury Prevention, visit: [email or web address] OR  [email or web address] OR  https://www.cdc.gov/steadi/patient.html

## 2025-06-25 NOTE — PROGRESS NOTE ADULT - SUBJECTIVE AND OBJECTIVE BOX
Day 1 of Anesthesia Pain Management Service    SUBJECTIVE:  Pain Scale Score:          [X] Refer to charted pain scores    THERAPY: Received PF spinal morphine as above    OBJECTIVE:    Sedation:        	[X] Alert	[ ] Drowsy	[ ] Arousable      [ ] Asleep       [ ] Unresponsive    Side Effects:	[X] None	[ ] Nausea	[ ] Vomiting         [ ] Pruritus  		[ ] Weakness            [ ] Numbness	          [ ] Other:    ASSESSMENT/ PLAN  [X] Patient transitioned to prn analgesics  [X] Pain management per primary service, pain service to sign off   [X]Documentation and Verification of current medications
Day 1 of Anesthesia Pain Management Service    SUBJECTIVE: Doing ok  Pain Scale Score:          [X] Refer to charted pain scores    THERAPY:    s/p    250 mcg PF morphine on 6\24\25       MEDICATIONS  (STANDING):  acetaminophen     Tablet .. 1000 milliGRAM(s) Oral every 6 hours  heparin   Injectable 5000 Unit(s) SubCutaneous every 8 hours  HYDROmorphone  Injectable 0.5 milliGRAM(s) IV Push once    MEDICATIONS  (PRN):  oxyCODONE    IR 2.5 milliGRAM(s) Oral every 4 hours PRN Moderate Pain (4 - 6)  oxyCODONE    IR 5 milliGRAM(s) Oral every 4 hours PRN Severe Pain (7 - 10)      OBJECTIVE:    Sedation:        	[X] Alert	 [ ] Drowsy	[ ] Arousable      [ ] Asleep       [ ] Unresponsive    Side Effects:	[X] None 	[ ] Nausea	[ ] Vomiting         [ ] Pruritus  		[ ] Weakness            [ ] Numbness	          [ ] Other:    Vital Signs Last 24 Hrs  T(C): 36.8 (25 Jun 2025 09:08), Max: 37.1 (24 Jun 2025 21:20)  T(F): 98.2 (25 Jun 2025 09:08), Max: 98.7 (24 Jun 2025 21:20)  HR: 68 (25 Jun 2025 09:08) (57 - 78)  BP: 135/68 (25 Jun 2025 09:08) (104/65 - 144/78)  BP(mean): 97 (24 Jun 2025 19:00) (83 - 105)  RR: 18 (25 Jun 2025 09:08) (15 - 18)  SpO2: 100% (25 Jun 2025 09:08) (96% - 100%)    Parameters below as of 25 Jun 2025 09:08  Patient On (Oxygen Delivery Method): room air        ASSESSMENT/ PLAN  [X] Patient to be transitioned to prn analgesics after 24 hours  [X] Pain management per primary service, pain service to sign off   [X]Documentation and Verification of current medications
COLORECTAL SURGERY POSTOP NOTE    STATUS POST:  Reversal, ileostomy, complex        SUBJECTIVE: Pt seen and examined at bedside. Admits to feeling well.  Tolerating clears Ambulating laps in PACU. Denies chest pain, SOB, palpitations, HA, fever, chills, N/V. No GI fxn yet.      OBJECTIVE:  Vital Signs Last 24 Hrs  T(C): 36.4 (24 Jun 2025 13:00), Max: 36.9 (24 Jun 2025 07:29)  T(F): 97.5 (24 Jun 2025 13:00), Max: 98.4 (24 Jun 2025 07:29)  HR: 62 (24 Jun 2025 15:00) (57 - 76)  BP: 117/67 (24 Jun 2025 15:00) (115/70 - 152/92)  BP(mean): 87 (24 Jun 2025 15:00) (85 - 105)  RR: 16 (24 Jun 2025 15:00) (15 - 20)  SpO2: 97% (24 Jun 2025 15:00) (96% - 100%)    Parameters below as of 24 Jun 2025 13:00  Patient On (Oxygen Delivery Method): room air        I&O's Summary    24 Jun 2025 07:01  -  24 Jun 2025 15:55  --------------------------------------------------------  IN: 430 mL / OUT: 310 mL / NET: 120 mL        Physical Exam:  General Appearance: Appears well, NAD, A& O x 3  Neck: Supple  Chest: Equal expansion bilaterally, equal breath sounds  CV: Pulse regular presently  Abdomen: Soft, nontense, appropriate incisional tenderness, dressings clean and dry and intact  Extremities: Grossly symmetric, SCD's in place     LABS:                RADIOLOGY & ADDITIONAL STUDIES:
SURGERY DAILY PROGRESS NOTE:     Overnight Events:  No acute events overnight.    SUBJECTIVE: Patient seen and evaluated on AM rounds. Pt is resting comfortably in bed with no complaints. Denies fever, chills, N/V, chest pain, or shortness of breath. Patient is passing gas and having bowel movements. Tolerating diet. Ambulating well. Pain is adequately controlled on current regimen.    OBJECTIVE:  Vital Signs Last 24 Hrs  T(C): 36.8 (25 Jun 2025 09:08), Max: 37.1 (24 Jun 2025 21:20)  T(F): 98.2 (25 Jun 2025 09:08), Max: 98.7 (24 Jun 2025 21:20)  HR: 68 (25 Jun 2025 09:08) (57 - 78)  BP: 135/68 (25 Jun 2025 09:08) (104/65 - 144/78)  BP(mean): 97 (24 Jun 2025 19:00) (83 - 105)  RR: 18 (25 Jun 2025 09:08) (15 - 18)  SpO2: 100% (25 Jun 2025 09:08) (96% - 100%)    Parameters below as of 25 Jun 2025 09:08  Patient On (Oxygen Delivery Method): room air      I&O's Detail    24 Jun 2025 07:01  -  25 Jun 2025 07:00  --------------------------------------------------------  IN:    Lactated Ringers: 840 mL    Oral Fluid: 470 mL  Total IN: 1310 mL    OUT:    Indwelling Catheter - Urethral (mL): 1070 mL  Total OUT: 1070 mL    Total NET: 240 mL      25 Jun 2025 07:01  -  25 Jun 2025 10:35  --------------------------------------------------------  IN:  Total IN: 0 mL    OUT:    Voided (mL): 100 mL  Total OUT: 100 mL    Total NET: -100 mL        Daily     Daily     LABS:                        12.7   12.85 )-----------( 366      ( 25 Jun 2025 06:32 )             39.6     06-25    135  |  102  |  17  ----------------------------<  92  4.1   |  22  |  1.34[H]    Ca    8.7      25 Jun 2025 06:33  Phos  3.1     06-25  Mg     2.0     06-25        Urinalysis Basic - ( 25 Jun 2025 06:33 )    Color: x / Appearance: x / SG: x / pH: x  Gluc: 92 mg/dL / Ketone: x  / Bili: x / Urobili: x   Blood: x / Protein: x / Nitrite: x   Leuk Esterase: x / RBC: x / WBC x   Sq Epi: x / Non Sq Epi: x / Bacteria: x        Physical Exam:  General Appearance: Appears well, NAD, A& O x 3  Neck: Supple  Chest: Equal expansion bilaterally, equal breath sounds  CV: Pulse regular presently  Abdomen: Soft, nontense, appropriate incisional tenderness, dressings clean and dry and intact  Extremities: Grossly symmetric, SCD's in place

## 2025-06-25 NOTE — DISCHARGE NOTE PROVIDER - HOSPITAL COURSE
HPI:  61 yo M with no PMH, diagnosed with adenocarcinoma, S/P Laparoscopic lower anterior resection on 1/30/24 with Dr. Alegre and a transurethral resection of large bladder tumor by Dr. Malcolm, undergoing BCG therapy. He underwent chemotherapy via right chest wall Mediport (last dose in 10/24, followed by Onc).  S/P Cystoscopy, w/ Bladder Tumor Removal on 3/6/2025. New biopsy of colorectal anastomosis on 2/19/2025: adenocarcinoma, moderately-differentiated, he underwent  redo anterior resection with diverting ileostomy on 3/25/25.  Plan for ERP, ileostomy closure on 6/23/25 with Dr. Alegre.    (04 Jun 2025 10:14)      Pt was admitted under Colorectal Surgery for planned surgical intervention. Pt was taken to the OR on 6/24/25, and is s/p ileostomy reversal. The patient tolerated the procedure well (see operative report for full details). Pt was transferred to the PACU in stable condition. In the PACU, the patient's pain was controlled and vitals stable. The patient was given clear liquids with Ensure Clears in PACU, and encouraged with early ambulation. On POC, the patient was doing well. The patient was transferred to the surgical floor in stable condition. ERP protocol was followed.     On POD #1, pt was stable and doing well. Pt's [place] was discontinued on 6/25, and passed TOV. Once IV pain control dosing complete, pt was transitioned to oral Tylenol with Oxycodone for breakthrough pain. Diet was advanced as tolerated, and GI function returned. Labs were monitored daily, and electrolytes were repleted as necessary.     Caprini score is 6; pt will not need dvt ppx for discharge.  Physical therapy evaluated the patient and recommended no PT needs.  On the day of discharge, the patient's vitals are stable, pain is controlled, voiding urine, passing gas/stool, tolerating a low fiber diet, and ambulating well. Pt will f/u with Dr. Alegre in 1-2 weeks. Pt will f/u with PCP in 1-2 weeks.

## 2025-06-25 NOTE — DISCHARGE NOTE PROVIDER - NSDCCPTREATMENT_GEN_ALL_CORE_FT
PRINCIPAL PROCEDURE  Procedure: Reversal, ileostomy, complex  Findings and Treatment: WOUND CARE: Remove outer dressing prior to shower.  You may shower. Do not scrub incision. Pat Dry abdomen. Cover incisions with dry gauze and paper tape, change daily or as needed.  BATHING: You may shower and/or sponge bathe 24 hours after surgery. Do not submerge the incision underwater for the next 2 weeks.  ACTIVITY: No heavy lifting anything more than 10-15lbs or straining. Otherwise, you may return to your usual level of physical activity. If you are taking narcotic pain medication (such as Oxycodone) do NOT drive a car, operate machinery or make important decisions.  DIET: Maintain Low Fiber Diet until your next appointment.  PAIN: A prescription for oxycodone has been sent to the pharmacy. You should only take these for severe pain. For mild or moderate pain, you may take 975mg of tylenol every 6 hours. Do not exceed more than 4G tylenol per day.   NOTIFY YOUR SURGEON IF: You have any bleeding that does not stop, any pus draining from your wound, any fever (over 100.4 F) or chills, persistent nausea/vomiting with inability to tolerate food or liquids, persistent diarrhea, or if your pain is not controlled on your discharge pain medications.  FOLLOW-UP:  1. Please call to make a follow-up appointment within one to two weeks of discharge with Dr. Alegre.  2. Please follow up with your primary care physician in one week regarding your hospitalization.

## 2025-06-25 NOTE — DISCHARGE NOTE PROVIDER - NSDCFUSCHEDAPPT_GEN_ALL_CORE_FT
[facility]  UROLOGY [address]  Scheduled Appointment: 07/16/2025    Harleen Malcolm  [facility]  UROLOGY [address]  Scheduled Appointment: 07/16/2025    Aquilino Alegre  [facility]  COLOSURG [address] [place] [address]  Scheduled Appointment: 07/28/2025

## 2025-06-25 NOTE — PROGRESS NOTE ADULT - ASSESSMENT
61 yo M with no PMH, diagnosed with adenocarcinoma now s/p ileostomy reversal 6/24.    Plan:  - ERAS protocol  - diet: LRD  - dc morales today- pending TOV 5pm  - pain control  - pending PT recs  - AM labs  - anticipate dc tomorrow    [facility]  [phone] (pager)

## 2025-06-25 NOTE — DISCHARGE NOTE NURSING/CASE MANAGEMENT/SOCIAL WORK - PATIENT PORTAL LINK FT
You can access the FollowMyHealth Patient Portal offered by [facility] by registering at the following website: [email or web address]. By joining [facility]’s FollowMyHealth portal, you will also be able to view your health information using other applications (apps) compatible with our system.

## 2025-06-25 NOTE — DISCHARGE NOTE NURSING/CASE MANAGEMENT/SOCIAL WORK - FINANCIAL ASSISTANCE
[facility] provides services at a reduced cost to those who are determined to be eligible through [facility]’s financial assistance program. Information regarding [facility]’s financial assistance program can be found by going to [email or web address] or by calling [phone].

## 2025-06-25 NOTE — DISCHARGE NOTE PROVIDER - CARE PROVIDER_API CALL
Aquilino Alegre  Colon & Rectal Surgery  [address] [place] [address], [address]  [place] [address]  Phone: [phone]  Fax: [phone]  Follow Up Time: 2 weeks

## 2025-06-27 ENCOUNTER — NON-APPOINTMENT (OUTPATIENT)
Age: 60
End: 2025-06-27

## 2025-06-27 PROBLEM — G89.18 POST-OPERATIVE PAIN: Status: ACTIVE | Noted: 2025-06-27

## 2025-06-27 RX ORDER — OXYCODONE 5 MG/1
5 TABLET ORAL EVERY 6 HOURS
Qty: 5 | Refills: 0 | Status: ACTIVE | COMMUNITY
Start: 2025-06-27 | End: 1900-01-01

## 2025-07-01 LAB — SURGICAL PATHOLOGY STUDY: SIGNIFICANT CHANGE UP

## 2025-07-04 NOTE — DISCHARGE NOTE NURSING/CASE MANAGEMENT/SOCIAL WORK - NSCORESITESY/N_GEN_A_CORE_RD
7/3 with episode of N/V late in the day with sinus tachycardia to the 130s  - Transient AMS  - Had large BM in the AM  - AM dose of metoprolol had not been giving  > Got metoprolo and zofran  > Given a gentle IVF bolus for any dehydration  > Repaet CMP today unremarkable with improved LFTs. Ammonia 61  > CBC with WBC WNL, but hgb down to 9.8 (after getting IVF)    - Recheck in the AM  > Mag WNL  > CTH unremarkable, save for some possible sinusitis  > Monitor for now.    No

## 2025-07-07 NOTE — DISCHARGE NOTE NURSING/CASE MANAGEMENT/SOCIAL WORK - NSTOBACCONEVERSMOKERY/N_GEN_A
All [facility] papers have been signed. No DS or BAT completed at this time. Discharge instructions reviewed with pt. Pt verbalized understanding. Pt ambulated out in stable condition.  No change in pain noted upon discharge.   No

## 2025-07-09 ENCOUNTER — APPOINTMENT (OUTPATIENT)
Dept: COLORECTAL SURGERY | Facility: CLINIC | Age: 60
End: 2025-07-09
Payer: COMMERCIAL

## 2025-07-09 PROCEDURE — 99024 POSTOP FOLLOW-UP VISIT: CPT

## 2025-07-15 NOTE — H&P PST ADULT - PROBLEM SELECTOR PLAN 1
Detail Level: Detailed
Add 1585x Cpt? (Do Not Bill If You Billed For The Procedure Placing The Sutures. This Is An Add-On Code That Must Be Billed With An E/M Visit Code): No
Scheduled for surgery 3/6/2025  Surgical instructions reviewed and provided

## 2025-07-16 ENCOUNTER — APPOINTMENT (OUTPATIENT)
Dept: UROLOGY | Facility: CLINIC | Age: 60
End: 2025-07-16
Payer: COMMERCIAL

## 2025-07-16 ENCOUNTER — OUTPATIENT (OUTPATIENT)
Dept: OUTPATIENT SERVICES | Facility: HOSPITAL | Age: 60
LOS: 1 days | End: 2025-07-16
Payer: COMMERCIAL

## 2025-07-16 VITALS — HEART RATE: 82 BPM | SYSTOLIC BLOOD PRESSURE: 165 MMHG | DIASTOLIC BLOOD PRESSURE: 85 MMHG

## 2025-07-16 DIAGNOSIS — Z93.2 ILEOSTOMY STATUS: Chronic | ICD-10-CM

## 2025-07-16 DIAGNOSIS — Z98.890 OTHER SPECIFIED POSTPROCEDURAL STATES: Chronic | ICD-10-CM

## 2025-07-16 DIAGNOSIS — Z90.49 ACQUIRED ABSENCE OF OTHER SPECIFIED PARTS OF DIGESTIVE TRACT: Chronic | ICD-10-CM

## 2025-07-16 DIAGNOSIS — R35.0 FREQUENCY OF MICTURITION: ICD-10-CM

## 2025-07-16 PROCEDURE — 52000 CYSTOURETHROSCOPY: CPT

## 2025-07-16 PROCEDURE — 99213 OFFICE O/P EST LOW 20 MIN: CPT | Mod: 25

## 2025-07-17 DIAGNOSIS — C67.8 MALIGNANT NEOPLASM OF OVERLAPPING SITES OF BLADDER: ICD-10-CM

## 2025-07-18 LAB
BACTERIA UR CULT: NORMAL
URINE CYTOLOGY: NORMAL

## 2025-07-28 ENCOUNTER — APPOINTMENT (OUTPATIENT)
Dept: COLORECTAL SURGERY | Facility: CLINIC | Age: 60
End: 2025-07-28

## 2025-07-28 PROCEDURE — 45330 DIAGNOSTIC SIGMOIDOSCOPY: CPT | Mod: 58

## 2025-07-29 ENCOUNTER — APPOINTMENT (OUTPATIENT)
Dept: HEMATOLOGY ONCOLOGY | Facility: CLINIC | Age: 60
End: 2025-07-29
Payer: COMMERCIAL

## 2025-07-29 ENCOUNTER — RESULT REVIEW (OUTPATIENT)
Age: 60
End: 2025-07-29

## 2025-07-29 ENCOUNTER — APPOINTMENT (OUTPATIENT)
Dept: INFUSION THERAPY | Facility: HOSPITAL | Age: 60
End: 2025-07-29

## 2025-07-29 VITALS
WEIGHT: 150 LBS | TEMPERATURE: 97.2 F | DIASTOLIC BLOOD PRESSURE: 90 MMHG | SYSTOLIC BLOOD PRESSURE: 151 MMHG | HEART RATE: 80 BPM | HEIGHT: 66.5 IN | BODY MASS INDEX: 23.82 KG/M2 | RESPIRATION RATE: 16 BRPM | OXYGEN SATURATION: 98 %

## 2025-07-29 DIAGNOSIS — C18.9 MALIGNANT NEOPLASM OF COLON, UNSPECIFIED: ICD-10-CM

## 2025-07-29 PROCEDURE — G2211 COMPLEX E/M VISIT ADD ON: CPT | Mod: NC

## 2025-07-29 PROCEDURE — 99215 OFFICE O/P EST HI 40 MIN: CPT

## 2025-08-13 ENCOUNTER — OUTPATIENT (OUTPATIENT)
Dept: OUTPATIENT SERVICES | Facility: HOSPITAL | Age: 60
LOS: 1 days | End: 2025-08-13
Payer: COMMERCIAL

## 2025-08-13 ENCOUNTER — APPOINTMENT (OUTPATIENT)
Dept: UROLOGY | Facility: CLINIC | Age: 60
End: 2025-08-13
Payer: COMMERCIAL

## 2025-08-13 VITALS
DIASTOLIC BLOOD PRESSURE: 85 MMHG | SYSTOLIC BLOOD PRESSURE: 150 MMHG | RESPIRATION RATE: 15 BRPM | HEART RATE: 79 BPM | TEMPERATURE: 98 F | OXYGEN SATURATION: 100 %

## 2025-08-13 DIAGNOSIS — Z98.890 OTHER SPECIFIED POSTPROCEDURAL STATES: Chronic | ICD-10-CM

## 2025-08-13 DIAGNOSIS — Z90.49 ACQUIRED ABSENCE OF OTHER SPECIFIED PARTS OF DIGESTIVE TRACT: Chronic | ICD-10-CM

## 2025-08-13 DIAGNOSIS — Z93.2 ILEOSTOMY STATUS: Chronic | ICD-10-CM

## 2025-08-13 DIAGNOSIS — R35.0 FREQUENCY OF MICTURITION: ICD-10-CM

## 2025-08-13 PROCEDURE — 51720 TREATMENT OF BLADDER LESION: CPT

## 2025-08-13 RX ORDER — BACILLUS CALMETTE-GUERIN 50 MG/50ML
50 POWDER, FOR SUSPENSION INTRAVESICAL
Refills: 0 | Status: COMPLETED | OUTPATIENT
Start: 2025-08-13

## 2025-08-13 RX ADMIN — BACILLUS CALMETTE-GUERIN 0 MG: 50 POWDER, FOR SUSPENSION INTRAVESICAL at 00:00

## 2025-08-14 DIAGNOSIS — C67.8 MALIGNANT NEOPLASM OF OVERLAPPING SITES OF BLADDER: ICD-10-CM

## 2025-08-20 ENCOUNTER — APPOINTMENT (OUTPATIENT)
Dept: UROLOGY | Facility: CLINIC | Age: 60
End: 2025-08-20
Payer: COMMERCIAL

## 2025-08-20 ENCOUNTER — OUTPATIENT (OUTPATIENT)
Dept: OUTPATIENT SERVICES | Facility: HOSPITAL | Age: 60
LOS: 1 days | End: 2025-08-20
Payer: COMMERCIAL

## 2025-08-20 VITALS
RESPIRATION RATE: 16 BRPM | OXYGEN SATURATION: 100 % | DIASTOLIC BLOOD PRESSURE: 88 MMHG | SYSTOLIC BLOOD PRESSURE: 154 MMHG | HEART RATE: 67 BPM

## 2025-08-20 DIAGNOSIS — Z98.890 OTHER SPECIFIED POSTPROCEDURAL STATES: Chronic | ICD-10-CM

## 2025-08-20 DIAGNOSIS — Z93.2 ILEOSTOMY STATUS: Chronic | ICD-10-CM

## 2025-08-20 DIAGNOSIS — R35.0 FREQUENCY OF MICTURITION: ICD-10-CM

## 2025-08-20 DIAGNOSIS — Z90.49 ACQUIRED ABSENCE OF OTHER SPECIFIED PARTS OF DIGESTIVE TRACT: Chronic | ICD-10-CM

## 2025-08-20 PROCEDURE — 51720 TREATMENT OF BLADDER LESION: CPT

## 2025-08-20 RX ORDER — BACILLUS CALMETTE-GUERIN 50 MG/50ML
50 POWDER, FOR SUSPENSION INTRAVESICAL
Refills: 0 | Status: COMPLETED | OUTPATIENT
Start: 2025-08-20

## 2025-08-20 RX ORDER — PHENAZOPYRIDINE HYDROCHLORIDE 100 MG/1
100 TABLET ORAL 3 TIMES DAILY
Qty: 6 | Refills: 0 | Status: ACTIVE | COMMUNITY
Start: 2025-08-20 | End: 1900-01-01

## 2025-08-20 RX ADMIN — BACILLUS CALMETTE-GUERIN 0 MG: 50 POWDER, FOR SUSPENSION INTRAVESICAL at 00:00

## 2025-08-21 DIAGNOSIS — C67.8 MALIGNANT NEOPLASM OF OVERLAPPING SITES OF BLADDER: ICD-10-CM

## 2025-08-27 ENCOUNTER — OUTPATIENT (OUTPATIENT)
Dept: OUTPATIENT SERVICES | Facility: HOSPITAL | Age: 60
LOS: 1 days | End: 2025-08-27
Payer: COMMERCIAL

## 2025-08-27 ENCOUNTER — APPOINTMENT (OUTPATIENT)
Dept: UROLOGY | Facility: CLINIC | Age: 60
End: 2025-08-27
Payer: COMMERCIAL

## 2025-08-27 VITALS
OXYGEN SATURATION: 100 % | HEART RATE: 77 BPM | TEMPERATURE: 98.2 F | DIASTOLIC BLOOD PRESSURE: 77 MMHG | RESPIRATION RATE: 15 BRPM | SYSTOLIC BLOOD PRESSURE: 138 MMHG

## 2025-08-27 DIAGNOSIS — Z98.890 OTHER SPECIFIED POSTPROCEDURAL STATES: Chronic | ICD-10-CM

## 2025-08-27 DIAGNOSIS — C67.8 MALIGNANT NEOPLASM OF OVERLAPPING SITES OF BLADDER: ICD-10-CM

## 2025-08-27 DIAGNOSIS — Z90.49 ACQUIRED ABSENCE OF OTHER SPECIFIED PARTS OF DIGESTIVE TRACT: Chronic | ICD-10-CM

## 2025-08-27 DIAGNOSIS — Z93.2 ILEOSTOMY STATUS: Chronic | ICD-10-CM

## 2025-08-27 DIAGNOSIS — R35.0 FREQUENCY OF MICTURITION: ICD-10-CM

## 2025-08-27 PROCEDURE — 51720 TREATMENT OF BLADDER LESION: CPT

## 2025-08-27 RX ORDER — BACILLUS CALMETTE-GUERIN 50 MG/50ML
50 POWDER, FOR SUSPENSION INTRAVESICAL
Refills: 0 | Status: COMPLETED | OUTPATIENT
Start: 2025-08-27

## 2025-08-27 RX ADMIN — BACILLUS CALMETTE-GUERIN 0 MG: 50 POWDER, FOR SUSPENSION INTRAVESICAL at 00:00

## 2025-08-28 DIAGNOSIS — C67.8 MALIGNANT NEOPLASM OF OVERLAPPING SITES OF BLADDER: ICD-10-CM

## (undated) DEVICE — WARMING BLANKET UPPER ADULT

## (undated) DEVICE — SOL IRR BAG H2O 3000ML

## (undated) DEVICE — FOLEY CATH 2-WAY 18FR 5CC LATEX HYDROGEL

## (undated) DEVICE — DRSG TELFA 3 X 8

## (undated) DEVICE — DRAPE INSTRUMENT POUCH 6.75" X 11"

## (undated) DEVICE — POSITIONER FOAM EGG CRATE ULNAR 2PCS (PINK)

## (undated) DEVICE — FOLEY CATH 3-WAY 22FR 5CC LATEX LUBRICATH

## (undated) DEVICE — ELCTR PLASMA ROLLER 24FR 12-30 DEG

## (undated) DEVICE — SUT POLYSORB 3-0 30" V-20 UNDYED

## (undated) DEVICE — Device

## (undated) DEVICE — FOLEY CATH 2-WAY 28FR 30CC SILICONE

## (undated) DEVICE — DRSG OPSITE 2.5 X 2"

## (undated) DEVICE — SUT CHROMIC 0 36" GS-21

## (undated) DEVICE — TUBING RANGER FLUID IRRIGATION SET DISP

## (undated) DEVICE — TUBING THERMADX UROLOGY

## (undated) DEVICE — VISTASEAL DUAL APPLICATOR

## (undated) DEVICE — SYR ASEPTO

## (undated) DEVICE — DRSG CURITY GAUZE SPONGE 4 X 4" 12-PLY

## (undated) DEVICE — FOLEY TRAY 16FR 5CC LTX UMETER CLOSED

## (undated) DEVICE — PACK CYSTO

## (undated) DEVICE — SYR IRRIGATION PISTON 60CC

## (undated) DEVICE — SOL IRR BAG NS 0.9% 3000ML

## (undated) DEVICE — VENODYNE/SCD SLEEVE CALF LARGE

## (undated) DEVICE — STAPLER SKIN VISI-STAT 35 WIDE

## (undated) DEVICE — SUT POLYSORB 0 36" GU-46

## (undated) DEVICE — ACMI SELF-SEALING SEAL UP TO 7FR

## (undated) DEVICE — DRAPE GENERAL ENDOSCOPY

## (undated) DEVICE — ELCTR GROUNDING PAD ADULT COVIDIEN

## (undated) DEVICE — VENODYNE/SCD SLEEVE CALF MEDIUM

## (undated) DEVICE — GLV 8 PROTEXIS (WHITE)

## (undated) DEVICE — SUT SURGIPRO 0 30" GS-22

## (undated) DEVICE — DRAPE LINGEMAN TUR

## (undated) DEVICE — DRAPE TOWEL BLUE 17" X 24"

## (undated) DEVICE — TUBING STRYKEFLOW II SUCTION / IRRIGATOR

## (undated) DEVICE — SOL IRR POUR H2O 1500ML

## (undated) DEVICE — GLV 8.5 PROTEXIS (WHITE)

## (undated) DEVICE — OSTOMY KIT 2-PIECE 2.25" NS (RED)

## (undated) DEVICE — PREP BETADINE KIT

## (undated) DEVICE — D HELP - CLEARVIEW CLEARIFY SYSTEM

## (undated) DEVICE — WARMING BLANKET FULL ADULT

## (undated) DEVICE — SOL IRR POUR H2O 250ML

## (undated) DEVICE — TUBING INSUFFLATION LAP FILTER 10FT

## (undated) DEVICE — PREP CHLORAPREP HI-LITE ORANGE 26ML

## (undated) DEVICE — SUT CHROMIC 1 30" GS-21

## (undated) DEVICE — VALVE YELLOW PORT SEAL PLUS 5MM

## (undated) DEVICE — ELCTR PLASMA BUTTON OVAL 24FR 12-30 DEG

## (undated) DEVICE — GLV 7.5 PROTEXIS (WHITE)

## (undated) DEVICE — POSITIONER FOAM HEADREST (PINK)

## (undated) DEVICE — ELCTR BOVIE PENCIL SMOKE EVACUATION

## (undated) DEVICE — PACK COLON BUNDLE

## (undated) DEVICE — LIGASURE BLUNT TIP 37CM

## (undated) DEVICE — DRAPE MAYO STAND 30"

## (undated) DEVICE — SUT SOFSILK 3-0 18" V-20 (POP-OFF)

## (undated) DEVICE — ADAPTER URETHRAL CATH CONNECTING

## (undated) DEVICE — KIT SPINAL DRUGS NDL 25G X 3.5IN

## (undated) DEVICE — GLV 7 PROTEXIS (WHITE)

## (undated) DEVICE — BAG URINE W METER 2L

## (undated) DEVICE — SYR LUER LOK 30CC

## (undated) DEVICE — ELCTR PLASMA LOOP MEDIUM ANGLED 24FR 12-30 DEG

## (undated) DEVICE — BLADE SCALPEL SAFETYLOCK #10

## (undated) DEVICE — GOWN TRIMAX LG

## (undated) DEVICE — BOSTON SCIENTIFC ENCORE 26 INFLATOR

## (undated) DEVICE — MARKING PEN W RULER

## (undated) DEVICE — SUT MAXON 1 36" GS-24

## (undated) DEVICE — MEDICATION LABELS W MARKER

## (undated) DEVICE — BLADE SCALPEL SAFETYLOCK #15

## (undated) DEVICE — DRSG TEGADERM 6 X 8"

## (undated) DEVICE — GLV 6.5 PROTEXIS (WHITE)

## (undated) DEVICE — SUT PDS II 1 54" TP-1

## (undated) DEVICE — PACK MAJOR ABDOMINAL SUPINE

## (undated) DEVICE — SOL IRR POUR NS 0.9% 500ML

## (undated) DEVICE — TROCAR APPLIED MEDICAL KII BALLOON BLUNT TIP 12MM X 100MM

## (undated) DEVICE — DRAPE LEGGINGS XL

## (undated) DEVICE — SUT SOFSILK 2-0 18" V-20 (POP-OFF)

## (undated) DEVICE — GELPORT LAPAROSCOPIC SYSTEM

## (undated) DEVICE — DRSG OPSITE 13.75 X 4"

## (undated) DEVICE — SUT CHROMIC 3-0 30" V-20

## (undated) DEVICE — SPECIMEN CONTAINER 100ML

## (undated) DEVICE — CATH NG SALEM SUMP 16FR

## (undated) DEVICE — LIGASURE IMPACT

## (undated) DEVICE — DRAPE 1/2 SHEET 40X57"

## (undated) DEVICE — TROCAR COVIDIEN VERSAPORT BLADELESS OPTICAL 5MM STANDARD

## (undated) DEVICE — DRSG TAPE UMBILICAL COTTON 2" X 30 X 1/8"

## (undated) DEVICE — URETERAL CATH RED RUBBER 18FR (RED)

## (undated) DEVICE — ADAPTER CHECK FLO 9FR STERILE

## (undated) DEVICE — ELCTR PLASMA BUTTON 24FR 12-30 DEG

## (undated) DEVICE — PACK BASIN SPECIAL PROCEDURE

## (undated) DEVICE — IRR BULB PATHFINDER + 10"

## (undated) DEVICE — FOLEY CATH 2-WAY 20F 5CC LATEX LUBRICATH

## (undated) DEVICE — TAPE SILK 3"